# Patient Record
Sex: FEMALE | Race: WHITE | NOT HISPANIC OR LATINO | Employment: OTHER | URBAN - METROPOLITAN AREA
[De-identification: names, ages, dates, MRNs, and addresses within clinical notes are randomized per-mention and may not be internally consistent; named-entity substitution may affect disease eponyms.]

---

## 2017-01-09 DIAGNOSIS — E53.8 VITAMIN B12 DEFICIENCY (NON ANEMIC): Primary | ICD-10-CM

## 2017-01-09 NOTE — TELEPHONE ENCOUNTER
BD Luer-michael syringes     Last Written Prescription Date: 01/18/16  Last Fill Quantity: 12,  # refills: 1   Last Office Visit with [facility], [facility] or [facility] prescribing provider: 06/13/16

## 2017-01-16 DIAGNOSIS — I10 ESSENTIAL HYPERTENSION: Primary | ICD-10-CM

## 2017-01-16 NOTE — TELEPHONE ENCOUNTER
Labetalol    Last Written Prescription Date: 09/27/16  Last Fill Quantity: 180, # refills: 3    Last Office Visit with [facility], P or [facility] prescribing provider:  06/13/16   Future Office Visit:        BP Readings from Last 3 Encounters:   06/13/16 123/78   11/18/15 136/86   12/10/14 123/82

## 2017-01-17 NOTE — TELEPHONE ENCOUNTER
Pt is out of medication and wants filled today   Please advise     Lillie Kam  [facility] [place]

## 2017-01-18 RX ORDER — LABETALOL 100 MG/1
300 TABLET, FILM COATED ORAL 2 TIMES DAILY
Qty: 180 TABLET | Refills: 4 | Status: SHIPPED | OUTPATIENT
Start: 2017-01-18 | End: 2017-06-06

## 2017-01-18 NOTE — TELEPHONE ENCOUNTER
Prescription approved per [facility] Refill Protocol.    Mayra WILLIS RN

## 2017-06-06 DIAGNOSIS — I10 ESSENTIAL HYPERTENSION: ICD-10-CM

## 2017-06-06 NOTE — TELEPHONE ENCOUNTER
Labetalol      Last Written Prescription Date: 01/18/2017  Last Fill Quantity: 180, # refills: 4  Last [facility] with [facility], [facility] or [facility] prescribing provider: 06/13/2016       Potassium   Date Value Ref Range Status   11/18/2015 3.5 3.4 - 5.3 mmol/L Final     Creatinine   Date Value Ref Range Status   11/18/2015 0.82 0.52 - 1.04 mg/dL Final     BP Readings from Last 3 Encounters:   06/13/16 123/78   11/18/15 136/86   12/10/14 123/82       Garrick EDDY (R)

## 2017-06-08 RX ORDER — LABETALOL 100 MG/1
300 TABLET, FILM COATED ORAL 2 TIMES DAILY
Qty: 180 TABLET | Refills: 0 | Status: SHIPPED | OUTPATIENT
Start: 2017-06-08 | End: 2017-06-26

## 2017-06-21 ENCOUNTER — TELEPHONE (OUTPATIENT)
Dept: FAMILY MEDICINE | Facility: CLINIC | Age: 62
End: 2017-06-21

## 2017-06-21 DIAGNOSIS — I10 ESSENTIAL HYPERTENSION WITH GOAL BLOOD PRESSURE LESS THAN 140/90: ICD-10-CM

## 2017-06-21 DIAGNOSIS — E53.8 VITAMIN B12 DEFICIENCY (NON ANEMIC): ICD-10-CM

## 2017-06-21 NOTE — TELEPHONE ENCOUNTER
HCTZ     Last Written Prescription Date: 06/13/16  Last Fill Quantity: 90, # refills: 3  Last Office Visit with [facility], [facility] or  Health prescribing provider: 06/13/16  Next 5 appointments (look out 90 days)     Jun 26, 2017  5:00 PM CDT   Office Visit with YARA Allen CNP   [facility]    [address] [address]   [phone]                   Potassium   Date Value Ref Range Status   11/18/2015 3.5 3.4 - 5.3 mmol/L Final     Creatinine   Date Value Ref Range Status   11/18/2015 0.82 0.52 - 1.04 mg/dL Final     BP Readings from Last 3 Encounters:   06/13/16 123/78   11/18/15 136/86   12/10/14 123/82     Vit B12 injection      Last Written Prescription Date: 06/13/16  Last Fill Quantity: 90,  # refills: 3   Last Office Visit with [facility], [facility] or  Health prescribing provider: 06/13/16                                         Next 5 appointments (look out 90 days)     Jun 26, 2017  5:00 PM CDT   Office Visit with YARA Allen CNP   [facility]    [address] [address]   [phone]

## 2017-06-26 ENCOUNTER — OFFICE VISIT (OUTPATIENT)
Dept: FAMILY MEDICINE | Facility: CLINIC | Age: 62
End: 2017-06-26
Payer: COMMERCIAL

## 2017-06-26 VITALS
HEART RATE: 56 BPM | HEIGHT: 64 IN | SYSTOLIC BLOOD PRESSURE: 130 MMHG | TEMPERATURE: 97.9 F | DIASTOLIC BLOOD PRESSURE: 79 MMHG

## 2017-06-26 DIAGNOSIS — I10 ESSENTIAL HYPERTENSION WITH GOAL BLOOD PRESSURE LESS THAN 140/90: ICD-10-CM

## 2017-06-26 DIAGNOSIS — E53.8 VITAMIN B12 DEFICIENCY (NON ANEMIC): ICD-10-CM

## 2017-06-26 LAB
ANION GAP SERPL CALCULATED.3IONS-SCNC: 6 MMOL/L (ref 3–14)
BUN SERPL-MCNC: 10 MG/DL (ref 7–30)
CALCIUM SERPL-MCNC: 9.8 MG/DL (ref 8.5–10.1)
CHLORIDE SERPL-SCNC: 105 MMOL/L (ref 94–109)
CO2 SERPL-SCNC: 30 MMOL/L (ref 20–32)
CREAT SERPL-MCNC: 0.88 MG/DL (ref 0.52–1.04)
ERYTHROCYTE [DISTWIDTH] IN BLOOD BY AUTOMATED COUNT: 12.9 % (ref 10–15)
GFR SERPL CREATININE-BSD FRML MDRD: 66 ML/MIN/1.7M2
GLUCOSE SERPL-MCNC: 81 MG/DL (ref 70–99)
HCT VFR BLD AUTO: 43.2 % (ref 35–47)
HGB BLD-MCNC: 14.5 G/DL (ref 11.7–15.7)
MCH RBC QN AUTO: 30.5 PG (ref 26.5–33)
MCHC RBC AUTO-ENTMCNC: 33.6 G/DL (ref 31.5–36.5)
MCV RBC AUTO: 91 FL (ref 78–100)
PLATELET # BLD AUTO: 245 10E9/L (ref 150–450)
POTASSIUM SERPL-SCNC: 3.5 MMOL/L (ref 3.4–5.3)
RBC # BLD AUTO: 4.75 10E12/L (ref 3.8–5.2)
SODIUM SERPL-SCNC: 141 MMOL/L (ref 133–144)
WBC # BLD AUTO: 7.5 10E9/L (ref 4–11)

## 2017-06-26 PROCEDURE — 80048 BASIC METABOLIC PNL TOTAL CA: CPT | Performed by: NURSE PRACTITIONER

## 2017-06-26 PROCEDURE — 82607 VITAMIN B-12: CPT | Performed by: NURSE PRACTITIONER

## 2017-06-26 PROCEDURE — 36415 COLL VENOUS BLD VENIPUNCTURE: CPT | Performed by: NURSE PRACTITIONER

## 2017-06-26 PROCEDURE — 85027 COMPLETE CBC AUTOMATED: CPT | Performed by: NURSE PRACTITIONER

## 2017-06-26 PROCEDURE — 99214 OFFICE O/P EST MOD 30 MIN: CPT | Performed by: NURSE PRACTITIONER

## 2017-06-26 RX ORDER — VALSARTAN 160 MG/1
160 TABLET ORAL DAILY
Qty: 90 TABLET | Refills: 3 | Status: SHIPPED | OUTPATIENT
Start: 2017-06-26 | End: 2018-06-09

## 2017-06-26 RX ORDER — CYANOCOBALAMIN 1000 UG/ML
1 INJECTION, SOLUTION INTRAMUSCULAR; SUBCUTANEOUS
Qty: 3 ML | Refills: 9 | Status: SHIPPED | OUTPATIENT
Start: 2017-06-26 | End: 2018-07-17

## 2017-06-26 RX ORDER — HYDROCHLOROTHIAZIDE 12.5 MG/1
CAPSULE ORAL
Qty: 30 CAPSULE | Refills: 2 | OUTPATIENT
Start: 2017-06-26

## 2017-06-26 RX ORDER — LABETALOL 100 MG/1
300 TABLET, FILM COATED ORAL 2 TIMES DAILY
Qty: 540 TABLET | Refills: 3 | Status: SHIPPED | OUTPATIENT
Start: 2017-06-26 | End: 2018-07-05

## 2017-06-26 RX ORDER — HYDROCHLOROTHIAZIDE 12.5 MG/1
12.5 CAPSULE ORAL EVERY MORNING
Qty: 90 CAPSULE | Refills: 3 | Status: SHIPPED | OUTPATIENT
Start: 2017-06-26 | End: 2018-06-20

## 2017-06-26 RX ORDER — CYANOCOBALAMIN 1000 UG/ML
INJECTION, SOLUTION INTRAMUSCULAR; SUBCUTANEOUS
Qty: 1 ML | Refills: 8 | OUTPATIENT
Start: 2017-06-26

## 2017-06-26 NOTE — PROGRESS NOTES
"  SUBJECTIVE:                                                    Aretha Hooks is a 61 year old female who presents to clinic today for the following health issues:      Hypertension Follow-up      Outpatient blood pressures are being checked when she gives blood, every 6 weeks.  Results are good.    Low Salt Diet: no added salt      Amount of exercise or physical activity: little bit    Problems taking medications regularly: No    Medication side effects: none    Diet: regular (no restrictions)      Vitamin B12 deficiency:  Chronic issue.  Does injections once per month at home.      Problem list and histories reviewed & adjusted, as indicated.  Additional history: as documented      Reviewed and updated as needed this visit by clinical staff  Tobacco  Allergies  Med Hx  Surg Hx  Fam Hx  Soc Hx      Reviewed and updated as needed this visit by Provider         ROS:  Constitutional, HEENT, cardiovascular, pulmonary, gi and gu systems are negative, except as otherwise noted.    OBJECTIVE:     /79 (BP Location: Right arm)  Pulse 56  Temp 97.9  F (36.6  C) (Oral)  Ht 5' 4.25\" (1.632 m)  There is no height or weight on file to calculate BMI.  GENERAL: healthy, alert and no distress  HENT: ear canals and TM's normal, nose and mouth without ulcers or lesions  NECK: no adenopathy, no asymmetry, masses, or scars and thyroid normal to palpation  RESP: lungs clear to auscultation - no rales, rhonchi or wheezes  CV: regular rate and rhythm, normal S1 S2, no S3 or S4, no murmur, click or rub, no peripheral edema and peripheral pulses strong        ASSESSMENT/PLAN:       ICD-10-CM    1. Essential hypertension with goal blood pressure less than 140/90 I10 hydrochlorothiazide (MICROZIDE) 12.5 MG capsule     valsartan (DIOVAN) 160 MG tablet     labetalol (NORMODYNE) 100 MG tablet     Basic metabolic panel   2. Vitamin B12 deficiency (non anemic) E53.8 cyanocobalamin (VITAMIN B12) 1000 MCG/ML injection     Syringe, " Disposable, (SYRINGE/CANNULA) 3 ML MISC     CBC with platelets     Vitamin B12         The risks, benefits and treatment options of prescribed medications or other treatments have been discussed with the patient. The patient verbalized their understanding and should call or follow up if no improvement or if they develop further problems.    YARA [facility]

## 2017-06-26 NOTE — LETTER
[facility]  [address] [address]  Phone: [phone]    June 27, 2017    Aretha Jeannine Hooks  [address]  [place] [address]          Dear MsCecille Hooks,    The results of your recent lab tests were:    Blood counts are normal.   Vitamin B12 levels are normal.   Kidney function and electrolytes are normal.       Enclosed is a copy of these results.  If you have any further questions or problems, please contact our office.        Sincerely,      SELAM Rojas / KERI

## 2017-06-26 NOTE — MR AVS SNAPSHOT
After Visit Summary   6/26/2017    Aretha Hooks    MRN: [id number]           Patient Information     Date Of Birth          1955        Visit Information        Provider Department      6/26/2017 5:00 PM Autumn Cedeno APRN CNP [facility]        Today's Diagnoses     Essential hypertension with goal blood pressure less than 140/90        Vitamin B12 deficiency (non anemic)           Follow-ups after your visit        Your next 10 appointments already scheduled     Jul 11, 2017  4:15 PM CDT   MA SCREENING DIGITAL BILATERAL with [id number]   [facility] ([facility])    [address] [address]   [phone]           Do not use any powder, lotion or deodorant under your arms or on your breast. If you do, we will ask you to remove it before your exam.  Wear comfortable, two-piece clothing.  If you have any allergies, tell your care team.  Bring any previous mammograms from other facilities or have them mailed to the breast center.              Who to contact     If you have questions or need follow up information about today's clinic visit or your schedule please contact [facility] directly at [phone].  Normal or non-critical lab and imaging results will be communicated to you by MyChart, letter or phone within 4 business days after the clinic has received the results. If you do not hear from us within 7 days, please contact the clinic through MyChart or phone. If you have a critical or abnormal lab result, we will notify you by phone as soon as possible.  Submit refill requests through [employer] or call your pharmacy and they will forward the refill request to us. Please allow 3 business days for your refill to be completed.          Additional Information About Your Visit        MyChart Information     [employer] gives you secure access to your electronic health record. If you see a primary care provider,  "you can also send messages to your care team and make appointments. If you have questions, please call your primary care clinic.  If you do not have a primary care provider, please call [phone] and they will assist you.        Care EveryWhere ID     This is your Care EveryWhere ID. This could be used by other organizations to access your [place] medical records  [id number]        Your Vitals Were     Pulse Temperature Height             56 97.9  F (36.6  C) (Oral) 5' 4.25\" (1.632 m)          Blood Pressure from Last 3 Encounters:   06/26/17 130/79   06/13/16 123/78   11/18/15 136/86    Weight from Last 3 Encounters:   04/01/13 178 lb (80.7 kg)   03/27/12 178 lb (80.7 kg)   03/06/12 178 lb (80.7 kg)              We Performed the Following     Basic metabolic panel     CBC with platelets     Vitamin B12          Today's Medication Changes          These changes are accurate as of: 6/26/17  5:16 PM.  If you have any questions, ask your nurse or doctor.               These medicines have changed or have updated prescriptions.        Dose/Directions    labetalol 100 MG tablet   Commonly known as:  NORMODYNE   This may have changed:  additional instructions   Used for:  Essential hypertension with goal blood pressure less than 140/90   Changed by:  Autumn Cedeno APRN CNP        Dose:  300 mg   Take 3 tablets (300 mg) by mouth 2 times daily   Quantity:  540 tablet   Refills:  3            Where to get your medicines      These medications were sent to [id number] - [place], [place] - [address]  [address] [address]     Phone:  [phone]     cyanocobalamin 1000 MCG/ML injection    hydrochlorothiazide 12.5 MG capsule    labetalol 100 MG tablet    Syringe/Cannula 3 ML Misc    valsartan 160 MG tablet                Primary Care Provider Office Phone # Fax #    Judy Jiménez [phone]       [facility] [address] [facility] [address]        Equal Access to Services     CAIN LOPEZ : Hadii aad ku hadcoreyjose Adams, waaxda luezra, qaybta darrellmaosman leigh, froilan hung. So [facility] [phone].    ATENCIÓN: Si habla español, tiene a kate disposición servicios gratuitos de asistencia lingüística. Llame al [phone].    We comply with applicable federal civil rights laws and [place] laws. We do not discriminate on the basis of race, color, national origin, age, disability sex, sexual orientation or gender identity.            Thank you!     Thank you for choosing [facility]  for your care. Our goal is always to provide you with excellent care. Hearing back from our patients is one way we can continue to improve our services. Please take a few minutes to complete the written survey that you may receive in the mail after your visit with us. Thank you!             Your Updated Medication List - Protect others around you: Learn how to safely use, store and throw away your medicines at www.disposemymeds.org.          This list is accurate as of: 6/26/17  5:16 PM.  Always use your most recent med list.                   Brand Name Dispense Instructions for use Diagnosis    CALTRATE 600 + D 600-200 MG-IU Tabs      2 TABLET DAILY        cyanocobalamin 1000 MCG/ML injection    VITAMIN B12    3 mL    Inject 1 mL (1,000 mcg) into the muscle every 30 days    Vitamin B12 deficiency (non anemic)       Fish Oil Oil      1 capsule twice daily - unknown dose        hydrochlorothiazide 12.5 MG capsule    MICROZIDE    90 capsule    Take 1 capsule (12.5 mg) by mouth every morning    Essential hypertension with goal blood pressure less than 140/90       labetalol 100 MG tablet    NORMODYNE    540 tablet    Take 3 tablets (300 mg) by mouth 2 times daily    Essential hypertension with goal blood pressure less than 140/90       MULTIVITAMIN TABS   OR      1 TABLET DAILY        NEEDLES, ANY SIZE      1 Box    Use once a month for B12 injection    Vitamin B12 deficiency (non anemic)       Syringe/Cannula 3 ML Misc     12 each    3 ml syringe    Vitamin B12 deficiency (non anemic)       valsartan 160 MG tablet    DIOVAN    90 tablet    Take 1 tablet (160 mg) by mouth daily    Essential hypertension with goal blood pressure less than 140/90

## 2017-06-26 NOTE — TELEPHONE ENCOUNTER
Vit B12 Injection     Last Written Prescription Date: 6/13/16  Last Fill Quantity: 3    # refills: 9  Last Office Visit with [facility] or  Health prescribing provider:  6/13/16  Next 5 appointments (look out 90 days)     Jun 26, 2017  5:00 PM CDT   [facility] Visit with YARA Allen CNP   [facility]    [address] [address]   [phone]                   Lab Results   Component Value Date    WBC 7.6 03/06/2009     Lab Results   Component Value Date    RBC 5.26 03/06/2009     Lab Results   Component Value Date    HGB 13.9 08/16/2011     Lab Results   Component Value Date    HCT 46.5 03/06/2009     No components found for: MCT  Lab Results   Component Value Date    MCV 88 03/06/2009     Lab Results   Component Value Date    MCH 30.0 03/06/2009     Lab Results   Component Value Date    MCHC 34.0 03/06/2009     Lab Results   Component Value Date    RDW 12.6 03/06/2009     Lab Results   Component Value Date     03/06/2009     Lab Results   Component Value Date    AST 20 01/26/2004     Lab Results   Component Value Date    ALT 23 01/26/2004     Creatinine   Date Value Ref Range Status   11/18/2015 0.82 0.52 - 1.04 mg/dL Final       HCTZ    Last Written Prescription Date: 6/13/16  Last Fill Quantity: 90, # refills: 3  Last Office Visit with [facility], [facility] or  Health prescribing provider: 6/13/17  Next 5 appointments (look out 90 days)     Jun 26, 2017  5:00 PM CDT   Office Visit with YARA Allen CNP   [facility]    [address] [place] [address]   [phone]                   Potassium   Date Value Ref Range Status   11/18/2015 3.5 3.4 - 5.3 mmol/L Final     Creatinine   Date Value Ref Range Status   11/18/2015 0.82 0.52 - 1.04 mg/dL Final     BP Readings from Last 3 Encounters:   06/13/16 123/78   11/18/15 136/86   12/10/14 123/82       Routing refill  request to provider for review/approval because:  Labs not current:    Patient needs to be seen because it has been more than 1 year since last office visit.    Taya BLACK Rn

## 2017-06-27 LAB — VIT B12 SERPL-MCNC: 470 PG/ML (ref 193–986)

## 2017-07-08 ENCOUNTER — HEALTH MAINTENANCE LETTER (OUTPATIENT)
Age: 62
End: 2017-07-08

## 2017-07-11 ENCOUNTER — HOSPITAL ENCOUNTER (OUTPATIENT)
Dept: MAMMOGRAPHY | Facility: CLINIC | Age: 62
Discharge: HOME | End: 2017-07-11
Attending: NURSE PRACTITIONER | Admitting: NURSE PRACTITIONER
Payer: COMMERCIAL

## 2017-07-11 DIAGNOSIS — Z12.31 VISIT FOR SCREENING MAMMOGRAM: ICD-10-CM

## 2017-07-11 PROCEDURE — 77063 BREAST TOMOSYNTHESIS BI: CPT

## 2017-10-04 ENCOUNTER — TELEPHONE (OUTPATIENT)
Dept: FAMILY MEDICINE | Facility: CLINIC | Age: 62
End: 2017-10-04

## 2017-10-04 DIAGNOSIS — Z12.11 SPECIAL SCREENING FOR MALIGNANT NEOPLASMS, COLON: Primary | ICD-10-CM

## 2017-10-04 NOTE — TELEPHONE ENCOUNTER
Patient is calling stating for insurance purposes, she needs an order for a Colonoscopy. Please call when that order is in. [phone].  [facility]  [facility] [place]

## 2017-10-04 NOTE — TELEPHONE ENCOUNTER
Judy,    Patient asking for colonoscopy order.  Looks like this abstracted document states one at age 62 which will be end of yr.  FYI this is abstracted as colonoscopy but states normal flex sig.  I have pended the colonoscopy for your approval. Yumi MAURO RN      Order   COLONOSCOPY [EXAMCOL] (Order [id number])   Impression   [facility]  Date of service: 12/4/13  Indication: carcinoid tumor, by history  Comments: Normal flex sig with prominent tattoos and scar in the  mid rectum.   Plan: Cardinoid tumor of rectum, discontinue F/U except for  routine colonoscopy at age 62. Risk is so low it is probably not  worth following at this point.

## 2017-10-05 NOTE — TELEPHONE ENCOUNTER
Pt calling to see if this had been done. She would like us to contact her on her cell phone. That number is [phone].    [facility]

## 2017-10-05 NOTE — TELEPHONE ENCOUNTER
Unable to leave message as mail box is not set up yet.  I have sent her a my chart message about the order for colonoscopy being complete. Yumi MAURO RN  I have mailed prep to her. Yumi MAURO RN

## 2017-11-08 ENCOUNTER — ANESTHESIA EVENT (OUTPATIENT)
Dept: GASTROENTEROLOGY | Facility: CLINIC | Age: 62
End: 2017-11-08
Payer: COMMERCIAL

## 2017-11-08 NOTE — ANESTHESIA PREPROCEDURE EVALUATION
Anesthesia Evaluation     . Pt has had prior anesthetic.            ROS/MED HX    ENT/Pulmonary:     (+)tobacco use, , . .    Neurologic:       Cardiovascular:     (+) hypertension----. : . . . :. .       METS/Exercise Tolerance:     Hematologic:         Musculoskeletal:         GI/Hepatic:     (+) Other GI/Hepatic Carcinoid tumor of colon      Renal/Genitourinary:         Endo:         Psychiatric:         Infectious Disease:         Malignancy:         Other:                     Physical Exam  Normal systems: cardiovascular, pulmonary and dental    Airway   Mallampati: II  TM distance: >3 FB  Neck ROM: full    Dental     Cardiovascular       Pulmonary                     Anesthesia Plan      History & Physical Review  History and physical reviewed and following examination; no interval change.    ASA Status:  2 .    NPO Status:  > 6 hours    Plan for MAC Reason for MAC:  Deep or markedly invasive procedure (G8)         Postoperative Care      Consents  Anesthetic plan, risks, benefits and alternatives discussed with:  Patient..                          .

## 2017-11-10 ENCOUNTER — SURGERY (OUTPATIENT)
Age: 62
End: 2017-11-10

## 2017-11-10 ENCOUNTER — HOSPITAL ENCOUNTER (OUTPATIENT)
Facility: CLINIC | Age: 62
Discharge: HOME | End: 2017-11-10
Attending: SURGERY | Admitting: SURGERY
Payer: COMMERCIAL

## 2017-11-10 ENCOUNTER — ANESTHESIA (OUTPATIENT)
Dept: GASTROENTEROLOGY | Facility: CLINIC | Age: 62
End: 2017-11-10
Payer: COMMERCIAL

## 2017-11-10 VITALS
WEIGHT: 175 LBS | RESPIRATION RATE: 16 BRPM | HEART RATE: 71 BPM | HEIGHT: 66 IN | DIASTOLIC BLOOD PRESSURE: 67 MMHG | OXYGEN SATURATION: 96 % | SYSTOLIC BLOOD PRESSURE: 111 MMHG | BODY MASS INDEX: 28.12 KG/M2 | TEMPERATURE: 98.3 F

## 2017-11-10 LAB — COLONOSCOPY: NORMAL

## 2017-11-10 PROCEDURE — 25000125 ZZHC RX 250: Performed by: SURGERY

## 2017-11-10 PROCEDURE — 45380 COLONOSCOPY AND BIOPSY: CPT | Performed by: SURGERY

## 2017-11-10 PROCEDURE — 88305 TISSUE EXAM BY PATHOLOGIST: CPT | Performed by: SURGERY

## 2017-11-10 PROCEDURE — 88305 TISSUE EXAM BY PATHOLOGIST: CPT | Mod: 26 | Performed by: SURGERY

## 2017-11-10 PROCEDURE — 37000008 ZZH ANESTHESIA TECHNICAL FEE, 1ST 30 MIN: Performed by: SURGERY

## 2017-11-10 PROCEDURE — 25000125 ZZHC RX 250: Performed by: NURSE ANESTHETIST, CERTIFIED REGISTERED

## 2017-11-10 PROCEDURE — 45380 COLONOSCOPY AND BIOPSY: CPT | Mod: PT | Performed by: SURGERY

## 2017-11-10 PROCEDURE — 25000128 H RX IP 250 OP 636: Performed by: SURGERY

## 2017-11-10 PROCEDURE — 25000128 H RX IP 250 OP 636: Performed by: NURSE ANESTHETIST, CERTIFIED REGISTERED

## 2017-11-10 RX ORDER — GLYCOPYRROLATE 0.2 MG/ML
INJECTION, SOLUTION INTRAMUSCULAR; INTRAVENOUS PRN
Status: DISCONTINUED | OUTPATIENT
Start: 2017-11-10 | End: 2017-11-10

## 2017-11-10 RX ORDER — PROPOFOL 10 MG/ML
INJECTION, EMULSION INTRAVENOUS CONTINUOUS PRN
Status: DISCONTINUED | OUTPATIENT
Start: 2017-11-10 | End: 2017-11-10

## 2017-11-10 RX ORDER — ONDANSETRON 2 MG/ML
4 INJECTION INTRAMUSCULAR; INTRAVENOUS
Status: DISCONTINUED | OUTPATIENT
Start: 2017-11-10 | End: 2017-11-10 | Stop reason: HOSPADM

## 2017-11-10 RX ORDER — PROPOFOL 10 MG/ML
INJECTION, EMULSION INTRAVENOUS PRN
Status: DISCONTINUED | OUTPATIENT
Start: 2017-11-10 | End: 2017-11-10

## 2017-11-10 RX ORDER — LIDOCAINE HYDROCHLORIDE 10 MG/ML
INJECTION, SOLUTION INFILTRATION; PERINEURAL PRN
Status: DISCONTINUED | OUTPATIENT
Start: 2017-11-10 | End: 2017-11-10

## 2017-11-10 RX ORDER — LIDOCAINE 40 MG/G
CREAM TOPICAL
Status: DISCONTINUED | OUTPATIENT
Start: 2017-11-10 | End: 2017-11-10 | Stop reason: HOSPADM

## 2017-11-10 RX ORDER — SODIUM CHLORIDE, SODIUM LACTATE, POTASSIUM CHLORIDE, CALCIUM CHLORIDE 600; 310; 30; 20 MG/100ML; MG/100ML; MG/100ML; MG/100ML
INJECTION, SOLUTION INTRAVENOUS CONTINUOUS
Status: DISCONTINUED | OUTPATIENT
Start: 2017-11-10 | End: 2017-11-10 | Stop reason: HOSPADM

## 2017-11-10 RX ADMIN — LIDOCAINE HYDROCHLORIDE 1 ML: 10 INJECTION, SOLUTION EPIDURAL; INFILTRATION; INTRACAUDAL; PERINEURAL at 08:50

## 2017-11-10 RX ADMIN — PROPOFOL 200 MCG/KG/MIN: 10 INJECTION, EMULSION INTRAVENOUS at 09:18

## 2017-11-10 RX ADMIN — GLYCOPYRROLATE 0.2 MG: 0.2 INJECTION, SOLUTION INTRAMUSCULAR; INTRAVENOUS at 09:17

## 2017-11-10 RX ADMIN — SODIUM CHLORIDE, POTASSIUM CHLORIDE, SODIUM LACTATE AND CALCIUM CHLORIDE: 600; 310; 30; 20 INJECTION, SOLUTION INTRAVENOUS at 08:50

## 2017-11-10 RX ADMIN — PROPOFOL 100 MG: 10 INJECTION, EMULSION INTRAVENOUS at 09:18

## 2017-11-10 RX ADMIN — LIDOCAINE HYDROCHLORIDE 50 MG: 10 INJECTION, SOLUTION INFILTRATION; PERINEURAL at 09:17

## 2017-11-10 ASSESSMENT — LIFESTYLE VARIABLES: TOBACCO_USE: 1

## 2017-11-10 NOTE — H&P
[facility]  GI Pre-Procedure History & Physical      Name: Aretha Hooks MRN#: [id number]   Age: 61 year old YOB: 1955     Date of Procedure: 11/10/2017    Primary care provider: Judy Jiménez      Reason for Procedure:   Screening (V76.51), previous history of a carcinoid tumor removed from the colon    Problem List:     Patient Active Problem List   Diagnosis     Essential hypertension     Other specified disorder of rectum and anus     Tear meniscus knee     Hypertriglyceridemia     Vitamin B12 deficiency (non anemic)     CARDIOVASCULAR SCREENING; LDL GOAL LESS THAN 130       Current Outpatient Medications:      No current outpatient prescriptions on file.        Allergies:    No Known Allergies     History:   Medical:  Past Medical History:   Diagnosis Date     ASCUS of cervix with negative high risk HPV 3/2011    per [facility] repeat Pap + HPV cotesting in 3 years     Carcinoid tumor  of colon      Other vitamin B12 deficiency anemia     injection of B12 monthly     Unspecified essential hypertension      Surgical:  Past Surgical History:   Procedure Laterality Date     C APPENDECTOMY  Age 12    Appendectomy     C LIGATE FALLOPIAN TUBE  1984    Tubal Ligation     HC KNEE SCOPE,MED+LAT MENIS REPAIR      left knee     SURGICAL HISTORY OF -       Cervical dilatation with curettage and hysteroscopy with rollerball endometrial ablation     SURGICAL HISTORY OF -   2004    Laparoscopic Cholecystectomy     SURGICAL HISTORY OF -       Cryocautery to cervix for bleeding     SURGICAL HISTORY OF -       endometrial ablation     SURGICAL HISTORY OF -   2008    carcinoid tumor rectum  Ruthie Lawson     Family:  Family History   Problem Relation Age of Onset     CANCER Mother      renal,      Hypertension Father      Respiratory Father      uses cpap     Eye Disorder Father      cataracts     Hypertension Brother      Musculoskeletal Disorder Sister   "    back surg.     Genitourinary Problems Sister      CANCER Sister      skin cancer     Gynecology Sister      hysterectomy     C.A.D. Paternal Grandmother      Breast Cancer No family hx of      Cancer - colorectal No family hx of      Colon Cancer No family hx of      Social:  Social History   Substance Use Topics     Smoking status: Passive Smoke Exposure - Never Smoker     Smokeless tobacco: Never Used      Comment: exposed to 's cigarette smoke     Alcohol use Yes      Comment: occ       Physical Exam:   Vital Signs:  /78  Pulse 71  Temp 98.3  F (36.8  C) (Oral)  Resp 18  Ht 1.676 m (5' 6\")  Wt 79.4 kg (175 lb)  LMP 04/01/2008  SpO2 95%  Breastfeeding? No  BMI 28.25 kg/m2  Airway assessment:  Patient is able to open mouth wide  Patient is able to stick out tongue       Lungs:  No increased work of breathing, good air exchange, clear to auscultation bilaterally, no crackles or wheezing.   Cardiovascular:  Normal- regular rate and rhythm, normal S1 - S2, no S3 or S4, and no murmur noted.  Gastrointestinal:  Abdomen soft, non-tender.  BS normal. No masses, organomegaly.    Assessment:   Appropriately NPO.  No significant changes since H&P.    Plan:   Moderate (conscious) sedation     General and specific risks of the procedure of the procedure including pain, bleeding, and perforation were discussed. Possibility of missing lesions discussed. Prep and recovery were discussed. She asked appropriate questions and provided consent.      Patient's active problems diagnostically and therapeutically optimized for the planned procedure. Risks, benefits, alternatives to sedation and blood explained and consent obtained.  Risks, benefits, alternatives to procedure explained and consent obtained.    I have reviewed the history and physical, lab finding(s), diagnostic data, medications, and the plan for sedation.  I have determined this patient to be an appropriate candidate for the planned " sedation/procedure and have reassessed the patient immediately prior to sedation/procedure.    Emil Vaz MD

## 2017-11-10 NOTE — ANESTHESIA CARE TRANSFER NOTE
Patient: Aretha Hooks    Procedure(s):  colonoscopy - Wound Class: II-Clean Contaminated    Diagnosis: screening  Diagnosis Additional Information: No value filed.    Anesthesia Type:   MAC     Note:  Airway :Room Air  Patient transferred to:Phase II  Handoff Report: Identifed the Patient, Identified the Reponsible Provider, Reviewed the pertinent medical history, Discussed the surgical course, Reviewed Intra-OP anesthesia mangement and issues during anesthesia, Set expectations for post-procedure period and Allowed opportunity for questions and acknowledgement of understanding      Vitals: (Last set prior to Anesthesia Care Transfer)    CRNA VITALS  11/10/2017 0904 - 11/10/2017 0934      11/10/2017             Pulse: 79    SpO2: 98 %                Electronically Signed By: YARA Owen CRNA  November 10, 2017  9:34 AM

## 2017-11-10 NOTE — ANESTHESIA POSTPROCEDURE EVALUATION
Patient: Aretha Hooks    Procedure(s):  colonoscopy - Wound Class: II-Clean Contaminated    Diagnosis:screening  Diagnosis Additional Information: No value filed.    Anesthesia Type:  MAC    Note:  Anesthesia Post Evaluation    Patient location during evaluation: Bedside  Patient participation: Able to fully participate in evaluation  Level of consciousness: awake and alert  Pain management: adequate  Airway patency: patent  Cardiovascular status: acceptable  Respiratory status: acceptable  Hydration status: acceptable  PONV: none     Anesthetic complications: None          Last vitals:  Vitals:    11/10/17 0843   BP: 119/78   Pulse: 71   Resp: 18   Temp: 36.8  C (98.3  F)   SpO2: 95%         Electronically Signed By: YARA Owen CRNA  November 10, 2017  9:35 AM

## 2017-11-15 LAB — COPATH REPORT: NORMAL

## 2017-11-16 PROBLEM — D12.6 TUBULAR ADENOMA OF COLON: Status: ACTIVE | Noted: 2017-11-16

## 2018-01-26 ENCOUNTER — TELEPHONE (OUTPATIENT)
Dept: FAMILY MEDICINE | Facility: CLINIC | Age: 63
End: 2018-01-26

## 2018-01-26 NOTE — TELEPHONE ENCOUNTER
Reason for Call:  Other call back    Detailed comments: Pt started having a tickle in throat Saturday. Now she has sinus headache ( feels like a balloon), throat pain, lungs, nose running like a faucet. She takes 3 different blood pressure meds and isn't sure what she can take or what she can do to relieve symptoms. Please advise.    Phone Number Patient can be reached at: Home number on file [phone] (home)    Best Time: any      Call taken on 1/26/2018 at 10:38 AM by Courtney Weber

## 2018-01-26 NOTE — TELEPHONE ENCOUNTER
Influenza-Like Illness ([facility]ANELIS) Protocol    Aretha Hooks      Age: 62 year old     YOB: 1955    Are you currently sick or have you had close contact with someone who is currently sick?    Yes, this patient is currently sick.     Adult Clinical Evaluation    Is this patient experiencing ANY of the following?  Unconsciousness or unresponsiveness No   Difficulty breathing or swallowing No   Blue or dusky lips, skin, or nail beds No   Chest pain No   Severe confusion or delirium No   Seizure activity: ongoing or stopped No   Severe dehydration or signs of shock No   Patient sounds very sick on the phone No     Is this patient experiencing ANY of the following?  Fever > 104 or shaking chills No   Wheezing with minimal response to usual wheezing medications or new wheezing No   Repeated vomiting or diarrhea with signs of dehydration (no urination within last 12 hours) No   Flu-like symptoms that initially improved but returned with fever and a worse cough No   Stiff or painful neck No   Severe headache No     Does the patient have any of the following?  Measured fever > 100 degrees No   Chills or feels very warm to the touch No   Cough Yes   Sore throat Yes   Muscle/ body aches Yes   Headaches Yes   Fatigue (tiredness) Yes     Nursing Plan      Below are conditions which place adults at increased risk for the more severe complications of influenza.    Does this patient have ANY of the following conditions?  Chronic pulmonary disease such as asthma or COPD No   Heart disease (CHF, CAD, anticoag due to arrhythmia) No   Liver disease (hepatitis, liver failure, cirrhosis) No   Kidney disease (renal failure, insufficiency or dialysis) No   Metabolic disorder (e.g. diabetes) No   Neuromuscular disorder (RICH MENDOZA MD, myasthenia gravis) No   Compromised ability to handle respiratory secretions No   Hematologic disorder (e.g. sickle cell disease) No   HIV / AIDS No   Chemotherapy or radiation within the last 3  months No   Received an organ or a bone marrow transplant No   Taking Prednisone in excess of 20mg daily No   Is age 65 years or older No   Is pregnant, thinks she may be pregnant or is within two weeks after delivery No   Is a resident of a chronic care facility No   Is patient  or [place] native  unknown     Patient does not fall into high risk category.  Offered Home Care Education.  If no improvement in 3-5 days, patient should be seen by a provider.    If further questions/concerns or if new symptoms develop, call your PCP or [facility] as soon as possible.      Provided home care instructions    General home care instruction:      Avoid contact with people in your household who are at increased risk for more severe complications of influenza (such as pregnant women or people who have a chronic health condition, for example diabetes, heart disease, asthma, or emphysema).    Stay home from work, school, childcare or other public places until your fever (37.8 degrees Celsius [100 degrees Fahrenheit]) has been gone for at least 24 hours, except to seek medical care. (Fever should be gone without the use of fever-reducing medications.) Use a surgical mask if available, or cover your mouth and nose with a tissue if possible if you need to seek medical care. Contact your work place, school, or  as they may have longer exclusion times.    You may continue to shed virus after your fever is gone. Limit your contact with high-risk individuals for 10 days after your symptoms started and be especially careful to cover your coughs/sneezes and wash your hands.    Cover your cough and wash your hands often, and especially after coughing, sneezing, blowing your nose.    Drink plenty of fluids (such as water, broth, sports drinks, electrolyte beverages for children) to prevent dehydration.    Avoid tobacco and second hand smoke.    Get plenty of rest.    Use over-the-counter pain relievers as  needed per  instructions.    Do not give aspirin (acetylsalicylic acid) or products that contain aspirin (e.g. bismuth subsalicylate - Pepto Bismol) to children or teenagers 18 years or younger.    A small number of people with influenza do not have fever. If you have respiratory symptoms and are at increased risk for complications of influenza, contact your health care provider to discuss these symptoms.    For parents of infants:    If possible, only family members who are not sick should care for infants.    Wash your hands with soap and water, or an alcohol-based hand rub (if your hands are not visibly soiled) before caring for your infant.    Cover your mouth and nose with a tissue when coughing or sneezing, and clean your hands.    Contact a health care provider to discuss your illness within 1-2 days if you are    Pregnant    Immunocompromised    Call 911 if you experience:    Difficulty breathing or shortness of breath    Pain or pressure in the chest    Confusion or less responsive than normal    Seizure activity: ongoing or stopped    Severe dehydration or signs of shock     Blue or dusky lips, skin, or nail beds    If further questions/concerns or if new symptoms develop, call your PCP or [facility] as soon as possible.    When to seek medical attention    Contact your health care provider right away if you experience:    A painful sore throat accompanied by fever persists for more than 48 hours    Ear pain, sinus pain, persistent vomiting and/or diarrhea    Oral temperature greater than 104  Fahrenheit (40  Celsius)    Dehydration (e.g., mouth feeling dry, dizzy when sitting/standing, decreased urine output)    Severe or persistent vomiting; unable to keep fluids down    Improvement in flu-like symptoms (fever and cough or sore throat) but then return of fever and worse cough or sore throat    Not drinking enough fluid    Any other concerns not stated above      Additional  educational resources include:    http://www.[employer].com    http://www.cdc.gov/flu/  Vi Cadet

## 2018-01-31 ENCOUNTER — HOSPITAL ENCOUNTER (EMERGENCY)
Facility: CLINIC | Age: 63
Discharge: HOME | End: 2018-01-31
Attending: PHYSICIAN ASSISTANT | Admitting: PHYSICIAN ASSISTANT
Payer: COMMERCIAL

## 2018-01-31 VITALS
DIASTOLIC BLOOD PRESSURE: 65 MMHG | RESPIRATION RATE: 14 BRPM | SYSTOLIC BLOOD PRESSURE: 170 MMHG | TEMPERATURE: 97.9 F | OXYGEN SATURATION: 97 %

## 2018-01-31 DIAGNOSIS — H66.001 ACUTE SUPPURATIVE OTITIS MEDIA OF RIGHT EAR WITHOUT SPONTANEOUS RUPTURE OF TYMPANIC MEMBRANE, RECURRENCE NOT SPECIFIED: ICD-10-CM

## 2018-01-31 PROCEDURE — 99213 OFFICE O/P EST LOW 20 MIN: CPT | Performed by: PHYSICIAN ASSISTANT

## 2018-01-31 PROCEDURE — G0463 HOSPITAL OUTPT CLINIC VISIT: HCPCS

## 2018-01-31 RX ORDER — AMOXICILLIN 500 MG/1
1000 CAPSULE ORAL 2 TIMES DAILY
Qty: 40 CAPSULE | Refills: 0 | Status: SHIPPED | OUTPATIENT
Start: 2018-01-31 | End: 2018-02-10

## 2018-01-31 NOTE — ED AVS SNAPSHOT
[facility] Emergency Department    [address] [address]    Phone:  [phone]    Fax:  [phone]                                       Aretha Hooks   MRN: [id number]    Department:  [facility] Emergency Department   Date of Visit:  1/31/2018           After Visit Summary Signature Page     I have received my discharge instructions, and my questions have been answered. I have discussed any challenges I see with this plan with the nurse or doctor.    ..........................................................................................................................................  Patient/Patient Representative Signature      ..........................................................................................................................................  Patient Representative Print Name and Relationship to Patient    ..................................................               ................................................  Date                                            Time    ..........................................................................................................................................  Reviewed by Signature/Title    ...................................................              ..............................................  Date                                                            Time

## 2018-01-31 NOTE — TELEPHONE ENCOUNTER
Patient is calling us back, stating she is not any better with the suggestions of the OTC medications. Now going to her right ear. Should she be seen, or get medication?  Work number is [phone].  [facility]  [facility]

## 2018-01-31 NOTE — DISCHARGE INSTRUCTIONS
Middle Ear Infection (Adult)  You have an infection of the middle ear, the space behind the eardrum. This is also called acute otitis media (AOM). Sometimes it is caused by the common cold. This is because congestion can block the internal passage (eustachian tube) that drains fluid from the middle ear. When the middle ear fills with fluid, bacteria can grow there and cause an infection. Oral antibiotics are used to treat this illness, not ear drops. Symptoms usually start to improve within 1 to 2 days of treatment.    Home care  The following are general care guidelines:    Finish all of the antibiotic medicine given, even though you may feel better after the first few days.    You may use over-the-counter medicine, such as acetaminophen or ibuprofen, to control pain and fever, unless something else was prescribed. If you have chronic liver or kidney disease or have ever had a stomach ulcer or gastrointestinal bleeding, talk with your healthcare provider before using these medicines. Do not give aspirin to anyone under 18 years of age who has a fever. It may cause severe illness or death.  Follow-up care  Follow up with your healthcare provider, or as advised, in 2 weeks if all symptoms have not gotten better, or if hearing doesn't go back to normal within 1 month.  When to seek medical advice  Call your healthcare provider right away if any of these occur:    Ear pain gets worse or does not improve after 3 days of treatment    Unusual drowsiness or confusion    Neck pain, stiff neck, or headache    Fluid or blood draining from the ear canal    Fever of 100.4 F (38 C) or as advised     Seizure  Date Last Reviewed: 6/1/2016 2000-2017 [employer]. [address] [place] [address], [place] [address]. All rights reserved. This information is not intended as a substitute for professional medical care. Always follow your healthcare professional's instructions.

## 2018-01-31 NOTE — ED AVS SNAPSHOT
[facility] Emergency Department    [address] [address]    Phone:  [phone]    Fax:  [phone]                                       Aretha Hooks   MRN: [id number]    Department:  [facility] Emergency Department   Date of Visit:  1/31/2018           Patient Information     Date Of Birth          1955        Your diagnoses for this visit were:     Acute suppurative otitis media of right ear without spontaneous rupture of tympanic membrane, recurrence not specified        You were seen by Camila Stark PA-C.      Follow-up Information     Follow up with Judy Jiménez NP In 1 week.    Specialty:  Nurse Practitioner - Family    Why:  As needed, If symptoms worsen    Contact information:    [address] [address]  [phone]          Discharge Instructions         Middle Ear Infection (Adult)  You have an infection of the middle ear, the space behind the eardrum. This is also called acute otitis media (AOM). Sometimes it is caused by the common cold. This is because congestion can block the internal passage (eustachian tube) that drains fluid from the middle ear. When the middle ear fills with fluid, bacteria can grow there and cause an infection. Oral antibiotics are used to treat this illness, not ear drops. Symptoms usually start to improve within 1 to 2 days of treatment.    Home care  The following are general care guidelines:    Finish all of the antibiotic medicine given, even though you may feel better after the first few days.    You may use over-the-counter medicine, such as acetaminophen or ibuprofen, to control pain and fever, unless something else was prescribed. If you have chronic liver or kidney disease or have ever had a stomach ulcer or gastrointestinal bleeding, talk with your healthcare provider before using these medicines. Do not give aspirin to anyone under 18 years of age who has a fever. It may cause severe illness or  death.  Follow-up care  Follow up with your healthcare provider, or as advised, in 2 weeks if all symptoms have not gotten better, or if hearing doesn't go back to normal within 1 month.  When to seek medical advice  Call your healthcare provider right away if any of these occur:    Ear pain gets worse or does not improve after 3 days of treatment    Unusual drowsiness or confusion    Neck pain, stiff neck, or headache    Fluid or blood draining from the ear canal    Fever of 100.4 F (38 C) or as advised     Seizure  Date Last Reviewed: 6/1/2016 2000-2017 [employer]. [address] [place] [address]. All rights reserved. This information is not intended as a substitute for professional medical care. Always follow your healthcare professional's instructions.          Future Appointments        Provider Department Dept Phone Center    2/1/2018 6:40 AM Sybil Orozco MD [facility] [phone] [facility]      24 Hour Appointment Hotline       To make an appointment at any [facility], call [phone] ([phone]). If you don't have a family doctor or clinic, we will help you find one. [facility] are conveniently located to serve the needs of you and your family.             Review of your medicines      START taking        Dose / Directions Last dose taken    amoxicillin 500 MG capsule   Commonly known as:  AMOXIL   Dose:  1000 mg   Quantity:  40 capsule        Take 2 capsules (1,000 mg) by mouth 2 times daily for 10 days   Refills:  0          Our records show that you are taking the medicines listed below. If these are incorrect, please call your family doctor or clinic.        Dose / Directions Last dose taken    CALTRATE 600 + D 600-200 MG-IU Tabs        2 TABLET DAILY   Refills:  0        cyanocobalamin 1000 MCG/ML injection   Commonly known as:  VITAMIN B12   Dose:  1 mL   Quantity:  3 mL        Inject 1 mL (1,000 mcg) into the muscle every 30 days    Refills:  9        Fish Oil Oil        1 capsule twice daily - unknown dose   Refills:  0        hydrochlorothiazide 12.5 MG capsule   Commonly known as:  MICROZIDE   Dose:  12.5 mg   Quantity:  90 capsule        Take 1 capsule (12.5 mg) by mouth every morning   Refills:  3        labetalol 100 MG tablet   Commonly known as:  NORMODYNE   Dose:  300 mg   Quantity:  540 tablet        Take 3 tablets (300 mg) by mouth 2 times daily   Refills:  3        MULTIVITAMIN TABS   OR        1 TABLET DAILY   Refills:  0        NEEDLES, ANY SIZE   Quantity:  1 Box        Use once a month for B12 injection   Refills:  1        Syringe/Cannula 3 ML Misc   Quantity:  12 each        3 ml syringe   Refills:  1        valsartan 160 MG tablet   Commonly known as:  DIOVAN   Dose:  160 mg   Quantity:  90 tablet        Take 1 tablet (160 mg) by mouth daily   Refills:  3                Prescriptions were sent or printed at these locations (1 Prescription)                   [id number] - [place], [place] - [address]   [address] [place] [address]    Telephone:  [phone]   Fax:  [phone]   Hours:                  E-Prescribed (1 of 1)         amoxicillin (AMOXIL) 500 MG capsule                Orders Needing Specimen Collection     None      Pending Results     No orders found from 1/29/2018 to 2/1/2018.            Pending Culture Results     No orders found from 1/29/2018 to 2/1/2018.            Pending Results Instructions     If you had any lab results that were not finalized at the time of your Discharge, you can call the ED Lab Result RN at [phone]. You will be contacted by this team for any positive Lab results or changes in treatment. The nurses are available 7 days a week from 10A to 6:30P.  You can leave a message 24 hours per day and they will return your call.        Test Results From Your Hospital Stay               Thank you for choosing [facility]       Thank you for choosing  [facility] for your care. Our goal is always to provide you with excellent care. Hearing back from our patients is one way we can continue to improve our services. Please take a few minutes to complete the written survey that you may receive in the mail after you visit with us. Thank you!        [employer] Information     [employer] gives you secure access to your electronic health record. If you see a primary care provider, you can also send messages to your care team and make appointments. If you have questions, please call your primary care clinic.  If you do not have a primary care provider, please call [phone] and they will assist you.        Care EveryWhere ID     This is your Care EveryWhere ID. This could be used by other organizations to access your [facility] medical records  [id number]        Equal Access to Services     CAIN LOPEZ : Bekah Adams, lluvia mondragon, jacob leigh, froilan hung. So [facility] [phone].    ATENCIÓN: Si habla español, tiene a kate disposición servicios gratuitos de asistencia lingüística. Llame al [phone].    We comply with applicable federal civil rights laws and [place] laws. We do not discriminate on the basis of race, color, national origin, age, disability, sex, sexual orientation, or gender identity.            After Visit Summary       This is your record. Keep this with you and show to your community pharmacist(s) and doctor(s) at your next visit.

## 2018-01-31 NOTE — ED PROVIDER NOTES
History     Chief Complaint   Patient presents with     Sinusitis     ear discomfort     HPI  Aretha Hooks is a 62 year old female who to the urgent care with concern over possible sinus infection.  10 days prior to arrival patient developed sore throat, chills, nasal congestion, cough.  Since then she has progressively worsening sinus pain especially when leaning forward.  She also developed right ear pain today.  She has not had any objective fever, dyspnea, wheezing or abdominal complaints.  She has attempted to treat with Robitussin, Mucinex without significant improvement.  She denies any history of asthma, COPD or other breathing related disorders.  Infrequent sinus infections previously.  She is a non-smoker.    Problem List:    Patient Active Problem List    Diagnosis Date Noted     Tubular adenoma of colon 11/16/2017     Priority: Medium     Collected: 11/10/2017   Received: 11/10/2017   Reported: 11/15/2017 23:36   Ordering Phy(s): SARAH BETH RODRIGUEZ     For improved result formatting, select 'View Enhanced Report Format'   under Linked Documents section.     SPECIMEN(S):   Colon polyp, right     FINAL DIAGNOSIS:   Colon polyp, right:   - Tubular adenoma.   - Negative for high grade dysplasia or malignancy.        Vitamin B12 deficiency (non anemic) 11/18/2015     Priority: Medium     CARDIOVASCULAR SCREENING; LDL GOAL LESS THAN 130 11/18/2015     Priority: Medium     Hypertriglyceridemia 05/31/2012     Priority: Medium     Tear meniscus knee 03/02/2009     Priority: Medium     Other specified disorder of rectum and anus 12/07/2007     Priority: Medium      11/07  - A 11 mm polyp at 6 cm. Resected and retrieved.                      - The examination was otherwise normal pathology  Carcinoid tumor:  follow up with Dr White Gastrienterologist       Essential hypertension 11/03/2005     Priority: Medium     Medications:  Diovan 160 mg, labetalol    , chlorthalidone 25 mg bid  BP Readings from Last  3 Encounters:   11/18/15 136/86   12/10/14 123/82   14 122/89                 Past Medical History:    Past Medical History:   Diagnosis Date     ASCUS of cervix with negative high risk HPV 3/2011     Carcinoid tumor  of colon      Other vitamin B12 deficiency anemia      Unspecified essential hypertension        Past Surgical History:    Past Surgical History:   Procedure Laterality Date     C APPENDECTOMY  Age 12    Appendectomy     C LIGATE FALLOPIAN TUBE  1984    Tubal Ligation     COLONOSCOPY N/A 11/10/2017    Procedure: COMBINED COLONOSCOPY, SINGLE OR MULTIPLE BIOPSY/POLYPECTOMY BY BIOPSY;  colonoscopy;  Surgeon: Emil Vaz MD;  Location: [facility]      KNEE SCOPE,MED+LAT MENIS REPAIR      left knee     SURGICAL HISTORY OF -       Cervical dilatation with curettage and hysteroscopy with rollerball endometrial ablation     SURGICAL HISTORY OF -   2004    Laparoscopic Cholecystectomy     SURGICAL HISTORY OF -       Cryocautery to cervix for bleeding     SURGICAL HISTORY OF -       endometrial ablation     SURGICAL HISTORY OF -   2008    carcinoid tumor rectum  [facility] [address] Yeison Lawson       Family History:    Family History   Problem Relation Age of Onset     CANCER Mother      renal,      Hypertension Father      Respiratory Father      uses cpap     Eye Disorder Father      cataracts     Hypertension Brother      Musculoskeletal Disorder Sister      back surg.     Genitourinary Problems Sister      CANCER Sister      skin cancer     Gynecology Sister      hysterectomy     C.A.D. Paternal Grandmother      Breast Cancer No family hx of      Cancer - colorectal No family hx of      Colon Cancer No family hx of        Social History:  Marital Status:   [2]  Social History   Substance Use Topics     Smoking status: Passive Smoke Exposure - Never Smoker     Smokeless tobacco: Never Used      Comment: exposed to 's cigarette smoke     Alcohol use Yes       Comment: occ        Medications:      amoxicillin (AMOXIL) 500 MG capsule   hydrochlorothiazide (MICROZIDE) 12.5 MG capsule   valsartan (DIOVAN) 160 MG tablet   labetalol (NORMODYNE) 100 MG tablet   cyanocobalamin (VITAMIN B12) 1000 MCG/ML injection   Syringe, Disposable, (SYRINGE/CANNULA) 3 ML MISC   NEEDLES, ANY SIZE   Fish Oil OIL   MULTIVITAMIN TABS   OR   CALTRATE 600 + D 600-200 MG-IU OR TABS     Review of Systems  CONSTITUTIONAL:POSITIVE  for resolved chills, myalgias and NEGATIVE  for fever  INTEGUMENTARY/SKIN: NEGATIVE for worrisome rashes, moles or lesions  EYES: NEGATIVE for vision changes or irritation  ENT/MOUTH: POSITIVE for right ear pain, nasal congestion, sore throat,   RESP:POSITIVE for cough and NEGATIVE for SOB/dyspnea and wheezing  GI: NEGATIVE for abdominal pain, diarrhea, nausea and vomiting  Physical Exam   BP: 170/65  Heart Rate: 63  Temp: 97.9  F (36.6  C)  Resp: 14  SpO2: 97 %    Physical Exam    GENERAL APPEARANCE: healthy, alert and no distress  EYES: EOMI,  PERRL, conjunctiva clear  HENT: ear canals clear. Right TM is erythematous with diminished light reflex.  Nasal mucosa moist.  Tenderness palpation of bilateral maxillary, frontal sinus.  Posterior pharynx nonerythematous without exudate  NECK: supple, nontender, no lymphadenopathy  RESP: lungs clear to auscultation - no rales, rhonchi or wheezes  CV: regular rates and rhythm, normal S1 S2, no murmur noted  SKIN: no suspicious lesions or rashes  ED Course     ED Course     Procedures          Critical Care time:  none            Labs Ordered and Resulted from Time of ED Arrival Up to the Time of Departure from the ED - No data to display    Assessments & Plan (with Medical Decision Making)     I have reviewed the nursing notes.    I have reviewed the findings, diagnosis, plan and need for follow up with the patient.       Discharge Medication List as of 1/31/2018  4:41 PM      START taking these medications    Details   amoxicillin  (AMOXIL) 500 MG capsule Take 2 capsules (1,000 mg) by mouth 2 times daily for 10 days, Disp-40 capsule, R-0, E-Prescribe           Final diagnoses:   Acute suppurative otitis media of right ear without spontaneous rupture of tympanic membrane, recurrence not specified     Continue OTC symptomatic treatment as needed  Follow up with PCP if no improvement in 3-5 days or sooner if new or worsening symptoms    Disclaimer: This note consists of symbols derived from keyboarding, dictation, and/or voice recognition software. As a result, there may be errors in the script that have gone undetected.  Please consider this when interpreting information found in the chart.        1/31/2018   [facility] EMERGENCY DEPARTMENT     Camila Stark PA-C  02/02/18 [id number]

## 2018-03-28 ENCOUNTER — TELEPHONE (OUTPATIENT)
Dept: FAMILY MEDICINE | Facility: CLINIC | Age: 63
End: 2018-03-28

## 2018-03-28 ENCOUNTER — TRANSFERRED RECORDS (OUTPATIENT)
Dept: HEALTH INFORMATION MANAGEMENT | Facility: CLINIC | Age: 63
End: 2018-03-28
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
## 2018-04-06 ENCOUNTER — TELEPHONE (OUTPATIENT)
Dept: FAMILY MEDICINE | Facility: CLINIC | Age: 63
End: 2018-04-06

## 2018-04-06 NOTE — TELEPHONE ENCOUNTER
Reason for Call:  Other appointment    Detailed comments: Patient needs an appointment to follow up an ER visit that found a brain aneurism. She would like a Tuesday appointment but all that is available is same day.  Can I use a same day hold?    Phone Number Patient can be reached at: Work number on file:  [phone] (work)    Best Time: any    Can we leave a detailed message on this number? YES    Call taken on 4/6/2018 at 7:47 AM by Zoya Catherine

## 2018-04-06 NOTE — TELEPHONE ENCOUNTER
Judy,    Spoke to the patient.  She is at work.  She does have a appt with you for 4-10-18 @ 3 p.m. But feels this is too far out. This looks like someone took one of your same day holds.  This is the situation,  Went to [facility], [place] for right side tingling and numbness on 3/28/18.  Patient states she has all the paperwork from the visit for you to review.  The MRI the performed showed a aneurysm approx. 2.? Behind the right eye and below the brain.  On 4-4-18 the patient saw Marlon Milian Neurosurgeon.  The aneurysm is to small to remove right now.  They could do a coiling if she wanted or just monitor it.  The problem is she is still having the Rt. Side numbness and has had two episodes of syncope.  The neurosurgeon felt that the numbness, syncope was a separate problem and per patient dismissed it.  I did ask if she was following up with a neurologist and the patient tells me she was never advised to do that.   Yumi MAURO RN

## 2018-04-06 NOTE — TELEPHONE ENCOUNTER
I saw she was seen for sinusitis - not sure where the aneurysm diagnosis is from.    Can I get more details?  Is she also see neurosurgery or neurology?    SELAM Stein

## 2018-04-10 ENCOUNTER — OFFICE VISIT (OUTPATIENT)
Dept: FAMILY MEDICINE | Facility: CLINIC | Age: 63
End: 2018-04-10
Payer: COMMERCIAL

## 2018-04-10 VITALS
OXYGEN SATURATION: 94 % | HEIGHT: 66 IN | TEMPERATURE: 98.6 F | SYSTOLIC BLOOD PRESSURE: 129 MMHG | HEART RATE: 60 BPM | DIASTOLIC BLOOD PRESSURE: 85 MMHG

## 2018-04-10 DIAGNOSIS — I10 ESSENTIAL HYPERTENSION: ICD-10-CM

## 2018-04-10 DIAGNOSIS — R20.9 SENSORY DISORDER: Primary | ICD-10-CM

## 2018-04-10 DIAGNOSIS — I67.1 BRAIN ANEURYSM: ICD-10-CM

## 2018-04-10 DIAGNOSIS — H69.91 DYSFUNCTION OF RIGHT EUSTACHIAN TUBE: ICD-10-CM

## 2018-04-10 PROCEDURE — 99215 OFFICE O/P EST HI 40 MIN: CPT | Performed by: NURSE PRACTITIONER

## 2018-04-10 RX ORDER — LORATADINE 10 MG/1
10 TABLET ORAL DAILY
Qty: 30 TABLET | Refills: 1 | Status: SHIPPED | OUTPATIENT
Start: 2018-04-10 | End: 2021-04-20

## 2018-04-10 RX ORDER — PREDNISONE 20 MG/1
20 TABLET ORAL DAILY
Qty: 5 TABLET | Refills: 0 | Status: SHIPPED | OUTPATIENT
Start: 2018-04-10 | End: 2018-07-17

## 2018-04-10 NOTE — NURSING NOTE
"Initial /85  Pulse 60  Temp 98.6  F (37  C) (Tympanic)  Ht 5' 6\" (1.676 m)  LMP 04/01/2008  SpO2 94% Estimated body mass index is 28.25 kg/(m^2) as calculated from the following:    Height as of 11/10/17: 5' 6\" (1.676 m).    Weight as of 11/10/17: 175 lb (79.4 kg). .    Carlyn Benitez CMA ([facility])  "

## 2018-04-10 NOTE — PROGRESS NOTES
"  SUBJECTIVE:   Aretha Hooks is a 62 year old female who presents to clinic today for the following health issues:    ED/UC Followup:    Facility:  [facility]   Date of visit: 03/28/18  Reason for visit: She went in to check for stroke symptoms but found she had an Aneurysm   Current Status: She is still having tingling on the right side toes, right arm and right side of head and neck, this comes and goes. No medications were changed at the ER visit. An EKG and MRI were done. Patient brought her paperwork with her today.      Patient was driving down [place] 3/27 and developed numbness/tingling sensation on right upper arm and right foot, and right jaw numbness along \"like I wanted to pass out\".  Exited off [address] and waited in [employer] parking lot - then went home.  Symptoms lasted after she went home and through the weekend.  The \"passing out\" feeling resolved.    Called our clinic 3/28 and advised ED visit.  Went to [place]. Went into ED 3/28 - Still had sensory changes.    Problem list and histories reviewed & adjusted, as indicated.  Additional history: as documented    Patient Active Problem List   Diagnosis     Essential hypertension     Other specified disorder of rectum and anus     Tear meniscus knee     Hypertriglyceridemia     Vitamin B12 deficiency (non anemic)     CARDIOVASCULAR SCREENING; LDL GOAL LESS THAN 130     Tubular adenoma of colon     Past Surgical History:   Procedure Laterality Date     C APPENDECTOMY  Age 12    Appendectomy     C LIGATE FALLOPIAN TUBE  1984    Tubal Ligation     COLONOSCOPY N/A 11/10/2017    Procedure: COMBINED COLONOSCOPY, SINGLE OR MULTIPLE BIOPSY/POLYPECTOMY BY BIOPSY;  colonoscopy;  Surgeon: Emil Vaz MD;  Location: [facility]     HC KNEE SCOPE,MED+LAT MENIS REPAIR  2009    left knee     SURGICAL HISTORY OF -   2004    Cervical dilatation with curettage and hysteroscopy with rollerball endometrial ablation     SURGICAL HISTORY OF -   1/2004    Laparoscopic " Cholecystectomy     SURGICAL HISTORY OF -       Cryocautery to cervix for bleeding     SURGICAL HISTORY OF -       endometrial ablation     SURGICAL HISTORY OF -   2008    carcinoid tumor rectum  [facility]       Social History   Substance Use Topics     Smoking status: Passive Smoke Exposure - Never Smoker     Smokeless tobacco: Never Used      Comment: exposed to 's cigarette smoke     Alcohol use Yes      Comment: occ     Family History   Problem Relation Age of Onset     CANCER Mother      renal,      Hypertension Father      Respiratory Father      uses cpap     Eye Disorder Father      cataracts     Hypertension Brother      Musculoskeletal Disorder Sister      back surg.     Genitourinary Problems Sister      CANCER Sister      skin cancer     Gynecology Sister      hysterectomy     C.A.D. Paternal Grandmother      Breast Cancer No family hx of      Cancer - colorectal No family hx of      Colon Cancer No family hx of          Current Outpatient Prescriptions   Medication Sig Dispense Refill     predniSONE (DELTASONE) 20 MG tablet Take 1 tablet (20 mg) by mouth daily 5 tablet 0     loratadine (CLARITIN) 10 MG tablet Take 1 tablet (10 mg) by mouth daily 30 tablet 1     hydrochlorothiazide (MICROZIDE) 12.5 MG capsule Take 1 capsule (12.5 mg) by mouth every morning 90 capsule 3     valsartan (DIOVAN) 160 MG tablet Take 1 tablet (160 mg) by mouth daily 90 tablet 3     labetalol (NORMODYNE) 100 MG tablet Take 3 tablets (300 mg) by mouth 2 times daily 540 tablet 3     cyanocobalamin (VITAMIN B12) 1000 MCG/ML injection Inject 1 mL (1,000 mcg) into the muscle every 30 days 3 mL 9     Syringe, Disposable, (SYRINGE/CANNULA) 3 ML MISC 3 ml syringe 12 each 1     NEEDLES, ANY SIZE Use once a month for B12 injection 1 Box 1     Fish Oil OIL 1 capsule twice daily - unknown dose       MULTIVITAMIN TABS   OR 1 TABLET DAILY       CALTRATE 600 + D 600-200 MG-IU OR TABS 2 TABLET DAILY    "    No Known Allergies  Recent Labs   Lab Test  06/26/17   1711  11/18/15   1531   12/24/12   0816  09/27/12   0906   08/13/10   0824   A1C   --    --    --   5.1   --    --    --    LDL   --   81   --    --   94   --   80   HDL   --   55   --    --   59   --   52   TRIG   --    --    --    --   113   --   178*   CR  0.88  0.82   < >   --    --    < >  0.78   GFRESTIMATED  66  72   < >   --    --    < >  78   GFRESTBLACK  80  87   < >   --    --    < >  >90   POTASSIUM  3.5  3.5   < >   --    --    < >  3.9    < > = values in this interval not displayed.      BP Readings from Last 3 Encounters:   04/10/18 129/85   01/31/18 170/65   11/10/17 111/67    Wt Readings from Last 3 Encounters:   11/10/17 175 lb (79.4 kg)   04/01/13 178 lb (80.7 kg)   03/27/12 178 lb (80.7 kg)                  Labs reviewed in EPIC    Reviewed and updated as needed this visit by clinical staff  Allergies       Reviewed and updated as needed this visit by Provider         ROS:  Constitutional, HEENT, cardiovascular, pulmonary, GI, , musculoskeletal, neuro, skin, endocrine and psych systems are negative, except as otherwise noted.    OBJECTIVE:     /85  Pulse 60  Temp 98.6  F (37  C) (Tympanic)  Ht 5' 6\" (1.676 m)  LMP 04/01/2008  SpO2 94%  There is no height or weight on file to calculate BMI.  GENERAL: healthy, alert and no distress  HEENT: Right TM with clear fluid and dullness   NECK: no adenopathy, no asymmetry, masses, or scars and thyroid normal to palpation  RESP: lungs clear to auscultation - no rales, rhonchi or wheezes  CV: regular rate and rhythm, normal S1 S2, no S3 or S4, no murmur, click or rub, no peripheral edema and peripheral pulses strong  ABDOMEN: soft, nontender, no hepatosplenomegaly, no masses and bowel sounds normal  MS: no gross musculoskeletal defects noted, no edema  SKIN: no suspicious lesions or rashes  NEURO: Normal strength and tone, sensory exam grossly normal, light touch and pinprick normal, " mentation intact, speech normal, cranial nerves 2-12 intact, gait normal including heel/toe/tandem walking and Romberg normal  PSYCH: mentation appears normal, affect normal/bright    Diagnostic Test Results:  No results found for this or any previous visit (from the past 24 hour(s)).    ASSESSMENT/PLAN:     1. Dysfunction of right eustachian tube  Discussed findings on exam and reviewed MRI that showed opaque mastoid area.  Has had ongoing sinus symptoms and right ear pain/symptoms for months.  Treated with antibiotics in Jan and sinus symptoms improve but not ear symptoms.    - predniSONE (DELTASONE) 20 MG tablet; Take 1 tablet (20 mg) by mouth daily  Dispense: 5 tablet; Refill: 0  - loratadine (CLARITIN) 10 MG tablet; Take 1 tablet (10 mg) by mouth daily  Dispense: 30 tablet; Refill: 1    2. Essential hypertension  Controlled.      3. Sensory disorder  Unsure of cause of sensory changes - exam unremarkable.  No weakness or sensory deficits identified on exam.  MRI reviewed - ? T2/FLAIR hyper intensities which could represent history of migraines (although patient denies) or ? MS type findings or other.  Patient plans to get second opinion so will follow up on this with Neuro surgeon at that time.  Doubt small 2 cm aneurysm would cause symptoms as reported and that brought her into the ED.  ? TIA vs other unknown cause.    Low likelihood for cardiac cause - EKG SB - no cardiac symptoms, no risk factors, no family or personal history of CAD, MI or cardiac history.    - predniSONE (DELTASONE) 20 MG tablet; Take 1 tablet (20 mg) by mouth daily  Dispense: 5 tablet; Refill: 0    4. Brain aneurysm  Follow up with Neurosurgery as planned.    No need for intervention at this time.      Patient Instructions       What Is a Brain Aneurysm?  Arteries are the blood vessels that lead from the heart to organs such as the brain. A brain aneurysm is a balloon-like bulge in the wall of a brain artery. If this bulge tears and  bleeds, nearby cells may be damaged. A brain aneurysm can occur in an artery wall that is weak or has a defect. Aneurysm is often associated with hardening of the arteries. High blood pressure, heredity, smoking, alcohol abuse, cocaine abuse, and a head injury are also risk factors.    Symptoms  In most cases, a brain aneurysm has no symptoms until it bleeds or tears. Symptoms of bleeding or tearing include a combination of:    Severe headache, nausea, and vomiting    Neck stiffness    Brief blackout    Confusion or sluggishness    Vision or speech problems    Paralysis or weakness on one side of the body    Clumsiness    Jerking movements, such as seizures or convulsions    Coma  Prompt treatment can save a life  A brain aneurysm needs to be evaluated immediately and treated if possible. Doing so may save a person's life. Some aneurysmal bleeding can only be treated with supportive medical care. If the aneurysm has bled, treatment may not reverse the resulting brain damage. But in many cases, surgery may help prevent more bleeding, remove trapped blood in and around the brain, or relieve excessive brain pressure. Other forms of therapy, such as envascular coiling or microvascular clipping, to prevent additional bleeding may be considered.  Note to family and friends  Your loved one s healthcare team will answer any questions you have. After special tests are done and the cause is known, specialists are called. Treatment will begin right away if the aneurysm has already bled. The person may be too ill to know what s going on. You may need to decide on the extent of his or her treatment. Choose a few family members to talk to the healthcare team. These family members can share what they learn with others. Doing this will make it simpler to keep everyone informed. Sometimes, the aneurysm leads to devastating brain injury that results in such severe injury that life support is required. Sometimes, even the most  intensive treatment is not effective in saving someone's life.   Date Last Reviewed: 9/14/2015 2000-2017 The "[employer]. [address] [facility], [place] [address]. All rights reserved. This information is not intended as a substitute for professional medical care. Always follow your healthcare professional's instructions.            Judy Jiménez NP  [facility]    Total times spent with patient 40 minutes of which > 50% of the time was spent counseling and coordination of care discussion of sensory symptoms, reviewed of ED visit, MRI, results, recommendations and follow up ,

## 2018-04-10 NOTE — PATIENT INSTRUCTIONS
What Is a Brain Aneurysm?  Arteries are the blood vessels that lead from the heart to organs such as the brain. A brain aneurysm is a balloon-like bulge in the wall of a brain artery. If this bulge tears and bleeds, nearby cells may be damaged. A brain aneurysm can occur in an artery wall that is weak or has a defect. Aneurysm is often associated with hardening of the arteries. High blood pressure, heredity, smoking, alcohol abuse, cocaine abuse, and a head injury are also risk factors.    Symptoms  In most cases, a brain aneurysm has no symptoms until it bleeds or tears. Symptoms of bleeding or tearing include a combination of:    Severe headache, nausea, and vomiting    Neck stiffness    Brief blackout    Confusion or sluggishness    Vision or speech problems    Paralysis or weakness on one side of the body    Clumsiness    Jerking movements, such as seizures or convulsions    Coma  Prompt treatment can save a life  A brain aneurysm needs to be evaluated immediately and treated if possible. Doing so may save a person's life. Some aneurysmal bleeding can only be treated with supportive medical care. If the aneurysm has bled, treatment may not reverse the resulting brain damage. But in many cases, surgery may help prevent more bleeding, remove trapped blood in and around the brain, or relieve excessive brain pressure. Other forms of therapy, such as envascular coiling or microvascular clipping, to prevent additional bleeding may be considered.  Note to family and friends  Your loved one s healthcare team will answer any questions you have. After special tests are done and the cause is known, specialists are called. Treatment will begin right away if the aneurysm has already bled. The person may be too ill to know what s going on. You may need to decide on the extent of his or her treatment. Choose a few family members to talk to the healthcare team. These family members can share what they learn with others. Doing  this will make it simpler to keep everyone informed. Sometimes, the aneurysm leads to devastating brain injury that results in such severe injury that life support is required. Sometimes, even the most intensive treatment is not effective in saving someone's life.   Date Last Reviewed: 9/14/2015 2000-2017 [employer]. [address] [place] [address], [place] [address]. All rights reserved. This information is not intended as a substitute for professional medical care. Always follow your healthcare professional's instructions.

## 2018-04-10 NOTE — TELEPHONE ENCOUNTER
"Reached her, confirmed the appt today and she has the paperwork to bring with her.   Advised if another episode of syncope to be seen in nearest ED immed, \"ok, thanks for the call. \"    Glenny Onofre RNC    "

## 2018-04-10 NOTE — MR AVS SNAPSHOT
After Visit Summary   4/10/2018    Aretha Hooks    MRN: [id number]           Patient Information     Date Of Birth          1955        Visit Information        Provider Department      4/10/2018 3:00 PM Judy Jiménez NP [facility]        Today's Diagnoses     Sensory disorder    -  1    Dysfunction of right eustachian tube        Essential hypertension        Brain aneurysm          Care Instructions      What Is a Brain Aneurysm?  Arteries are the blood vessels that lead from the heart to organs such as the brain. A brain aneurysm is a balloon-like bulge in the wall of a brain artery. If this bulge tears and bleeds, nearby cells may be damaged. A brain aneurysm can occur in an artery wall that is weak or has a defect. Aneurysm is often associated with hardening of the arteries. High blood pressure, heredity, smoking, alcohol abuse, cocaine abuse, and a head injury are also risk factors.    Symptoms  In most cases, a brain aneurysm has no symptoms until it bleeds or tears. Symptoms of bleeding or tearing include a combination of:    Severe headache, nausea, and vomiting    Neck stiffness    Brief blackout    Confusion or sluggishness    Vision or speech problems    Paralysis or weakness on one side of the body    Clumsiness    Jerking movements, such as seizures or convulsions    Coma  Prompt treatment can save a life  A brain aneurysm needs to be evaluated immediately and treated if possible. Doing so may save a person's life. Some aneurysmal bleeding can only be treated with supportive medical care. If the aneurysm has bled, treatment may not reverse the resulting brain damage. But in many cases, surgery may help prevent more bleeding, remove trapped blood in and around the brain, or relieve excessive brain pressure. Other forms of therapy, such as envascular coiling or microvascular clipping, to prevent additional bleeding may be considered.  Note to family and  friends  Your loved one s healthcare team will answer any questions you have. After special tests are done and the cause is known, specialists are called. Treatment will begin right away if the aneurysm has already bled. The person may be too ill to know what s going on. You may need to decide on the extent of his or her treatment. Choose a few family members to talk to the healthcare team. These family members can share what they learn with others. Doing this will make it simpler to keep everyone informed. Sometimes, the aneurysm leads to devastating brain injury that results in such severe injury that life support is required. Sometimes, even the most intensive treatment is not effective in saving someone's life.   Date Last Reviewed: 9/14/2015 2000-2017 [employer]. [address] [place] [address], [place] [address]. All rights reserved. This information is not intended as a substitute for professional medical care. Always follow your healthcare professional's instructions.                Follow-ups after your visit        Who to contact     If you have questions or need follow up information about today's clinic visit or your schedule please contact [facility] directly at [phone].  Normal or non-critical lab and imaging results will be communicated to you by MyChart, letter or phone within 4 business days after the clinic has received the results. If you do not hear from us within 7 days, please contact the clinic through [facility] or phone. If you have a critical or abnormal lab result, we will notify you by phone as soon as possible.  Submit refill requests through [employer] or call your pharmacy and they will forward the refill request to us. Please allow 3 business days for your refill to be completed.          Additional Information About Your Visit        [facility] Information     [employer] gives you secure access to your electronic health record. If you see a primary care provider, you  "can also send messages to your care team and make appointments. If you have questions, please call your primary care clinic.  If you do not have a primary care provider, please call [phone] and they will assist you.        Care EveryWhere ID     This is your Care EveryWhere ID. This could be used by other organizations to access your [facility] medical records  [id number]        Your Vitals Were     Pulse Temperature Height Last Period Pulse Oximetry       60 98.6  F (37  C) (Tympanic) 5' 6\" (1.676 m) 04/01/2008 94%        Blood Pressure from Last 3 Encounters:   04/10/18 129/85   01/31/18 170/65   11/10/17 111/67    Weight from Last 3 Encounters:   11/10/17 175 lb (79.4 kg)   04/01/13 178 lb (80.7 kg)   03/27/12 178 lb (80.7 kg)              Today, you had the following     No orders found for display         Today's Medication Changes          These changes are accurate as of 4/10/18  4:04 PM.  If you have any questions, ask your nurse or doctor.               Start taking these medicines.        Dose/Directions    loratadine 10 MG tablet   Commonly known as:  CLARITIN   Used for:  Dysfunction of right eustachian tube   Started by:  Judy Jiménez NP        Dose:  10 mg   Take 1 tablet (10 mg) by mouth daily   Quantity:  30 tablet   Refills:  1       predniSONE 20 MG tablet   Commonly known as:  DELTASONE   Used for:  Dysfunction of right eustachian tube, Sensory disorder   Started by:  Judy Jiménez NP        Dose:  20 mg   Take 1 tablet (20 mg) by mouth daily   Quantity:  5 tablet   Refills:  0            Where to get your medicines      These medications were sent to [id number] - [place], [place] - [address]  [address] [address]     Phone:  [phone]     loratadine 10 MG tablet    predniSONE 20 MG tablet                Primary Care Provider Office Phone # Fax #    Judy Jiménez [phone] [address]  [facility] [address]        Equal Access to Services     CAIN LOPEZ : Hadii aad ku hadcoreyjose Sohaileeali, waaxda luqadaha, qaybta kaalmafroilan valladares. So [facility] [phone].    ATENCIÓN: Si habla español, tiene a kate disposición servicios gratuitos de asistencia lingüística. Yayaame al [phone].    We comply with applicable federal civil rights laws and [place] laws. We do not discriminate on the basis of race, color, national origin, age, disability, sex, sexual orientation, or gender identity.            Thank you!     Thank you for choosing [facility]  for your care. Our goal is always to provide you with excellent care. Hearing back from our patients is one way we can continue to improve our services. Please take a few minutes to complete the written survey that you may receive in the mail after your visit with us. Thank you!             Your Updated Medication List - Protect others around you: Learn how to safely use, store and throw away your medicines at www.disposemymeds.org.          This list is accurate as of 4/10/18  4:04 PM.  Always use your most recent med list.                   Brand Name Dispense Instructions for use Diagnosis    CALTRATE 600 + D 600-200 MG-IU Tabs      2 TABLET DAILY        cyanocobalamin 1000 MCG/ML injection    VITAMIN B12    3 mL    Inject 1 mL (1,000 mcg) into the muscle every 30 days    Vitamin B12 deficiency (non anemic)       Fish Oil Oil      1 capsule twice daily - unknown dose        hydrochlorothiazide 12.5 MG capsule    MICROZIDE    90 capsule    Take 1 capsule (12.5 mg) by mouth every morning    Essential hypertension with goal blood pressure less than 140/90       labetalol 100 MG tablet    NORMODYNE    540 tablet    Take 3 tablets (300 mg) by mouth 2 times daily    Essential hypertension with goal blood pressure less than 140/90       loratadine 10 MG tablet    CLARITIN    30 tablet    Take 1 tablet (10 mg) by  mouth daily    Dysfunction of right eustachian tube       MULTIVITAMIN TABS   OR      1 TABLET DAILY        NEEDLES, ANY SIZE     1 Box    Use once a month for B12 injection    Vitamin B12 deficiency (non anemic)       predniSONE 20 MG tablet    DELTASONE    5 tablet    Take 1 tablet (20 mg) by mouth daily    Dysfunction of right eustachian tube, Sensory disorder       Syringe/Cannula 3 ML Misc     12 each    3 ml syringe    Vitamin B12 deficiency (non anemic)       valsartan 160 MG tablet    DIOVAN    90 tablet    Take 1 tablet (160 mg) by mouth daily    Essential hypertension with goal blood pressure less than 140/90

## 2018-04-10 NOTE — TELEPHONE ENCOUNTER
See me as scheduled 4/10 - plan to bring paperwork.    Unless if she has another episode of syncope then may need to be seen in ED.  SELAM Stein

## 2018-04-11 PROBLEM — R20.9 SENSORY DISORDER: Status: ACTIVE | Noted: 2018-04-11

## 2018-04-11 PROBLEM — I67.1 BRAIN ANEURYSM: Status: ACTIVE | Noted: 2018-04-11

## 2018-06-09 ENCOUNTER — TELEPHONE (OUTPATIENT)
Dept: FAMILY MEDICINE | Facility: CLINIC | Age: 63
End: 2018-06-09

## 2018-06-09 DIAGNOSIS — I10 ESSENTIAL HYPERTENSION WITH GOAL BLOOD PRESSURE LESS THAN 140/90: ICD-10-CM

## 2018-06-11 NOTE — TELEPHONE ENCOUNTER
Patient will be due for labs. Left message for patient to return call to clinic.   Arielle Hollis RN

## 2018-06-11 NOTE — TELEPHONE ENCOUNTER
"Requested Prescriptions   Pending Prescriptions Disp Refills     valsartan (DIOVAN) 160 MG tablet [Pharmacy Med Name: Valsartan Oral Tablet 160 MG]  Last Written Prescription Date:  06/26/17  Last Fill Quantity: 90,  # refills: 3   Last office visit: 4/10/2018 with prescribing provider:  04/10/18   Future Office Visit:     30 tablet 2     Sig: TAKE ONE TABLET BY MOUTH ONE TIME DAILY    Angiotensin-II Receptors Passed    6/9/2018  7:02 AM       Passed - Blood pressure under 140/90 in past 12 months    BP Readings from Last 3 Encounters:   04/10/18 129/85   01/31/18 170/65   11/10/17 111/67          Passed - Recent (12 mo) or future (30 days) visit within the authorizing provider's specialty    Patient had office visit in the last 12 months or has a visit in the next 30 days with authorizing provider or within the authorizing provider's specialty.  See \"Patient Info\" tab in inbasket, or \"Choose Columns\" in Meds & Orders section of the refill encounter.           Passed - Patient is age 18 or older       Passed - No active pregnancy on record       Passed - Normal serum creatinine on file in past 12 months    Recent Labs   Lab Test  06/26/17   1711   CR  0.88          Passed - Normal serum potassium on file in past 12 months    Recent Labs   Lab Test  06/26/17   1711   POTASSIUM  3.5          Passed - No positive pregnancy test in past 12 months          "

## 2018-06-12 RX ORDER — VALSARTAN 160 MG/1
TABLET ORAL
Qty: 30 TABLET | Refills: 0 | Status: SHIPPED | OUTPATIENT
Start: 2018-06-12 | End: 2019-05-14

## 2018-06-12 NOTE — TELEPHONE ENCOUNTER
Medication is being filled for 1 time refill only due to:  Patient needs labs potassium and creat. Future labs ordered potassium and creat.   Glenny Onofre RNC

## 2018-06-20 DIAGNOSIS — I10 ESSENTIAL HYPERTENSION WITH GOAL BLOOD PRESSURE LESS THAN 140/90: ICD-10-CM

## 2018-06-20 RX ORDER — HYDROCHLOROTHIAZIDE 12.5 MG/1
12.5 CAPSULE ORAL EVERY MORNING
Qty: 30 CAPSULE | Refills: 0 | Status: SHIPPED | OUTPATIENT
Start: 2018-06-20 | End: 2018-07-17

## 2018-06-20 NOTE — TELEPHONE ENCOUNTER
"Requested Prescriptions   Pending Prescriptions Disp Refills     hydrochlorothiazide (MICROZIDE) 12.5 MG capsule [Pharmacy Med Name: HydroCHLOROthiazide Oral Capsule 12.5 MG]  Last Written Prescription Date:  06/26/17  Last Fill Quantity: 90,  # refills: 3   Last office visit: 4/10/2018 with prescribing provider:  04/10/18   Future Office Visit:     30 capsule 2     Sig: TAKE ONE CAPSULE BY MOUTH IN THE MORNING    Diuretics (Including Combos) Protocol Passed    6/20/2018  7:01 AM       Passed - Blood pressure under 140/90 in past 12 months    BP Readings from Last 3 Encounters:   04/10/18 129/85   01/31/18 170/65   11/10/17 111/67          Passed - Recent (12 mo) or future (30 days) visit within the authorizing provider's specialty    Patient had office visit in the last 12 months or has a visit in the next 30 days with authorizing provider or within the authorizing provider's specialty.  See \"Patient Info\" tab in inbasket, or \"Choose Columns\" in Meds & Orders section of the refill encounter.           Passed - Patient is age 18 or older       Passed - No active pregancy on record       Passed - Normal serum creatinine on file in past 12 months    Recent Labs   Lab Test  06/26/17   1711   CR  0.88          Passed - Normal serum potassium on file in past 12 months    Recent Labs   Lab Test  06/26/17   1711   POTASSIUM  3.5           Passed - Normal serum sodium on file in past 12 months    Recent Labs   Lab Test  06/26/17   1711   NA  141          Passed - No positive pregnancy test in past 12 months          "

## 2018-07-05 DIAGNOSIS — I10 ESSENTIAL HYPERTENSION WITH GOAL BLOOD PRESSURE LESS THAN 140/90: ICD-10-CM

## 2018-07-05 NOTE — TELEPHONE ENCOUNTER
"Requested Prescriptions   Pending Prescriptions Disp Refills     labetalol (NORMODYNE) 100 MG tablet [Pharmacy Med Name: Labetalol HCl Oral Tablet 100 MG]  Last Written Prescription Date:  06/26/17  Last Fill Quantity: 540,  # refills: 3   Last office visit: 4/10/2018 with prescribing provider:  04/10/18   Future Office Visit:   Next 5 appointments (look out 90 days)     Jul 17, 2018  3:00 PM CDT   PHYS[facility] with Judy Jiménez NP   [facility]    [address], [place] [address]   [phone]               180 tablet 2     Sig: TAKE THREE TABLETS BY MOUTH TWO TIMES A DAY    Beta-Blockers Protocol Passed    7/5/2018  7:00 AM       Passed - Blood pressure under 140/90 in past 12 months    BP Readings from Last 3 Encounters:   04/10/18 129/85   01/31/18 170/65   11/10/17 111/67          Passed - Patient is age 6 or older       Passed - Recent (12 mo) or future (30 days) visit within the authorizing provider's specialty    Patient had office visit in the last 12 months or has a visit in the next 30 days with authorizing provider or within the authorizing provider's specialty.  See \"Patient Info\" tab in inbasket, or \"Choose Columns\" in Meds & Orders section of the refill encounter.              "

## 2018-07-06 RX ORDER — LABETALOL 100 MG/1
TABLET, FILM COATED ORAL
Qty: 180 TABLET | Refills: 8 | Status: SHIPPED | OUTPATIENT
Start: 2018-07-06 | End: 2018-07-17

## 2018-07-06 NOTE — TELEPHONE ENCOUNTER
Prescription approved per [facility] Refill Protocol.  Reviewed 4/10/18 OV notes.  Scheduled for appt with PCP 7/17/18.    BP Readings from Last 3 Encounters:   04/10/18 129/85   01/31/18 170/65   11/10/17 111/67     Rashida PRADHAN RN

## 2018-07-17 ENCOUNTER — OFFICE VISIT (OUTPATIENT)
Dept: FAMILY MEDICINE | Facility: CLINIC | Age: 63
End: 2018-07-17
Payer: COMMERCIAL

## 2018-07-17 VITALS
SYSTOLIC BLOOD PRESSURE: 108 MMHG | DIASTOLIC BLOOD PRESSURE: 82 MMHG | TEMPERATURE: 98.6 F | HEIGHT: 65 IN | HEART RATE: 72 BPM

## 2018-07-17 DIAGNOSIS — H69.91 ETD (EUSTACHIAN TUBE DYSFUNCTION), RIGHT: ICD-10-CM

## 2018-07-17 DIAGNOSIS — E53.8 VITAMIN B12 DEFICIENCY (NON ANEMIC): ICD-10-CM

## 2018-07-17 DIAGNOSIS — Z00.01 ENCOUNTER FOR ROUTINE ADULT HEALTH EXAMINATION WITH ABNORMAL FINDINGS: Primary | ICD-10-CM

## 2018-07-17 DIAGNOSIS — I10 ESSENTIAL HYPERTENSION WITH GOAL BLOOD PRESSURE LESS THAN 140/90: ICD-10-CM

## 2018-07-17 DIAGNOSIS — Z13.6 CARDIOVASCULAR SCREENING; LDL GOAL LESS THAN 130: ICD-10-CM

## 2018-07-17 PROCEDURE — 99396 PREV VISIT EST AGE 40-64: CPT | Performed by: NURSE PRACTITIONER

## 2018-07-17 RX ORDER — HYDROCHLOROTHIAZIDE 12.5 MG/1
12.5 CAPSULE ORAL EVERY MORNING
Qty: 90 CAPSULE | Refills: 3 | Status: SHIPPED | OUTPATIENT
Start: 2018-07-17 | End: 2019-05-14

## 2018-07-17 RX ORDER — LOSARTAN POTASSIUM 50 MG/1
50 TABLET ORAL DAILY
Qty: 90 TABLET | Refills: 3 | Status: SHIPPED | OUTPATIENT
Start: 2018-07-17 | End: 2019-05-14

## 2018-07-17 RX ORDER — VALSARTAN 160 MG/1
160 TABLET ORAL DAILY
Qty: 30 TABLET | Refills: 0 | Status: CANCELLED | OUTPATIENT
Start: 2018-07-17

## 2018-07-17 RX ORDER — LABETALOL 100 MG/1
TABLET, FILM COATED ORAL
Qty: 180 TABLET | Refills: 11 | Status: SHIPPED | OUTPATIENT
Start: 2018-07-17 | End: 2019-05-14

## 2018-07-17 RX ORDER — CYANOCOBALAMIN 1000 UG/ML
1 INJECTION, SOLUTION INTRAMUSCULAR; SUBCUTANEOUS
Qty: 3 ML | Refills: 9 | Status: SHIPPED | OUTPATIENT
Start: 2018-07-17 | End: 2019-07-17

## 2018-07-17 NOTE — PROGRESS NOTES
SUBJECTIVE:   CC: Aretha Hooks is an 62 year old woman who presents for preventive health visit.   Patient is eligible for use of low-dose aspirin for primary prevention of heart attack and stroke.  Provider has discussed aspirin with patient and our decision was:     Chief Complaint   Patient presents with     Physical     Physical, and Needs All Meds Renewed.       Prescribe:  Daily low-dose aspirin recommended for primary prevention, patient agrees with plan.      The 10-year ASCVD risk score (Langfordnoreen HELM Jr, et al., 2013) is: 2.5%    Values used to calculate the score:      Age: 62 years      Sex: Female      Is Non- : No      Diabetic: No      Tobacco smoker: No      Systolic Blood Pressure: 108 mmHg      Is BP treated: Yes      HDL Cholesterol: 55 mg/dL      Total Cholesterol: 146 mg/dL    Healthy Habits:    Do you get at least three servings of calcium containing foods daily (dairy, green leafy vegetables, etc.)? yes    Amount of exercise or daily activities, outside of work: walking     Problems taking medications regularly No    Medication side effects: No    Have you had an eye exam in the past two years? yes    Do you see a dentist twice per year? yes    Do you have sleep apnea, excessive snoring or daytime drowsiness?no      Hyperlipidemia Follow-Up      Rate your low fat/cholesterol diet?: good    Taking statin?  No    Other lipid medications/supplements?:  none    Hypertension Follow-up      Outpatient blood pressures are not being checked.    Low Salt Diet: no added salt      Today's PHQ-2 Score:   PHQ-2 ( 1999 [employer]) 7/17/2018 6/26/2017   Q1: Little interest or pleasure in doing things 0 0   Q2: Feeling down, depressed or hopeless 0 1   PHQ-2 Score 0 1       Abuse: Current or Past(Physical, Sexual or Emotional)- No  Do you feel safe in your environment - Yes    Social History   Substance Use Topics     Smoking status: Passive Smoke Exposure - Never Smoker     Smokeless  tobacco: Never Used      Comment: exposed to 's cigarette smoke     Alcohol use Yes      Comment: occ     If you drink alcohol do you typically have >3 drinks per day or >7 drinks per week? No                     Reviewed orders with patient.  Reviewed health maintenance and updated orders accordingly - Yes  Labs reviewed in EPIC  BP Readings from Last 3 Encounters:   07/17/18 108/82   04/10/18 129/85   01/31/18 170/65    Wt Readings from Last 3 Encounters:   11/10/17 175 lb (79.4 kg)   04/01/13 178 lb (80.7 kg)   03/27/12 178 lb (80.7 kg)                  Patient Active Problem List   Diagnosis     Essential hypertension     Other specified disorder of rectum and anus     Tear meniscus knee     Hypertriglyceridemia     Vitamin B12 deficiency (non anemic)     CARDIOVASCULAR SCREENING; LDL GOAL LESS THAN 130     Tubular adenoma of colon     Brain aneurysm     Sensory disorder     Past Surgical History:   Procedure Laterality Date     C APPENDECTOMY  Age 12    Appendectomy     C LIGATE FALLOPIAN TUBE  1984    Tubal Ligation     COLONOSCOPY N/A 11/10/2017    Procedure: COMBINED COLONOSCOPY, SINGLE OR MULTIPLE BIOPSY/POLYPECTOMY BY BIOPSY;  colonoscopy;  Surgeon: Emil Vaz MD;  Location: [facility]      KNEE SCOPE,MED+LAT MENIS REPAIR  2009    left knee     SURGICAL HISTORY OF -   2004    Cervical dilatation with curettage and hysteroscopy with rollerball endometrial ablation     SURGICAL HISTORY OF -   1/2004    Laparoscopic Cholecystectomy     SURGICAL HISTORY OF -   1988    Cryocautery to cervix for bleeding     SURGICAL HISTORY OF -   2004    endometrial ablation     SURGICAL HISTORY OF -   1/2008    carcinoid tumor rectum  [facility]       Social History   Substance Use Topics     Smoking status: Passive Smoke Exposure - Never Smoker     Smokeless tobacco: Never Used      Comment: exposed to 's cigarette smoke     Alcohol use Yes      Comment: occ     Family History    Problem Relation Age of Onset     Cancer Mother      Renal     Hypertension Father      Respiratory Father      uses cpap     Eye Disorder Father      cataracts     Hypertension Brother      Musculoskeletal Disorder Sister      back surg.     Genitourinary Problems Sister      Cancer Sister      skin cancer     Gynecology Sister      hysterectomy     C.A.D. Paternal Grandmother      Breast Cancer No family hx of      Cancer - colorectal No family hx of      Colon Cancer No family hx of          Current Outpatient Prescriptions   Medication Sig Dispense Refill     CALTRATE 600 + D 600-200 MG-IU OR TABS 2 TABLET DAILY       cyanocobalamin (VITAMIN B12) 1000 MCG/ML injection Inject 1 mL (1,000 mcg) into the muscle every 30 days 3 mL 9     Fish Oil OIL 1 capsule twice daily - unknown dose       hydrochlorothiazide (MICROZIDE) 12.5 MG capsule Take 1 capsule (12.5 mg) by mouth every morning (Needs lab work) 30 capsule 0     labetalol (NORMODYNE) 100 MG tablet TAKE THREE TABLETS BY MOUTH TWO TIMES A DAY  180 tablet 8     MULTIVITAMIN TABS   OR 1 TABLET DAILY       NEEDLES, ANY SIZE Use once a month for B12 injection 1 Box 1     Syringe, Disposable, (SYRINGE/CANNULA) 3 ML MISC 3 ml syringe 12 each 1     valsartan (DIOVAN) 160 MG tablet TAKE ONE TABLET BY MOUTH ONE TIME DAILY  30 tablet 0     loratadine (CLARITIN) 10 MG tablet Take 1 tablet (10 mg) by mouth daily (Patient not taking: Reported on 7/17/2018) 30 tablet 1     No Known Allergies  Recent Labs   Lab Test  06/26/17   1711  11/18/15   1531   12/24/12   0816  09/27/12   0906   08/13/10   0824   A1C   --    --    --   5.1   --    --    --    LDL   --   81   --    --   94   --   80   HDL   --   55   --    --   59   --   52   TRIG   --    --    --    --   113   --   178*   CR  0.88  0.82   < >   --    --    < >  0.78   GFRESTIMATED  66  72   < >   --    --    < >  78   GFRESTBLACK  80  87   < >   --    --    < >  >90   POTASSIUM  3.5  3.5   < >   --    --    < >  3.9     < > = values in this interval not displayed.        Patient over age 50, mutual decision to screen reflected in health maintenance.    Pertinent mammograms are reviewed under the imaging tab.  History of abnormal Pap smear:   NO - age 30- 65 PAP every 3 years recommended  Last 3 Pap Results:   PAP (no units)   Date Value   2016 NIL   2013 NIL   2012 NIL     PAP / HPV Latest Ref Rng & Units 2016 2013 3/27/2012   PAP - NIL NIL NIL   HPV 16 DNA NEG Negative - -   HPV 18 DNA NEG Negative - -   OTHER HR HPV NEG Negative - -     Reviewed and updated as needed this visit by clinical staff  Tobacco  Allergies  Meds  Med Hx  Surg Hx  Fam Hx  Soc Hx        Reviewed and updated as needed this visit by Provider          Past Medical History:   Diagnosis Date     ASCUS of cervix with negative high risk HPV 3/2011    per [facility] repeat Pap + HPV cotesting in 3 years     Carcinoid tumor  of colon      Other vitamin B12 deficiency anemia     injection of B12 monthly     Unspecified essential hypertension       Past Surgical History:   Procedure Laterality Date     C APPENDECTOMY  Age 12    Appendectomy     C LIGATE FALLOPIAN TUBE  1984    Tubal Ligation     COLONOSCOPY N/A 11/10/2017    Procedure: COMBINED COLONOSCOPY, SINGLE OR MULTIPLE BIOPSY/POLYPECTOMY BY BIOPSY;  colonoscopy;  Surgeon: Emil Vaz MD;  Location: [facility]     HC KNEE SCOPE,MED+LAT MENIS REPAIR  2009    left knee     SURGICAL HISTORY OF -       Cervical dilatation with curettage and hysteroscopy with rollerball endometrial ablation     SURGICAL HISTORY OF -   2004    Laparoscopic Cholecystectomy     SURGICAL HISTORY OF -       Cryocautery to cervix for bleeding     SURGICAL HISTORY OF -       endometrial ablation     SURGICAL HISTORY OF -   2008    carcinoid tumor rectum  [facility]     Obstetric History       T0      L4     SAB0   TAB0   Ectopic0   Multiple0   Live  "Births0       # Outcome Date GA Lbr Tyron/2nd Weight Sex Delivery Anes PTL Lv   4 Para            3 Para            2 Para            1 Para                   ROS:  CONSTITUTIONAL: NEGATIVE for fever, chills, change in weight  INTEGUMENTARY/SKIN: NEGATIVE for worrisome rashes, moles or lesions  EYES: NEGATIVE for vision changes or irritation  ENT: NEGATIVE for ear, mouth and throat problems  RESP: NEGATIVE for significant cough or SOB  BREAST: NEGATIVE for masses, tenderness or discharge  CV: NEGATIVE for chest pain, palpitations or peripheral edema  GI: NEGATIVE for nausea, abdominal pain, heartburn, or change in bowel habits  : NEGATIVE for unusual urinary or vaginal symptoms. No vaginal bleeding.  MUSCULOSKELETAL: NEGATIVE for significant arthralgias or myalgia  NEURO: NEGATIVE for weakness, dizziness or paresthesias  ENDOCRINE: NEGATIVE for temperature intolerance, skin/hair changes  PSYCHIATRIC: NEGATIVE for changes in mood or affect     OBJECTIVE:   /82 (BP Location: Right arm, Patient Position: Chair, Cuff Size: Adult Large)  Pulse 72  Temp 98.6  F (37  C) (Tympanic)  Ht 5' 4.5\" (1.638 m)  LMP 2008  Breastfeeding? No  EXAM:  GENERAL: healthy, alert and no distress  EYES: Eyes grossly normal to inspection, PERRL and conjunctivae and sclerae normal  HENT: ear canals and TM's normal left, right with effusion - yellow, nose and mouth without ulcers or lesions  NECK: no adenopathy, no asymmetry, masses, or scars and thyroid normal to palpation  RESP: lungs clear to auscultation - no rales, rhonchi or wheezes  BREAST: normal without masses, tenderness or nipple discharge and no palpable axillary masses or adenopathy  CV: regular rate and rhythm, normal S1 S2, no S3 or S4, no murmur, click or rub, no peripheral edema and peripheral pulses strong  ABDOMEN: soft, nontender, no hepatosplenomegaly, no masses and bowel sounds normal  MS: no gross musculoskeletal defects noted, no " "edema  SKIN: no suspicious lesions or rashes  NEURO: Normal strength and tone, mentation intact and speech normal  PSYCH: mentation appears normal, affect normal/bright  LYMPH: no cervical, supraclavicular, axillary, or inguinal adenopathy    Diagnostic Test Results:  No results found for this or any previous visit (from the past 24 hour(s)).    ASSESSMENT/PLAN:   1. Encounter for routine adult health examination with abnormal findings       2. ETD (Eustachian tube dysfunction), right  Advised Flonase for the next month and follow up with ENT if not improving.    - OTOLARYNGOLOGY REFERRAL    3. Vitamin B12 deficiency (non anemic)     - cyanocobalamin (VITAMIN B12) 1000 MCG/ML injection; Inject 1 mL (1,000 mcg) into the muscle every 30 days  Dispense: 3 mL; Refill: 9  - NEEDLES, ANY SIZE; Use once a month for B12 injection  Dispense: 1 Box; Refill: 1  - Syringe, Disposable, (SYRINGE/CANNULA) 3 ML MISC; 3 ml syringe  Dispense: 12 each; Refill: 1    4. Essential hypertension with goal blood pressure less than 140/90     - hydrochlorothiazide (MICROZIDE) 12.5 MG capsule; Take 1 capsule (12.5 mg) by mouth every morning  Dispense: 90 capsule; Refill: 3  - losartan (COZAAR) 50 MG tablet; Take 1 tablet (50 mg) by mouth daily  Dispense: 90 tablet; Refill: 3  - labetalol (NORMODYNE) 100 MG tablet; TAKE THREE TABLETS BY MOUTH TWO TIMES A DAY  Dispense: 180 tablet; Refill: 11    5. CARDIOVASCULAR SCREENING; LDL GOAL LESS THAN 130         COUNSELING:   Reviewed preventive health counseling, as reflected in patient instructions       Regular exercise       Healthy diet/nutrition       Vision screening       Aspirin Prophylaxsis       Osteoporosis Prevention/Bone Health       Colon cancer screening    BP Readings from Last 1 Encounters:   07/17/18 108/82     Estimated body mass index is 28.25 kg/(m^2) as calculated from the following:    Height as of 11/10/17: 5' 6\" (1.676 m).    Weight as of 11/10/17: 175 lb (79.4 " kg).      Weight management plan: Discussed healthy diet and exercise guidelines and patient will follow up in 12 months in clinic to re-evaluate.     reports that she is a non-smoker but has been exposed to tobacco smoke. She has never used smokeless tobacco.      Counseling Resources:  ATP IV Guidelines  Pooled Cohorts Equation Calculator  Breast Cancer Risk Calculator  FRAX Risk Assessment  ICSI Preventive Guidelines  Dietary Guidelines for Americans, 2010  USDA's MyPlate  ASA Prophylaxis  Lung CA Screening    Judy Jiménez NP  [facility]

## 2018-07-17 NOTE — PATIENT INSTRUCTIONS
Preventive Health Recommendations  Losartan 50 mg once daily - stop Valsartan 160 mg  Monitor blood pressure and call or Mychart me if blood pressure > 130/80.    SELAM Stein    Female Ages 50 - 64    Yearly exam: See your health care provider every year in order to  o Review health changes.   o Discuss preventive care.    o Review your medicines if your doctor has prescribed any.      Get a Pap test every three years (unless you have an abnormal result and your provider advises testing more often).    If you get Pap tests with HPV test, you only need to test every 5 years, unless you have an abnormal result.     You do not need a Pap test if your uterus was removed (hysterectomy) and you have not had cancer.    You should be tested each year for STDs (sexually transmitted diseases) if you're at risk.     Have a mammogram every 1 to 2 years.    Have a colonoscopy at age 50, or have a yearly FIT test (stool test). These exams screen for colon cancer.      Have a cholesterol test every 5 years, or more often if advised.    Have a diabetes test (fasting glucose) every three years. If you are at risk for diabetes, you should have this test more often.     If you are at risk for osteoporosis (brittle bone disease), think about having a bone density scan (DEXA).    Shots: Get a flu shot each year. Get a tetanus shot every 10 years.    Nutrition:     Eat at least 5 servings of fruits and vegetables each day.    Eat whole-grain bread, whole-wheat pasta and brown rice instead of white grains and rice.    Get adequate Calcium and Vitamin D.     Lifestyle    Exercise at least 150 minutes a week (30 minutes a day, 5 days a week). This will help you control your weight and prevent disease.    Limit alcohol to one drink per day.    No smoking.     Wear sunscreen to prevent skin cancer.     See your dentist every six months for an exam and cleaning.    See your eye doctor every 1 to 2 years.        Thank you for  [facility].  You may be receiving a survey in the mail from [facility] [employer] regarding your visit today.  Please take a few minutes to complete and return the survey to let us know how we are doing.      If you have questions or concerns, please contact us via [employer] or you can contact your care team at [phone].    Our Clinic hours are:  Monday 6:40 am  to 7:00 pm  Tuesday -Friday 6:40 am to 5:00 pm    The [place] outpatient lab hours are:  Monday - Friday 6:10 am to 4:45 pm  Saturdays 7:00 am to 11:00 am  Appointments are required, call [phone]    If you have clinical questions after hours or would like to schedule an appointment,  call the clinic at [phone].

## 2018-07-17 NOTE — MR AVS SNAPSHOT
After Visit Summary   7/17/2018    Aretha Hooks    MRN: [id number]           Patient Information     Date Of Birth          1955        Visit Information        Provider Department      7/17/2018 3:00 PM Judy Jiménez NP [facility]        Today's Diagnoses     Encounter for routine adult health examination with abnormal findings    -  1    ETD (Eustachian tube dysfunction), right        Vitamin B12 deficiency (non anemic)        Essential hypertension with goal blood pressure less than 140/90        CARDIOVASCULAR SCREENING; LDL GOAL LESS THAN 130          Care Instructions      Preventive Health Recommendations  Losartan 50 mg once daily - stop Valsartan 160 mg  Monitor blood pressure and call or Mychart me if blood pressure > 130/80.    SELAM Stein    Female Ages 50 - 64    Yearly exam: See your health care provider every year in order to  o Review health changes.   o Discuss preventive care.    o Review your medicines if your doctor has prescribed any.      Get a Pap test every three years (unless you have an abnormal result and your provider advises testing more often).    If you get Pap tests with HPV test, you only need to test every 5 years, unless you have an abnormal result.     You do not need a Pap test if your uterus was removed (hysterectomy) and you have not had cancer.    You should be tested each year for STDs (sexually transmitted diseases) if you're at risk.     Have a mammogram every 1 to 2 years.    Have a colonoscopy at age 50, or have a yearly FIT test (stool test). These exams screen for colon cancer.      Have a cholesterol test every 5 years, or more often if advised.    Have a diabetes test (fasting glucose) every three years. If you are at risk for diabetes, you should have this test more often.     If you are at risk for osteoporosis (brittle bone disease), think about having a bone density scan (DEXA).    Shots: Get a flu shot each  year. Get a tetanus shot every 10 years.    Nutrition:     Eat at least 5 servings of fruits and vegetables each day.    Eat whole-grain bread, whole-wheat pasta and brown rice instead of white grains and rice.    Get adequate Calcium and Vitamin D.     Lifestyle    Exercise at least 150 minutes a week (30 minutes a day, 5 days a week). This will help you control your weight and prevent disease.    Limit alcohol to one drink per day.    No smoking.     Wear sunscreen to prevent skin cancer.     See your dentist every six months for an exam and cleaning.    See your eye doctor every 1 to 2 years.        Thank you for choosing [facility].  You may be receiving a survey in the mail from [employer] regarding your visit today.  Please take a few minutes to complete and return the survey to let us know how we are doing.      If you have questions or concerns, please contact us via [employer] or you can contact your care team at [phone].    Our Clinic hours are:  Monday 6:40 am  to 7:00 pm  Tuesday -Friday 6:40 am to 5:00 pm    The [place] outpatient lab hours are:  Monday - Friday 6:10 am to 4:45 pm  Saturdays 7:00 am to 11:00 am  Appointments are required, call [phone]    If you have clinical questions after hours or would like to schedule an appointment,  call the clinic at [phone].            Follow-ups after your visit        Additional Services     OTOLARYNGOLOGY REFERRAL       Your provider has referred you to: [facility]: [facility] [phone]   [email or web address]    Please be aware that coverage of these services is subject to the terms and limitations of your health insurance plan.  Call member services at your health plan with any benefit or coverage questions.      Please bring the following with you to your appointment:    (1) Any X-Rays, CTs or MRIs which have been performed.  Contact the facility where they were done to arrange for  prior to  "your scheduled appointment.   (2) List of current medications  (3) This referral request   (4) Any documents/labs given to you for this referral                  Your next 10 appointments already scheduled     Jul 23, 2018  6:45 PM CDT   (Arrive by 6:30 PM)   MA SCREENING BILATERAL W/ SALOME with [facility]   [facility] ([facility])    [address]  [facility] [address]   [phone]           Three-dimensional (3D) mammograms are available at [place] locations in [facility], [facility], [place], [facility], [place], [place], and [place]. -Health locations include [place] and [facility] in [place]. Benefits of 3D mammograms include: - Improved rate of cancer detection - Decreases your chance of having to go back for more tests, which means fewer: - \"False-positive\" results (This means that there is an abnormal area but it isn't cancer.) - Invasive testing procedures, such as a biopsy or surgery - Can provide clearer images of the breast if you have dense breast tissue. 3D mammography is an optional exam that anyone can have with a 2D mammogram. It doesn't replace or take the place of a 2D mammogram. 2D mammograms remain an effective screening test for all women.  Not all insurance companies cover the cost of a 3D mammogram. Check with your insurance.              Who to contact     If you have questions or need follow up information about today's clinic visit or your schedule please contact [facility] directly at [phone].  Normal or non-critical lab and imaging results will be communicated to you by MyChart, letter or phone within 4 business days after the clinic has received the results. If you do not hear from us within 7 days, please contact the clinic through [employer] or phone. If you have a critical or abnormal lab result, we will notify you by phone as soon as possible.  Submit refill requests through [employer] or call your " "pharmacy and they will forward the refill request to us. Please allow 3 business days for your refill to be completed.          Additional Information About Your Visit        [employer] Information     [employer] gives you secure access to your electronic health record. If you see a primary care provider, you can also send messages to your care team and make appointments. If you have questions, please call your primary care clinic.  If you do not have a primary care provider, please call [phone] and they will assist you.        Care EveryWhere ID     This is your Care EveryWhere ID. This could be used by other organizations to access your [facility] medical records  [id number]        Your Vitals Were     Pulse Temperature Height Last Period Breastfeeding?       72 98.6  F (37  C) (Tympanic) 5' 4.5\" (1.638 m) 04/01/2008 No        Blood Pressure from Last 3 Encounters:   07/17/18 108/82   04/10/18 129/85   01/31/18 170/65    Weight from Last 3 Encounters:   11/10/17 175 lb (79.4 kg)   04/01/13 178 lb (80.7 kg)   03/27/12 178 lb (80.7 kg)              We Performed the Following     OTOLARYNGOLOGY REFERRAL          Today's Medication Changes          These changes are accurate as of 7/17/18  3:45 PM.  If you have any questions, ask your nurse or doctor.               Start taking these medicines.        Dose/Directions    losartan 50 MG tablet   Commonly known as:  COZAAR   Used for:  Essential hypertension with goal blood pressure less than 140/90   Started by:  Judy Jiménez NP        Dose:  50 mg   Take 1 tablet (50 mg) by mouth daily   Quantity:  90 tablet   Refills:  3         These medicines have changed or have updated prescriptions.        Dose/Directions    hydrochlorothiazide 12.5 MG capsule   Commonly known as:  MICROZIDE   This may have changed:  additional instructions   Used for:  Essential hypertension with goal blood pressure less than 140/90   Changed by:  Judy Jiménez NP        Dose:  " 12.5 mg   Take 1 capsule (12.5 mg) by mouth every morning   Quantity:  90 capsule   Refills:  3            Where to get your medicines      These medications were sent to [id number] - [place], [place] - [address]  [address] [address]     Phone:  [phone]     cyanocobalamin 1000 MCG/ML injection    hydrochlorothiazide 12.5 MG capsule    losartan 50 MG tablet    NEEDLES, ANY SIZE    Syringe/Cannula 3 ML [facility]                Primary Care Provider Office Phone # Fax #    Judy Mary Alice Jiménez [phone] [address] [address]        Equal Access to Services     CAIN LOPEZ : Hadii raquel bowen hadasho Soomaali, waaxda luqadaha, qaybta kaalmada adecarylyada, froilan hung. So [facility] [phone].    ATENCIÓN: Si habla español, tiene a kate disposición servicios gratuitos de asistencia lingüística. [facility] [phone].    We comply with applicable federal civil rights laws and [place] laws. We do not discriminate on the basis of race, color, national origin, age, disability, sex, sexual orientation, or gender identity.            Thank you!     Thank you for choosing [facility]  for your care. Our goal is always to provide you with excellent care. Hearing back from our patients is one way we can continue to improve our services. Please take a few minutes to complete the written survey that you may receive in the mail after your visit with us. Thank you!             Your Updated Medication List - Protect others around you: Learn how to safely use, store and throw away your medicines at www.disposemymeds.org.          This list is accurate as of 7/17/18  3:45 PM.  Always use your most recent med list.                   Brand Name Dispense Instructions for use Diagnosis    CALTRATE 600 + D 600-200 MG-IU Tabs      2 TABLET DAILY        cyanocobalamin 1000 MCG/ML injection    VITAMIN B12    3 mL     Inject 1 mL (1,000 mcg) into the muscle every 30 days    Vitamin B12 deficiency (non anemic)       Fish Oil Oil      1 capsule twice daily - unknown dose        hydrochlorothiazide 12.5 MG capsule    MICROZIDE    90 capsule    Take 1 capsule (12.5 mg) by mouth every morning    Essential hypertension with goal blood pressure less than 140/90       labetalol 100 MG tablet    NORMODYNE    180 tablet    TAKE THREE TABLETS BY MOUTH TWO TIMES A DAY    Essential hypertension with goal blood pressure less than 140/90       loratadine 10 MG tablet    CLARITIN    30 tablet    Take 1 tablet (10 mg) by mouth daily    Dysfunction of right eustachian tube       losartan 50 MG tablet    COZAAR    90 tablet    Take 1 tablet (50 mg) by mouth daily    Essential hypertension with goal blood pressure less than 140/90       MULTIVITAMIN TABS   OR      1 TABLET DAILY        NEEDLES, ANY SIZE     1 Box    Use once a month for B12 injection    Vitamin B12 deficiency (non anemic)       Syringe/Cannula 3 ML Misc     12 each    3 ml syringe    Vitamin B12 deficiency (non anemic)       valsartan 160 MG tablet    DIOVAN    30 tablet    TAKE ONE TABLET BY MOUTH ONE TIME DAILY    Essential hypertension with goal blood pressure less than 140/90

## 2018-07-23 ENCOUNTER — HOSPITAL ENCOUNTER (OUTPATIENT)
Dept: MAMMOGRAPHY | Facility: CLINIC | Age: 63
Discharge: HOME | End: 2018-07-23
Attending: NURSE PRACTITIONER | Admitting: NURSE PRACTITIONER
Payer: COMMERCIAL

## 2018-07-23 DIAGNOSIS — Z12.31 VISIT FOR SCREENING MAMMOGRAM: ICD-10-CM

## 2018-07-23 PROCEDURE — 77063 BREAST TOMOSYNTHESIS BI: CPT

## 2018-09-04 ENCOUNTER — TELEPHONE (OUTPATIENT)
Dept: NURSING | Facility: CLINIC | Age: 63
End: 2018-09-04

## 2018-09-04 NOTE — TELEPHONE ENCOUNTER
Aretha on hydrochlorothiazide (Take 1 capsule (12.5 mg) by mouth every morning).  Got letter from [facility] about the recall.  Aretha wondering what she should now start taking ?  Advised to contact pharmacy as per [facility] recommendations related to this, but Aretha states pharmacist cannot change her treatment.  Can you please f/u with Aretha on what she should do?  Okay to leave detailed msg on home phone per her request.      Thanks  Fanta Laboy RN  FNA

## 2018-09-05 NOTE — TELEPHONE ENCOUNTER
Rn spoke with pharmacy.  The HCTZ brand she was dispensed was not from the affected lot (only one lot affected).    Per pharmacy her hydrochlorothiazide does not need to be switched.    Left message for patient to return call to clinic.  Gita BLACK RN

## 2018-09-06 NOTE — TELEPHONE ENCOUNTER
Patient was notified by phone. Patient verbalizes understanding and agreement.     Arielle AMADO RN

## 2019-05-13 ENCOUNTER — TELEPHONE (OUTPATIENT)
Dept: FAMILY MEDICINE | Facility: CLINIC | Age: 64
End: 2019-05-13

## 2019-05-13 NOTE — TELEPHONE ENCOUNTER
Reason for call:  Patient reporting a symptom    Symptom or request: fever, chills, headache, back ache, ache all over, cough  Duration (how long have symptoms been present): since 1800 last nathaniel 5/12/19    Have you been treated for this before? No    Additional comments: pt is asking if she has the flu what else she can do and if she should be seen    Phone Number patient can be reached at:  Home number on file [phone] (home)    Best Time:  any    Can we leave a detailed message on this number:  YES    Call taken on 5/13/2019 at 1:19 PM by Olena Valle

## 2019-05-13 NOTE — TELEPHONE ENCOUNTER
Covering for PCP:  If she would be interested in starting Tamiflu if flu testing is positive, would recommend being seen for testing (within 72 hours of onset of symptoms).  This can shorten duration of symptoms by a day or so.      Anthony Hoskins MD

## 2019-05-13 NOTE — TELEPHONE ENCOUNTER
Telephone Triage Protocols For Nurses by Olena Crump fourth addition was used. She does not have a thermometer. Advised her on ibuprofen use for headache. She is drinking fluids. We discussed reasons to be seen. Should she start a antiviral?  Pili Doyle RN

## 2019-05-14 ENCOUNTER — OFFICE VISIT (OUTPATIENT)
Dept: FAMILY MEDICINE | Facility: CLINIC | Age: 64
End: 2019-05-14
Payer: COMMERCIAL

## 2019-05-14 VITALS
SYSTOLIC BLOOD PRESSURE: 102 MMHG | RESPIRATION RATE: 16 BRPM | TEMPERATURE: 98.3 F | DIASTOLIC BLOOD PRESSURE: 63 MMHG | OXYGEN SATURATION: 95 % | HEART RATE: 64 BPM

## 2019-05-14 DIAGNOSIS — Z13.6 CARDIOVASCULAR SCREENING; LDL GOAL LESS THAN 130: ICD-10-CM

## 2019-05-14 DIAGNOSIS — R68.89 INFLUENZA-LIKE SYMPTOMS: Primary | ICD-10-CM

## 2019-05-14 DIAGNOSIS — J10.1 INFLUENZA DUE TO INFLUENZA VIRUS, TYPE A, HUMAN: ICD-10-CM

## 2019-05-14 DIAGNOSIS — I10 ESSENTIAL HYPERTENSION WITH GOAL BLOOD PRESSURE LESS THAN 140/90: ICD-10-CM

## 2019-05-14 LAB
ANION GAP SERPL CALCULATED.3IONS-SCNC: 4 MMOL/L (ref 3–14)
BUN SERPL-MCNC: 13 MG/DL (ref 7–30)
CALCIUM SERPL-MCNC: 9 MG/DL (ref 8.5–10.1)
CHLORIDE SERPL-SCNC: 107 MMOL/L (ref 94–109)
CHOLEST SERPL-MCNC: 102 MG/DL
CO2 SERPL-SCNC: 28 MMOL/L (ref 20–32)
CREAT SERPL-MCNC: 0.98 MG/DL (ref 0.52–1.04)
FLUAV+FLUBV AG SPEC QL: NEGATIVE
FLUAV+FLUBV AG SPEC QL: POSITIVE
GFR SERPL CREATININE-BSD FRML MDRD: 62 ML/MIN/{1.73_M2}
GLUCOSE SERPL-MCNC: 99 MG/DL (ref 70–99)
HDLC SERPL-MCNC: 52 MG/DL
LDLC SERPL CALC-MCNC: 24 MG/DL
NONHDLC SERPL-MCNC: 50 MG/DL
POTASSIUM SERPL-SCNC: 3.2 MMOL/L (ref 3.4–5.3)
SODIUM SERPL-SCNC: 139 MMOL/L (ref 133–144)
SPECIMEN SOURCE: ABNORMAL
TRIGL SERPL-MCNC: 129 MG/DL

## 2019-05-14 PROCEDURE — 80061 LIPID PANEL: CPT | Performed by: NURSE PRACTITIONER

## 2019-05-14 PROCEDURE — 80048 BASIC METABOLIC PNL TOTAL CA: CPT | Performed by: NURSE PRACTITIONER

## 2019-05-14 PROCEDURE — 99213 OFFICE O/P EST LOW 20 MIN: CPT | Performed by: NURSE PRACTITIONER

## 2019-05-14 PROCEDURE — 36415 COLL VENOUS BLD VENIPUNCTURE: CPT | Performed by: NURSE PRACTITIONER

## 2019-05-14 PROCEDURE — 87804 INFLUENZA ASSAY W/OPTIC: CPT | Performed by: NURSE PRACTITIONER

## 2019-05-14 RX ORDER — LABETALOL 100 MG/1
TABLET, FILM COATED ORAL
Qty: 180 TABLET | Refills: 11 | Status: SHIPPED | OUTPATIENT
Start: 2019-05-14 | End: 2020-05-12

## 2019-05-14 RX ORDER — LOSARTAN POTASSIUM 50 MG/1
50 TABLET ORAL DAILY
Qty: 90 TABLET | Refills: 3 | Status: SHIPPED | OUTPATIENT
Start: 2019-05-14 | End: 2020-05-12

## 2019-05-14 RX ORDER — OSELTAMIVIR PHOSPHATE 75 MG/1
75 CAPSULE ORAL 2 TIMES DAILY
Qty: 10 CAPSULE | Refills: 0 | Status: SHIPPED | OUTPATIENT
Start: 2019-05-14 | End: 2019-05-19

## 2019-05-14 RX ORDER — HYDROCHLOROTHIAZIDE 12.5 MG/1
12.5 CAPSULE ORAL EVERY MORNING
Qty: 90 CAPSULE | Refills: 3 | Status: SHIPPED | OUTPATIENT
Start: 2019-05-14 | End: 2020-05-12

## 2019-05-14 ASSESSMENT — PAIN SCALES - GENERAL: PAINLEVEL: MODERATE PAIN (4)

## 2019-05-14 NOTE — NURSING NOTE
"Chief Complaint   Patient presents with     Flu     flu like symptoms started sunday 5/12 -productive cough, headaches, body aches, fatigue, low grade fevers, nasal congestion/drainage and bout of diarrhea this AM. She is treating with tylenol. No known exposures.        Initial /63   Pulse 64   Temp 98.3  F (36.8  C) (Tympanic)   Resp 16   LMP 04/01/2008   SpO2 95%  Estimated body mass index is 29.57 kg/m  as calculated from the following:    Height as of 7/17/18: 1.638 m (5' 4.5\").    Weight as of 11/10/17: 79.4 kg (175 lb).    Medication Reconciliation: complete    Lori Kline MA  "

## 2019-05-14 NOTE — PATIENT INSTRUCTIONS
Results for orders placed or performed in visit on 05/14/19   Influenza A/B antigen   Result Value Ref Range    Influenza A/B Agn Specimen Nasal     Influenza A Positive (A) NEG^Negative    Influenza B Negative NEG^Negative     Patient Education   Influenza (Adult)    Influenza is also called the flu. It is a viral illness that affects the air passages of your lungs. It is different from the common cold. The flu can easily be passed from one to person to another. It may be spread through the air by coughing and sneezing. Or it can be spread by touching the sick person and then touching your own eyes, nose, or mouth.  The flu starts 1 to 3 days after you are exposed to the flu virus. It may last for 1 to 2 weeks but many people feel tired or fatigued for many weeks afterward. You usually don t need to take antibiotics unless you have a complication. This might be an ear or sinus infection or pneumonia.  Symptoms of the flu may be mild or severe. They can include extreme tiredness (wanting to stay in bed all day), chills, fevers, muscle aches, soreness with eye movement, headache, and a dry, hacking cough.  Home care  Follow these guidelines when caring for yourself at home:    Avoid being around cigarette smoke, whether yours or other people s.    Acetaminophen or ibuprofen will help ease your fever, muscle aches, and headache. Don t give aspirin to anyone younger than 18 who has the flu. Aspirin can harm the liver.    Nausea and loss of appetite are common with the flu. Eat light meals. Drink 6 to 8 glasses of liquids every day. Good choices are water, sport drinks, soft drinks without caffeine, juices, tea, and soup. Extra fluids will also help loosen secretions in your nose and lungs.    Over-the-counter cold medicines will not make the flu go away faster. But the medicines may help with coughing, sore throat, and congestion in your nose and sinuses. Don t use a decongestant if you have high blood  pressure.    Stay home until your fever has been gone for at least 24 hours without using medicine to reduce fever.  Follow-up care  Follow up with your healthcare provider, or as advised, if you are not getting better over the next week.  If you are age 65 or older, talk with your provider about getting a pneumococcal vaccine every 5 years. You should also get this vaccine if you have chronic asthma or COPD. All adults should get a flu vaccine every fall. Ask your provider about this.  When to seek medical advice  Call your healthcare provider right away if any of these occur:    Cough with lots of colored mucus (sputum) or blood in your mucus    Chest pain, shortness of breath, wheezing, or trouble breathing    Severe headache, or face, neck, or ear pain    New rash with fever    Fever of 100.4 F (38 C) or higher, or as directed by your healthcare provider    Confusion, behavior change, or seizure    Severe weakness or dizziness    You get a new fever or cough after getting better for a few days  Date Last Reviewed: 1/1/2017 2000-2018 [employer]. [address] [place] [address], [place] [address]. All rights reserved. This information is not intended as a substitute for professional medical care. Always follow your healthcare professional's instructions.

## 2019-05-14 NOTE — PROGRESS NOTES
SUBJECTIVE:   Aretha Hooks is a 63 year old female who presents to clinic today for the following   health issues:    Chief Complaint   Patient presents with     Flu     flu like symptoms started sunday 5/12 -productive cough, headaches, body aches, fatigue, low grade fevers, nasal congestion/drainage and bout of diarrhea this AM. She is treating with tylenol. No known exposures.      RESPIRATORY SYMPTOMS      Duration: since Sunday evening    Description  nasal congestion, cough, fever, headache and fatigue/malaise    Severity: moderate    Accompanying signs and symptoms: None    History (predisposing factors):  none    Precipitating or alleviating factors: None    Therapies tried and outcome:  rest and fluids    No shortness of breath, chest pain, vomiting.  Appetite decreased but taking in fluids.    Hypertension Follow-up      Outpatient blood pressures are not being checked.    Low Salt Diet: no added salt             Additional history: as documented    Reviewed  and updated as needed this visit by clinical staff  Tobacco  Meds  Med Hx  Surg Hx  Fam Hx  Soc Hx      Reviewed and updated as needed this visit by Provider       Patient Active Problem List   Diagnosis     Essential hypertension     Other specified disorder of rectum and anus     Tear meniscus knee     Hypertriglyceridemia     Vitamin B12 deficiency (non anemic)     CARDIOVASCULAR SCREENING; LDL GOAL LESS THAN 130     Tubular adenoma of colon     Brain aneurysm     Sensory disorder     ETD (Eustachian tube dysfunction), right     Past Surgical History:   Procedure Laterality Date     C APPENDECTOMY  Age 12    Appendectomy     C LIGATE FALLOPIAN TUBE  1984    Tubal Ligation     COLONOSCOPY N/A 11/10/2017    Procedure: COMBINED COLONOSCOPY, SINGLE OR MULTIPLE BIOPSY/POLYPECTOMY BY BIOPSY;  colonoscopy;  Surgeon: Emil Vaz MD;  Location: [facility]     HC KNEE SCOPE,MED+LAT MENIS REPAIR  2009    left knee     SURGICAL HISTORY OF -    2004    Cervical dilatation with curettage and hysteroscopy with rollerball endometrial ablation     SURGICAL HISTORY OF -   1/2004    Laparoscopic Cholecystectomy     SURGICAL HISTORY OF -   1988    Cryocautery to cervix for bleeding     SURGICAL HISTORY OF -   2004    endometrial ablation     SURGICAL HISTORY OF -   1/2008    carcinoid tumor rectum  Ruthie Lawson       Social History     Tobacco Use     Smoking status: Passive Smoke Exposure - Never Smoker     Smokeless tobacco: Never Used     Tobacco comment: exposed to 's cigarette smoke   Substance Use Topics     Alcohol use: Yes     Comment: occ     Family History   Problem Relation Age of Onset     Cancer Mother         Renal     Hypertension Father      Respiratory Father         uses cpap     Eye Disorder Father         cataracts     Hypertension Brother      Musculoskeletal Disorder Sister         back surg.     Genitourinary Problems Sister      Cancer Sister         skin cancer     Gynecology Sister         hysterectomy     C.A.D. Paternal Grandmother      Breast Cancer No family hx of      Cancer - colorectal No family hx of      Colon Cancer No family hx of          Current Outpatient Medications   Medication Sig Dispense Refill     CALTRATE 600 + D 600-200 MG-IU OR TABS 2 TABLET DAILY       cyanocobalamin (VITAMIN B12) 1000 MCG/ML injection Inject 1 mL (1,000 mcg) into the muscle every 30 days 3 mL 9     Fish Oil OIL 1 capsule twice daily - unknown dose       hydrochlorothiazide (MICROZIDE) 12.5 MG capsule Take 1 capsule (12.5 mg) by mouth every morning 90 capsule 3     labetalol (NORMODYNE) 100 MG tablet TAKE THREE TABLETS BY MOUTH TWO TIMES A  tablet 11     loratadine (CLARITIN) 10 MG tablet Take 1 tablet (10 mg) by mouth daily 30 tablet 1     losartan (COZAAR) 50 MG tablet Take 1 tablet (50 mg) by mouth daily 90 tablet 3     MULTIVITAMIN TABS   OR 1 TABLET DAILY       NEEDLES, ANY SIZE Use once a month for B12 injection  1 Box 1     oseltamivir (TAMIFLU) 75 MG capsule Take 1 capsule (75 mg) by mouth 2 times daily for 5 days 10 capsule 0     Syringe, Disposable, (SYRINGE/CANNULA) 3 ML MISC 3 ml syringe 12 each 1     No Known Allergies  Recent Labs   Lab Test 06/26/17  1711 11/18/15  1531  12/24/12  0816 09/27/12  0906   A1C  --   --   --  5.1  --    LDL  --  81  --   --  94   HDL  --  55  --   --  59   TRIG  --   --   --   --  113   CR 0.88 0.82   < >  --   --    GFRESTIMATED 66 72   < >  --   --    GFRESTBLACK 80 87   < >  --   --    POTASSIUM 3.5 3.5   < >  --   --     < > = values in this interval not displayed.      BP Readings from Last 3 Encounters:   05/14/19 102/63   07/17/18 108/82   04/10/18 129/85    Wt Readings from Last 3 Encounters:   11/10/17 79.4 kg (175 lb)   04/01/13 80.7 kg (178 lb)   03/27/12 80.7 kg (178 lb)                  Labs reviewed in EPIC    ROS:  Constitutional, HEENT, cardiovascular, pulmonary, GI, , musculoskeletal, neuro, skin, endocrine and psych systems are negative, except as otherwise noted.    OBJECTIVE:     /63   Pulse 64   Temp 98.3  F (36.8  C) (Tympanic)   Resp 16   LMP 04/01/2008   SpO2 95%   There is no height or weight on file to calculate BMI.  GENERAL: alert, no distress and ill appearing but not toxic  HENT: normal cephalic/atraumatic, ear canals and TM's normal, nose and mouth without ulcers or lesions, oropharynx clear and oral mucous membranes moist  NECK: no adenopathy, no asymmetry, masses, or scars and thyroid normal to palpation  RESP: lungs clear to auscultation - no rales, rhonchi or wheezes  CV: regular rate and rhythm, normal S1 S2, no S3 or S4, no murmur, click or rub, no peripheral edema and peripheral pulses strong  ABDOMEN: soft, nontender, no hepatosplenomegaly, no masses and bowel sounds normal  MS: no gross musculoskeletal defects noted, no edema    Diagnostic Test Results:  Results for orders placed or performed in visit on 05/14/19 (from the past 24  hour(s))   Influenza A/B antigen   Result Value Ref Range    Influenza A/B Agn Specimen Nasal     Influenza A Positive (A) NEG^Negative    Influenza B Negative NEG^Negative       ASSESSMENT/PLAN:     1. Influenza-like symptoms    - Influenza A/B antigen    2. Essential hypertension with goal blood pressure less than 140/90  Controlled - refilled medications and labs ordered.    - hydrochlorothiazide (MICROZIDE) 12.5 MG capsule; Take 1 capsule (12.5 mg) by mouth every morning  Dispense: 90 capsule; Refill: 3  - labetalol (NORMODYNE) 100 MG tablet; TAKE THREE TABLETS BY MOUTH TWO TIMES A DAY  Dispense: 180 tablet; Refill: 11  - losartan (COZAAR) 50 MG tablet; Take 1 tablet (50 mg) by mouth daily  Dispense: 90 tablet; Refill: 3  - Basic metabolic panel; Future  - Basic metabolic panel    3. CARDIOVASCULAR SCREENING; LDL GOAL LESS THAN 130     - Lipid panel reflex to direct LDL Fasting; Future  - Lipid panel reflex to direct LDL Fasting    4. Influenza due to influenza virus, type A, human  The risks, benefits and treatment options of prescribed medications or other treatments have been discussed with the patient. The patient verbalized their understanding and should call or follow up if no improvement or if they develop further problems.    - oseltamivir (TAMIFLU) 75 MG capsule; Take 1 capsule (75 mg) by mouth 2 times daily for 5 days  Dispense: 10 capsule; Refill: 0    Patient Instructions           Results for orders placed or performed in visit on 05/14/19   Influenza A/B antigen   Result Value Ref Range    Influenza A/B Agn Specimen Nasal     Influenza A Positive (A) NEG^Negative    Influenza B Negative NEG^Negative     Patient Education   Influenza (Adult)    Influenza is also called the flu. It is a viral illness that affects the air passages of your lungs. It is different from the common cold. The flu can easily be passed from one to person to another. It may be spread through the air by coughing and sneezing.  Or it can be spread by touching the sick person and then touching your own eyes, nose, or mouth.  The flu starts 1 to 3 days after you are exposed to the flu virus. It may last for 1 to 2 weeks but many people feel tired or fatigued for many weeks afterward. You usually don t need to take antibiotics unless you have a complication. This might be an ear or sinus infection or pneumonia.  Symptoms of the flu may be mild or severe. They can include extreme tiredness (wanting to stay in bed all day), chills, fevers, muscle aches, soreness with eye movement, headache, and a dry, hacking cough.  Home care  Follow these guidelines when caring for yourself at home:    Avoid being around cigarette smoke, whether yours or other people s.    Acetaminophen or ibuprofen will help ease your fever, muscle aches, and headache. Don t give aspirin to anyone younger than 18 who has the flu. Aspirin can harm the liver.    Nausea and loss of appetite are common with the flu. Eat light meals. Drink 6 to 8 glasses of liquids every day. Good choices are water, sport drinks, soft drinks without caffeine, juices, tea, and soup. Extra fluids will also help loosen secretions in your nose and lungs.    Over-the-counter cold medicines will not make the flu go away faster. But the medicines may help with coughing, sore throat, and congestion in your nose and sinuses. Don t use a decongestant if you have high blood pressure.    Stay home until your fever has been gone for at least 24 hours without using medicine to reduce fever.  Follow-up care  Follow up with your healthcare provider, or as advised, if you are not getting better over the next week.  If you are age 65 or older, talk with your provider about getting a pneumococcal vaccine every 5 years. You should also get this vaccine if you have chronic asthma or COPD. All adults should get a flu vaccine every fall. Ask your provider about this.  When to seek medical advice  Call your healthcare  provider right away if any of these occur:    Cough with lots of colored mucus (sputum) or blood in your mucus    Chest pain, shortness of breath, wheezing, or trouble breathing    Severe headache, or face, neck, or ear pain    New rash with fever    Fever of 100.4 F (38 C) or higher, or as directed by your healthcare provider    Confusion, behavior change, or seizure    Severe weakness or dizziness    You get a new fever or cough after getting better for a few days  Date Last Reviewed: 1/1/2017 2000-2018 [employer]. [address] [place], [place] [address]. All rights reserved. This information is not intended as a substitute for professional medical care. Always follow your healthcare professional's instructions.               Judy Jiménez NP  [facility]

## 2019-05-15 DIAGNOSIS — E87.6 HYPOKALEMIA: Primary | ICD-10-CM

## 2019-06-08 DIAGNOSIS — I10 ESSENTIAL HYPERTENSION WITH GOAL BLOOD PRESSURE LESS THAN 140/90: ICD-10-CM

## 2019-06-08 LAB
CREAT SERPL-MCNC: 0.73 MG/DL (ref 0.52–1.04)
GFR SERPL CREATININE-BSD FRML MDRD: 88 ML/MIN/{1.73_M2}
POTASSIUM SERPL-SCNC: 3.6 MMOL/L (ref 3.4–5.3)

## 2019-06-08 PROCEDURE — 36415 COLL VENOUS BLD VENIPUNCTURE: CPT | Performed by: NURSE PRACTITIONER

## 2019-06-08 PROCEDURE — 84132 ASSAY OF SERUM POTASSIUM: CPT | Performed by: NURSE PRACTITIONER

## 2019-06-08 PROCEDURE — 82565 ASSAY OF CREATININE: CPT | Performed by: NURSE PRACTITIONER

## 2019-07-17 DIAGNOSIS — E53.8 VITAMIN B12 DEFICIENCY (NON ANEMIC): ICD-10-CM

## 2019-07-17 NOTE — TELEPHONE ENCOUNTER
"Requested Prescriptions   Pending Prescriptions Disp Refills     cyanocobalamin (CYANOCOBALAMIN) 1000 MCG/ML injection [Pharmacy Med Name: Cyanocobalamin Injection Solution 1000 MCG/ML] 1 mL 8     Sig: Inject 1 mL (1,000 mcg) into the muscle every 30 days       Vitamin Supplements (Adult) Protocol Passed - 7/17/2019  7:01 AM        Passed - High dose Vitamin D not ordered        Passed - Recent (12 mo) or future (30 days) visit within the authorizing provider's specialty     Patient had office visit in the last 12 months or has a visit in the next 30 days with authorizing provider or within the authorizing provider's specialty.  See \"Patient Info\" tab in inbasket, or \"Choose Columns\" in Meds & Orders section of the refill encounter.              Passed - Medication is active on med list        Last Written Prescription Date:  7/17/18  Last Fill Quantity: 3,  # refills: 9   Last office visit: 5/14/2019 with prescribing provider:  Tino   Future Office Visit:   Next 5 appointments (look out 90 days)    Aug 27, 2019  3:00 PM CDT  PHYSICAL with Judy Jiménez NP  [facility] ([facility]) [address] [address] [phone]           "

## 2019-07-18 RX ORDER — CYANOCOBALAMIN 1000 UG/ML
1 INJECTION, SOLUTION INTRAMUSCULAR; SUBCUTANEOUS
Qty: 1 ML | Refills: 8 | Status: SHIPPED | OUTPATIENT
Start: 2019-07-18 | End: 2020-04-17

## 2019-07-18 NOTE — TELEPHONE ENCOUNTER
Routing refill request to provider for review/approval because:  Drug not on the [facility] refill protocol - for injection    Gita BLACK RN

## 2019-07-26 ENCOUNTER — OFFICE VISIT (OUTPATIENT)
Dept: FAMILY MEDICINE | Facility: CLINIC | Age: 64
End: 2019-07-26
Payer: COMMERCIAL

## 2019-07-26 VITALS
SYSTOLIC BLOOD PRESSURE: 118 MMHG | OXYGEN SATURATION: 98 % | WEIGHT: 175 LBS | DIASTOLIC BLOOD PRESSURE: 72 MMHG | HEART RATE: 66 BPM | TEMPERATURE: 98.5 F | RESPIRATION RATE: 18 BRPM | HEIGHT: 66 IN | BODY MASS INDEX: 28.12 KG/M2
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
## 2019-07-26 PROCEDURE — 36415 COLL VENOUS BLD VENIPUNCTURE: CPT | Performed by: NURSE PRACTITIONER

## 2019-07-26 PROCEDURE — 99213 OFFICE O/P EST LOW 20 MIN: CPT | Performed by: NURSE PRACTITIONER

## 2019-07-26 PROCEDURE — 86618 LYME DISEASE ANTIBODY: CPT | Performed by: NURSE PRACTITIONER

## 2019-07-26 RX ORDER — DOXYCYCLINE 100 MG/1
200 CAPSULE ORAL ONCE
Qty: 2 CAPSULE | Refills: 0 | Status: SHIPPED | OUTPATIENT
Start: 2019-07-26 | End: 2019-07-26

## 2019-07-26 ASSESSMENT — PAIN SCALES - GENERAL: PAINLEVEL: NO PAIN (0)

## 2019-07-26 ASSESSMENT — MIFFLIN-ST. JEOR: SCORE: 1367.6

## 2019-07-26 NOTE — PATIENT INSTRUCTIONS
Patient Education     Preventing Lyme Disease  Ticks are small spider-like arachnids that feed on the blood of animals and humans. They can carry the bacteria that cause Lyme disease. The bacteria can pass to a person from a tick bite. (It is not passed from person to person.) Lyme disease can lead to serious health problems if it is not treated with antibiotics. The best way to prevent Lyme disease is to avoid being bitten by a tick.     The tick that carries Lyme disease is very small--about the size of a poppy seed.   Preventing tick bites  The disease is carried by the blacklegged tick, also called a  deer tick.  Young ticks called nymphs are the most likely to carry the bacteria. They are very small--about the size of a poppy seed. They can be found on deer, rodents, and birds. Often the animal brushes the tick onto leaves or other plants as it runs through the woods and then the tick lives in bushes, grasses, and dead leaves. The most active time of year for infected ticks varies by region of the country. In the East and [place], they are more active from April to September. In the West, they may be more active from December to February. But you can still be bitten at other times of the year. Below are tips for protecting yourself from tick bites.  Protect your body    Wear clothes that protect you. Clothing can help protect you from tick bites. Wear long pants and long sleeves in outdoor areas where ticks may live. Tuck your shirt into your pants. Tuck pant legs into your socks. And wear light colors so you can more easily see ticks on your clothes.    Use insect repellent. Spray insect repellent containing at least 20 percent DEET on your exposed skin. You can also use it on clothing, shoes, and camping gear. Avoid getting DEET on children s hands, mouth, or eyes. You can use products with permethrin on clothing, shoes, and camping gear. But do not spray permethrin on skin. It will cause a rash. Follow the  directions on the package of the spray you use. For more information on bug sprays, visit the National Pesticide Information Center at http://npic.[facility].edu.    Avoid tick-infested areas. Avoid brushing against grasses, bushes, and other plants. Avoid walking through dead leaves and other ground vegetation. Do not sit on fallen logs. And avoid areas with large numbers of deer and rodents.    Check yourself for ticks. After being outdoors, check your clothes and skin for ticks. Keep in mind that the ticks are about the size of a poppy seed. Use both a hand-held and a full-length mirror to view all of your skin. Pay special attention to areas with hair. Make sure to thoroughly check these areas:  ? Scalp  ? Behind the ears  ? Armpits  ? Belly button  ? Waist  ? Groin  ? Backs of the knees    Use the clothes dryer. Putting clothing or bedding into a clothes dryer for 1 hour at high heat has been shown to kill ticks.  Control ticks around your home    Create tick-free safety zones. Ticks that transmit Lyme disease live in ground vegetation. Ticks crawl onto people from shrubs and grasses in and around wooded areas. Cut bushes and other plants away from the deck or patio and any child play areas. Keep all grasses cut short. Remove dead leaves and other dead vegetation. Do not put children s play equipment near wooded areas. Put wood chips or gravel on the ground between lawns and wooded areas.    Use pesticides. You can apply them yourself or hire a pest control expert. States have different regulations about pesticides. Ask your local health department for information.    Keep deer away. Deer often carry the ticks that can infect you with Lyme disease. Do not attempt to pet or feed deer. Ask your local garden center about deer-resistant plants. Ask your local health department about deer control in your area.    Prevent ticks on pets. Pets can bring ticks into your home. Use tick control medicine as advised by your  . Check your pet for ticks after it comes indoors. Pay special attention to the ears.    Ask about local tick-control methods. Some states have tick-control programs. Local health departments may be using methods that can help you control ticks at home.  If you have a tick  If you find a tick on your skin, do not panic. Most ticks don't carry Lyme disease. And the tick must be attached for 36 to 48 hours before it might infect you. If you find a tick on you, here s what to do:    If the tick is not yet attached to your skin, remove the tick with tweezers or a tissue. Flush it down the toilet. If you see a tick on your clothes, use a piece of tape to lift it off. Do not touch it with your bare hands.    If the tick is attached to your skin:  ? Carefully remove the tick with tweezers. If you don t have tweezers, use your fingers protected by a paper towel or a thin cloth. Grasp the tick as close to your skin as possible. Pull slowly and gently to remove the tick. The tick may not let go right away. Do not pull harder. Be patient and keep trying gently. Do not twist the head or body as you pull. Do not crush or squeeze the body. This can release the bacteria into your body. Never use a hot match, petroleum jelly, or other products to remove a tick. (If you can t remove the tick or if part of the tick remains in the skin, call your healthcare provider right away.)  ? Wash your skin with soap and water after you remove the tick. This will help ensure you remove any bacteria.  ? If you can, save the tick in a tightly sealed glass or plastic container. Take it to your healthcare provider. He or she may be able to have someone identify if it is the type of tick that transmits Lyme.  ? Call your healthcare provider and describe the bite and the tick. You may be asked to come in for an exam. You may be tested for Lyme. You may also be prescribed antibiotics to help prevent infection.  ? Over the next month, watch  "for the symptoms below, especially a rash at the site of the bite.  Symptoms of Lyme disease  Call your healthcare provider if you develop any symptoms of Lyme disease, even if you don t remember being bitten. These include:    A round, red rash (called a bull s-eye rash)    Fever and chills    Tiredness or body aches    Headache or a stiff neck    Joint pain and swelling (arthritis), especially in large joints such as the knees   To learn more    Centers for Disease Control and Prevention: www.cdc.gov/lyme    American Lyme Disease Foundation: www.aldf.com  Date Last Reviewed: 11/1/2016 2000-2018 [employer]. [address] [place] [address]. All rights reserved. This information is not intended as a substitute for professional medical care. Always follow your healthcare professional's instructions.           Patient Education     Tick Bites  Ticks are small arachnids that feed on the blood of rodents, rabbits, birds, deer, dogs, and people. A tick bite may cause a reaction like a spider bite. You may have redness, itching, and slight swelling at the site. Sometimes you may have no reaction where the tick bit you.  Ticks may gorge themselves for days before you find and remove them. The bites themselves aren't cause for concern. But ticks can carry and pass on illnesses such as Lyme disease and [place] spotted fever. Both diseases begin with a rash and symptoms similar to the flu. In advanced stages, these diseases can be quite serious.     A \"bull's eye\" rash is a common symptom of Lyme disease.   When to go to the emergency room (ER)  Not all ticks carry disease. And a tick must stay attached for at least 24 hours to infect you. If you find a tick, don't panic. Try to carefully remove it with tweezers. Grasp the tick near its head and pull without twisting. If you can't easily dislodge the tick or if you leave the head in your skin, get medical care right away.  What to expect in " the ER    The tick or any parts of the tick will be removed and the bite will be cleaned.    To prevent disease, you may be given antibiotics. Both Lyme disease and [place] spotted fever respond quickly to these medicines.    You may be asked to see your healthcare provider for a blood test to check for Lyme disease.  Follow-up care  Some states and [facility] have services that test ticks for Lyme disease and other diseases. Check with your local officials to see if this service is available in your area.  If you remove a tick yourself, watch for signs of a tick-borne illness. Symptoms may show up within a few days or weeks after a bite. Call your healthcare provider if you notice any of the following:    Rash. The rash may spread outward in a ring from a hard white lump. Or it may move up your arms and legs to your chest.    Chills and fever    Body aches and joint pain    Severe headache  Date Last Reviewed: 12/1/2016 2000-2018 [employer]. [address] [place] [address]. All rights reserved. This information is not intended as a substitute for professional medical care. Always follow your healthcare professional's instructions.         Doxycycline 2 capsule as single dose for prophylactic treatment if tick get attached for over 24 hrs, take it within 72 hrs after tick bite. Use sun screen after take Doxycycline, or avoid sun exposure since it can cause sun reaction sensitivity

## 2019-07-26 NOTE — PROGRESS NOTES
"Subjective     Aretha Hoosk is a 63 year old female who presents to clinic today for the following health issues: had tick bite and states it was attached for few days, this was in May, complains of increased fatigue and pain in her bilateral knees, denies joint swelling, fevers, chills, headaches, dizziness, blurry vision. Denies EM, states she had red cercle where tick was attached, but the area of erythema was not larger than half an inch. [place], she has concerns about possible future tick bites and wondering what she should do.     Chief Complaint   Patient presents with     Insect Bites     She pulled two ticks off herself the first week in June. One tick attached right lower leg and on right hip. Since then she has noticed worsening fatigue and hot flashes. No visual changes now but she did notice a bullseye rash on her lower leg looked infected (per patient)      Reviewed and updated as needed this visit by Provider  Allergies         Review of Systems   ROS COMP: Constitutional, HEENT, cardiovascular, pulmonary, gi and gu systems are negative, except as otherwise noted.      Objective    /72   Pulse 66   Temp 98.5  F (36.9  C) (Tympanic)   Resp 18   Ht 1.664 m (5' 5.5\")   Wt 79.4 kg (175 lb)   LMP 04/01/2008   SpO2 98%   BMI 28.68 kg/m    Body mass index is 28.68 kg/m .  Physical Exam   GENERAL: healthy, alert and no distress  MS: no gross musculoskeletal defects noted, no edema  SKIN: no suspicious lesions or rashes  NEURO: Normal strength and tone, mentation intact and speech normal  PSYCH: mentation appears normal, affect normal/bright    Diagnostic Test Results:  Labs reviewed in Epic        Assessment & Plan     1. Tick bite, initial encounter    - Lyme Disease Hui with reflex to WB Serum  - doxycycline hyclate (VIBRAMYCIN) 100 MG capsule; Take 2 capsules (200 mg) by mouth once for 1 dose  Dispense: 2 capsule; Refill: 0   -provided with once single dose " for Doxycycline for as needed use, had long conversation about how to prevent Lyme disease, discussed symptoms of Lyme disease.    See Patient Instructions    Return in about 2 weeks (around 8/9/2019), or if symptoms worsen or fail to improve.    YARA Castillo [facility]

## 2019-07-26 NOTE — NURSING NOTE
"Chief Complaint   Patient presents with     Insect Bites     She pulled two ticks off herself the first week in June. One tick attached right lower leg and on right hip. Since then she has noticed worsening fatigue and hot flashes. No visual changes now but she did notice a bullseye rash on her lower leg looked infected (per patient)      Initial /72   Pulse 66   Temp 98.5  F (36.9  C) (Tympanic)   Resp 18   Ht 1.664 m (5' 5.5\")   Wt 79.4 kg (175 lb)   LMP 04/01/2008   SpO2 98%   BMI 28.68 kg/m   Estimated body mass index is 28.68 kg/m  as calculated from the following:    Height as of this encounter: 1.664 m (5' 5.5\").    Weight as of this encounter: 79.4 kg (175 lb).    Medication Reconciliation: complete    Lori Kline MA  "

## 2019-07-28 LAB — B BURGDOR IGG+IGM SER QL: 0.09 (ref 0–0.89)

## 2019-08-27 ENCOUNTER — OFFICE VISIT (OUTPATIENT)
Dept: FAMILY MEDICINE | Facility: CLINIC | Age: 64
End: 2019-08-27
Payer: COMMERCIAL

## 2019-08-27 ENCOUNTER — HOSPITAL ENCOUNTER (OUTPATIENT)
Dept: MAMMOGRAPHY | Facility: CLINIC | Age: 64
Discharge: HOME | End: 2019-08-27
Attending: NURSE PRACTITIONER | Admitting: NURSE PRACTITIONER
Payer: COMMERCIAL

## 2019-08-27 VITALS — HEART RATE: 62 BPM | SYSTOLIC BLOOD PRESSURE: 110 MMHG | DIASTOLIC BLOOD PRESSURE: 62 MMHG | TEMPERATURE: 98.6 F

## 2019-08-27 DIAGNOSIS — Z00.00 ROUTINE GENERAL MEDICAL EXAMINATION AT A HEALTH CARE FACILITY: Primary | ICD-10-CM

## 2019-08-27 DIAGNOSIS — M54.41 CHRONIC MIDLINE LOW BACK PAIN WITH RIGHT-SIDED SCIATICA: ICD-10-CM

## 2019-08-27 DIAGNOSIS — Z12.4 SCREENING FOR CERVICAL CANCER: ICD-10-CM

## 2019-08-27 DIAGNOSIS — Z12.31 VISIT FOR SCREENING MAMMOGRAM: ICD-10-CM

## 2019-08-27 DIAGNOSIS — G89.29 CHRONIC MIDLINE LOW BACK PAIN WITH RIGHT-SIDED SCIATICA: ICD-10-CM

## 2019-08-27 PROCEDURE — G0145 SCR C/V CYTO,THINLAYER,RESCR: HCPCS | Performed by: NURSE PRACTITIONER

## 2019-08-27 PROCEDURE — 77067 SCR MAMMO BI INCL CAD: CPT

## 2019-08-27 PROCEDURE — 87624 HPV HI-RISK TYP POOLED RSLT: CPT | Performed by: NURSE PRACTITIONER

## 2019-08-27 PROCEDURE — 99396 PREV VISIT EST AGE 40-64: CPT | Performed by: NURSE PRACTITIONER

## 2019-08-27 PROCEDURE — 77063 BREAST TOMOSYNTHESIS BI: CPT

## 2019-08-27 PROCEDURE — 99213 OFFICE O/P EST LOW 20 MIN: CPT | Mod: 25 | Performed by: NURSE PRACTITIONER

## 2019-08-27 NOTE — PROGRESS NOTES
SUBJECTIVE:   CC: Aretha Hooks is an 63 year old woman who presents for preventive health visit.     Healthy Habits:    Do you get at least three servings of calcium containing foods daily (dairy, green leafy vegetables, etc.)? no, taking calcium and/or vitamin D supplement: yes - Caltrate    Amount of exercise or daily activities, outside of work: 3 day(s) per week    Problems taking medications regularly No    Medication side effects: No    Have you had an eye exam in the past two years? yes    Do you see a dentist twice per year? yes    Do you have sleep apnea, excessive snoring or daytime drowsiness?no      -------------------------------------    Right thigh numbness- started several years ago now getting worse- feels like it is a larger area on her thigh/numbess and pins and needle feeling also having lower mid back pain with standing for long periods of time. Patient feels like she cant walk as fas as she would like due to the back pain and numbness on thigh. She also feels like she is unable to garden due to her symptoms.     MRI LUMBAR SPINE in 2005:  IMPRESSION:     1.  Multi-level degenerative disk disease from L2 to L5 greatest at   L4-5.       2.  Small central disk protrusion at L4-5 with no central stenosis.    However, lateral bulging of the L4-5 disk annulus and endplate   spurring results in mild to moderate bilateral L4 foraminal stenosis.            3.  Small central disk protrusion at L2-3 with no stenosis or neural   impingement.       4.  Probable transitional segment at the lumbosacral junction.  The   segment labeled L5 for the purposes of this report is likely   partially sacralized    Today's PHQ-2 Score:   PHQ-2 ( 1999 [employer]) 8/27/2019 7/17/2018   Q1: Little interest or pleasure in doing things 0 0   Q2: Feeling down, depressed or hopeless 0 0   PHQ-2 Score 0 0       Abuse: Current or Past(Physical, Sexual or Emotional)- No  Do you feel safe in your environment? Yes    Social  History     Tobacco Use     Smoking status: Passive Smoke Exposure - Never Smoker     Smokeless tobacco: Never Used     Tobacco comment: exposed to 's cigarette smoke   Substance Use Topics     Alcohol use: Yes     Comment: occ     If you drink alcohol do you typically have >3 drinks per day or >7 drinks per week? No                     Reviewed orders with patient.  Reviewed health maintenance and updated orders accordingly - Yes  BP Readings from Last 3 Encounters:   08/27/19 110/62   07/26/19 118/72   05/14/19 102/63    Wt Readings from Last 3 Encounters:   07/26/19 79.4 kg (175 lb)   11/10/17 79.4 kg (175 lb)   04/01/13 80.7 kg (178 lb)                    Mammogram Screening: Patient over age 50, mutual decision to screen reflected in health maintenance.    Pertinent mammograms are reviewed under the imaging tab.  History of abnormal Pap smear:   Last 3 Pap and HPV Results:   PAP / HPV Latest Ref Rng & Units 6/13/2016 4/1/2013 3/27/2012   PAP - NIL NIL NIL   HPV 16 DNA NEG Negative - -   HPV 18 DNA NEG Negative - -   OTHER HR HPV NEG Negative - -     PAP / HPV Latest Ref Rng & Units 6/13/2016 4/1/2013 3/27/2012   PAP - NIL NIL NIL   HPV 16 DNA NEG Negative - -   HPV 18 DNA NEG Negative - -   OTHER HR HPV NEG Negative - -     Reviewed and updated as needed this visit by clinical staff  Meds         Reviewed and updated as needed this visit by Provider            ROS:  CONSTITUTIONAL: NEGATIVE for fever, chills, change in weight  INTEGUMENTARY/SKIN: NEGATIVE for worrisome rashes, moles or lesions  EYES: NEGATIVE for vision changes or irritation  ENT: NEGATIVE for ear, mouth and throat problems  RESP: NEGATIVE for significant cough or SOB  BREAST: NEGATIVE for masses, tenderness or discharge  CV: NEGATIVE for chest pain, palpitations or peripheral edema  GI: NEGATIVE for nausea, abdominal pain, heartburn, or change in bowel habits  : NEGATIVE for unusual urinary or vaginal symptoms. No vaginal  bleeding.  MUSCULOSKELETAL:POSITIVE  for back pain  and radicular pain right thigh  NEURO: NEGATIVE for weakness, dizziness or paresthesias  PSYCHIATRIC: NEGATIVE for changes in mood or affect     OBJECTIVE:   LMP 04/01/2008   EXAM:  GENERAL: healthy, alert and no distress  EYES: Eyes grossly normal to inspection, PERRL and conjunctivae and sclerae normal  HENT: ear canals and TM's normal, nose and mouth without ulcers or lesions  NECK: no adenopathy, no asymmetry, masses, or scars and thyroid normal to palpation  RESP: lungs clear to auscultation - no rales, rhonchi or wheezes  BREAST: normal without masses, tenderness or nipple discharge and no palpable axillary masses or adenopathy  CV: regular rate and rhythm, normal S1 S2, no S3 or S4, no murmur, click or rub, no peripheral edema and peripheral pulses strong  ABDOMEN: soft, nontender, no hepatosplenomegaly, no masses and bowel sounds normal   (female): normal female external genitalia, normal urethral meatus, vaginal mucosa pink, moist, well rugated, and normal cervix/adnexa/uterus without masses or discharge  MS: no gross musculoskeletal defects noted, no edema  SKIN: no suspicious lesions or rashes  NEURO: Normal strength and tone, mentation intact and speech normal  PSYCH: mentation appears normal, affect normal/bright    Diagnostic Test Results:  Labs reviewed in Epic    ASSESSMENT/PLAN:   1. Routine general medical examination at a health care facility      2. Screening for cervical cancer    - Pap imaged thin layer screen with HPV - recommended age 30 - 65 years (select HPV order below)  - HPV High Risk Types DNA Cervical    3. Chronic midline low back pain with right-sided sciatica  Chronic for 14 yrs- previously had MRI lumbar spine- now with worsening symptoms and radiculopathy.   - SPORTS MEDICINE REFERRAL    COUNSELING:   Reviewed preventive health counseling, as reflected in patient instructions       Regular exercise       Healthy  "diet/nutrition    Estimated body mass index is 28.68 kg/m  as calculated from the following:    Height as of 7/26/19: 1.664 m (5' 5.5\").    Weight as of 7/26/19: 79.4 kg (175 lb).    Weight management plan: Discussed healthy diet and exercise guidelines     reports that she is a non-smoker but has been exposed to tobacco smoke. She has never used smokeless tobacco.      Counseling Resources:  ATP IV Guidelines  Pooled Cohorts Equation Calculator  Breast Cancer Risk Calculator  FRAX Risk Assessment  ICSI Preventive Guidelines  Dietary Guidelines for Americans, 2010  USDA's MyPlate  ASA Prophylaxis  Lung CA Screening    YARA Morales CNP  [facility]  "

## 2019-08-27 NOTE — LETTER
September 4, 2019    Aretha Hooks  [address]  [place] [address]    Dear ,  This letter is regarding your recent Pap smear (cervical cancer screening) and Human Papillomavirus (HPV) test.  We are happy to inform you that your Pap smear result is normal. Cervical cancer is closely linked with certain types of HPV. Your results showed no evidence of high-risk HPV.  We recommend you have your next PAP smear and HPV test in 5 years.  You will still need to return to the clinic every year for an annual exam and other preventive tests.  If you have additional questions regarding this result, please call our registered nurse, Keiko at [phone].  Sincerely,    Peg Walker, YARA CNP/rlm

## 2019-08-30 LAB
FINAL DIAGNOSIS: NORMAL
HPV HR 12 DNA CVX QL NAA+PROBE: NEGATIVE
HPV16 DNA SPEC QL NAA+PROBE: NEGATIVE
HPV18 DNA SPEC QL NAA+PROBE: NEGATIVE
SPECIMEN DESCRIPTION: NORMAL
SPECIMEN SOURCE CVX/VAG CYTO: NORMAL

## 2019-09-03 LAB
COPATH REPORT: NORMAL
PAP: NORMAL

## 2019-09-28 ENCOUNTER — HOSPITAL ENCOUNTER (EMERGENCY)
Facility: CLINIC | Age: 64
Discharge: HOME | End: 2019-09-28
Attending: FAMILY MEDICINE | Admitting: FAMILY MEDICINE
Payer: COMMERCIAL

## 2019-09-28 ENCOUNTER — APPOINTMENT (OUTPATIENT)
Dept: GENERAL RADIOLOGY | Facility: CLINIC | Age: 64
End: 2019-09-28
Attending: FAMILY MEDICINE
Payer: COMMERCIAL

## 2019-09-28 VITALS
WEIGHT: 180 LBS | SYSTOLIC BLOOD PRESSURE: 161 MMHG | RESPIRATION RATE: 16 BRPM | DIASTOLIC BLOOD PRESSURE: 99 MMHG | OXYGEN SATURATION: 100 % | HEIGHT: 65 IN | BODY MASS INDEX: 29.99 KG/M2 | TEMPERATURE: 97.4 F

## 2019-09-28 DIAGNOSIS — M54.50 ACUTE BILATERAL LOW BACK PAIN WITHOUT SCIATICA: Primary | ICD-10-CM

## 2019-09-28 DIAGNOSIS — I10 ESSENTIAL HYPERTENSION: ICD-10-CM

## 2019-09-28 PROCEDURE — G0463 HOSPITAL OUTPT CLINIC VISIT: HCPCS | Performed by: FAMILY MEDICINE

## 2019-09-28 PROCEDURE — 72100 X-RAY EXAM L-S SPINE 2/3 VWS: CPT

## 2019-09-28 PROCEDURE — 99214 OFFICE O/P EST MOD 30 MIN: CPT | Mod: Z6 | Performed by: FAMILY MEDICINE

## 2019-09-28 RX ORDER — MELOXICAM 15 MG/1
15 TABLET ORAL DAILY
Qty: 15 TABLET | Refills: 0 | Status: SHIPPED | OUTPATIENT
Start: 2019-09-28 | End: 2020-05-19

## 2019-09-28 RX ORDER — CYCLOBENZAPRINE HCL 10 MG
10 TABLET ORAL 3 TIMES DAILY PRN
Qty: 20 TABLET | Refills: 0 | Status: SHIPPED | OUTPATIENT
Start: 2019-09-28 | End: 2019-10-04

## 2019-09-28 ASSESSMENT — MIFFLIN-ST. JEOR: SCORE: 1382.35

## 2019-09-28 NOTE — ED AVS SNAPSHOT
[facility] Emergency Department  [address] [facility] [address]  Phone:  [phone]  Fax:  [phone]                                    Aretha Hooks   MRN: [id number]    Department:  [facility] Emergency Department   Date of Visit:  9/28/2019           After Visit Summary Signature Page    I have received my discharge instructions, and my questions have been answered. I have discussed any challenges I see with this plan with the nurse or doctor.    ..........................................................................................................................................  Patient/Patient Representative Signature      ..........................................................................................................................................  Patient Representative Print Name and Relationship to Patient    ..................................................               ................................................  Date                                   Time    ..........................................................................................................................................  Reviewed by Signature/Title    ...................................................              ..............................................  Date                                               Time          22EPIC Rev 08/18

## 2019-09-28 NOTE — ED PROVIDER NOTES
History     Chief Complaint   Patient presents with     Back Pain     has had on and off for years, is seeing [facility] and has MRI this week, however she bent over to pick something up and her back pain started getting worse, took Ibuprofen and heat and pain is the same,      HPI  Aretha Hooks is a 63 year old female who has a history of chronic low back pain. She presents to the urgent care for acute worsening of her pain which started around noon today while she was bending over trying to screw in a screen into her wall when her low back midline region started to hurt. Currently at 5/10, was up to 10/10 right after it started. No numbness and tingling in her lower extremities, no saddle paresthesia, no troubles with incontinence. Sitting and being make it better, standing has been hard for her.  She tried 800mg ibuprofen but this did not help any. She has not been able to walk as bearing weight makes the pain significantly worse. Prior level of functioning- able to walk by herself.  She was seen by [facility] yesterday and plan was to have an MRI completed.  No prior back surgeries.        Allergies:  No Known Allergies    Problem List:    Patient Active Problem List    Diagnosis Date Noted     ETD (Eustachian tube dysfunction), right 07/17/2018     Priority: Medium     Brain aneurysm 04/11/2018     Priority: Medium     Sensory disorder 04/11/2018     Priority: Medium     Tubular adenoma of colon 11/16/2017     Priority: Medium     Collected: 11/10/2017   Received: 11/10/2017   Reported: 11/15/2017 23:36   Ordering Phy(s): SARAH BETH RODRIGUEZ     For improved result formatting, select 'View Enhanced Report Format'   under Linked Documents section.     SPECIMEN(S):   Colon polyp, right     FINAL DIAGNOSIS:   Colon polyp, right:   - Tubular adenoma.   - Negative for high grade dysplasia or malignancy.        Vitamin B12 deficiency (non anemic) 11/18/2015     Priority: Medium     CARDIOVASCULAR SCREENING; LDL GOAL LESS  THAN 130 11/18/2015     Priority: Medium     Hypertriglyceridemia 05/31/2012     Priority: Medium     Tear meniscus knee 03/02/2009     Priority: Medium     Other specified disorder of rectum and anus 12/07/2007     Priority: Medium      11/07  - A 11 mm polyp at 6 cm. Resected and retrieved.                      - The examination was otherwise normal pathology  Carcinoid tumor:  follow up with Dr White Gastrienterologist       Essential hypertension 11/03/2005     Priority: Medium     Medications:  Diovan 160 mg, labetalol    , chlorthalidone 25 mg bid  BP Readings from Last 3 Encounters:   11/18/15 136/86   12/10/14 123/82   04/07/14 122/89                 Past Medical History:    Past Medical History:   Diagnosis Date     ASCUS of cervix with negative high risk HPV 3/2011     Carcinoid tumor  of colon      Other vitamin B12 deficiency anemia      Unspecified essential hypertension 1990       Past Surgical History:    Past Surgical History:   Procedure Laterality Date     C APPENDECTOMY  Age 12    Appendectomy     C LIGATE FALLOPIAN TUBE  1984    Tubal Ligation     COLONOSCOPY N/A 11/10/2017    Procedure: COMBINED COLONOSCOPY, SINGLE OR MULTIPLE BIOPSY/POLYPECTOMY BY BIOPSY;  colonoscopy;  Surgeon: Emil Vaz MD;  Location: [facility] KNEE SCOPE,MED+LAT MENIS REPAIR  2009    left knee     SURGICAL HISTORY OF -   2004    Cervical dilatation with curettage and hysteroscopy with rollerball endometrial ablation     SURGICAL HISTORY OF -   1/2004    Laparoscopic Cholecystectomy     SURGICAL HISTORY OF -   1988    Cryocautery to cervix for bleeding     SURGICAL HISTORY OF -   2004    endometrial ablation     SURGICAL HISTORY OF -   1/2008    carcinoid tumor rectum  [facility] Yeison Lawson       Family History:    Family History   Problem Relation Age of Onset     Cancer Mother         Renal     Hypertension Father      Respiratory Father         uses cpap     Eye Disorder Father         cataracts      "Hypertension Brother      Musculoskeletal Disorder Sister         back surg.     Genitourinary Problems Sister      Cancer Sister         skin cancer     Gynecology Sister         hysterectomy     C.A.D. Paternal Grandmother      Breast Cancer No family hx of      Cancer - colorectal No family hx of      Colon Cancer No family hx of        Social History:  Marital Status:   [2]  Social History     Tobacco Use     Smoking status: Passive Smoke Exposure - Never Smoker     Smokeless tobacco: Never Used     Tobacco comment: exposed to 's cigarette smoke   Substance Use Topics     Alcohol use: Yes     Comment: occ     Drug use: No        Medications:    cyclobenzaprine (FLEXERIL) 10 MG tablet  meloxicam (MOBIC) 15 MG tablet  CALTRATE 600 + D 600-200 MG-IU OR TABS  cyanocobalamin (CYANOCOBALAMIN) 1000 MCG/ML injection  Fish Oil OIL  hydrochlorothiazide (MICROZIDE) 12.5 MG capsule  labetalol (NORMODYNE) 100 MG tablet  loratadine (CLARITIN) 10 MG tablet  losartan (COZAAR) 50 MG tablet  MULTIVITAMIN TABS   OR  NEEDLES, ANY SIZE  Syringe, Disposable, (SYRINGE/CANNULA) 3 ML MISC          Review of Systems   ROS Negative except as in the history.      Physical Exam   BP: (!) 161/99  Heart Rate: 53  Temp: 97.4  F (36.3  C)  Resp: 16  Height: 165.1 cm (5' 5\")  Weight: 81.6 kg (180 lb)  SpO2: 100 %      Physical Exam   General - Awake and alert, in obvious painful distress when she tried to stand. Afebrile, not pale well hydrated.  Head - Normocephalic, atraumatic.  Musculoskeletal: Not wanting to stand as she reports that this greatly increased her pain (attempted it but sat back down in the wheelchair).   Back: No obvious deformity but exam not comprehensive as she is sitting in a wheelchair, point tenderness over the lumar region, midline.  Neurologic - Cranial nerves 2-12 intact, Pateller reflexes intact. Muscle tone, bulk and strength within normal limits throughout.  Psych - Judgment and mental status are " clear, patient has reasonable insight. Mood is stable.            ED Course        Procedures                   Results for orders placed or performed during the hospital encounter of 09/28/19 (from the past 24 hour(s))   Lumbar spine XR, 2-3 views    Narrative    LUMBAR SPINE TWO TO THREE VIEWS 9/28/2019 6:33 PM     COMPARISON: None.    HISTORY: Acute severe midline low back pain making it hard for her to  stand, no known preceding trauma.      Impression    IMPRESSION: There is mild right convex curvature of the lumbar spine  centered at the L2-L3 level. Alignment of the lumbar vertebrae is  otherwise normal. Vertebral body heights of the lumbar spine are  normal. There is degenerative endplate spurring at L4-L5 and L5-S1  levels. There is facet arthropathy at the L3-L4, L4-L5 and L5-S1  levels. There is no evidence for fracture of the lumbar spine.        Result(s) reviewed with patient.      Medications - No data to display    Assessments & Plan (with Medical Decision Making)     I have reviewed the nursing notes.      New Prescriptions    CYCLOBENZAPRINE (FLEXERIL) 10 MG TABLET    Take 1 tablet (10 mg) by mouth 3 times daily as needed for muscle spasms    MELOXICAM (MOBIC) 15 MG TABLET    Take 1 tablet (15 mg) by mouth daily for 14 days       Final diagnoses:   Essential hypertension   Acute bilateral low back pain without sciatica - Acute on chronic     I have reviewed the findings, diagnosis, plan and need for follow up with the patient. No fracture, multilevel osteoarthritis. Acuity decreasing the likelihood of an infection. Suspect ligamental strain from when she was bending.  To follow up with PCP with regards to high blood pressure.    See below for summary of discussion(s) with patient    No acute injury noted on xray.  Continue to work with the spine specialist.    To help with acute pain,  the Meloxicam and take daily with meals, while on this medication, avoid other nsaids (no aspirin,  Naproxen, Advil, Alleve). The only over the counter medication you can take with this Meloxicam is Tylenol 1000mg three times a day for now, ok to back down and use as needed once you feel better.    9/28/2019   [facility] EMERGENCY DEPARTMENT     Cecy Bernard MD  09/28/19 [id number]

## 2019-09-28 NOTE — DISCHARGE INSTRUCTIONS
Osteoarthritis in the low back  No acute injury noted on xray.  Continue to work with the spine specialist.    To help with acute pain,  the Meloxicam and take daily with meals, while on this medication, avoid other nsaids (no aspirin, Naproxen, Advil, Alleve). The only over the counter medication you can take with this Meloxicam is Tylenol 1000mg three times a day for now, ok to back down and use as needed once you feel better.

## 2019-10-07 ENCOUNTER — HOSPITAL ENCOUNTER (OUTPATIENT)
Dept: MRI IMAGING | Facility: CLINIC | Age: 64
Discharge: HOME | End: 2019-10-07
Attending: NURSE PRACTITIONER | Admitting: NURSE PRACTITIONER
Payer: COMMERCIAL

## 2019-10-07 DIAGNOSIS — M54.10 RADICULOPATHY: ICD-10-CM

## 2019-10-07 PROCEDURE — 72148 MRI LUMBAR SPINE W/O DYE: CPT

## 2019-10-10 ENCOUNTER — TRANSFERRED RECORDS (OUTPATIENT)
Dept: HEALTH INFORMATION MANAGEMENT | Facility: CLINIC | Age: 64
End: 2019-10-10

## 2019-10-23 ENCOUNTER — RADIOLOGY INJECTION OFFICE VISIT (OUTPATIENT)
Dept: PALLIATIVE MEDICINE | Facility: CLINIC | Age: 64
End: 2019-10-23
Payer: COMMERCIAL

## 2019-10-23 ENCOUNTER — HOSPITAL ENCOUNTER (OUTPATIENT)
Dept: RADIOLOGY | Facility: CLINIC | Age: 64
Discharge: HOME | End: 2019-10-23
Attending: ANESTHESIOLOGY | Admitting: ANESTHESIOLOGY
Payer: COMMERCIAL

## 2019-10-23 VITALS — DIASTOLIC BLOOD PRESSURE: 71 MMHG | SYSTOLIC BLOOD PRESSURE: 132 MMHG | HEART RATE: 63 BPM | RESPIRATION RATE: 16 BRPM

## 2019-10-23 DIAGNOSIS — M47.816 SPONDYLOSIS OF LUMBAR REGION WITHOUT MYELOPATHY OR RADICULOPATHY: ICD-10-CM

## 2019-10-23 DIAGNOSIS — M47.816 LUMBAR FACET ARTHROPATHY: Primary | ICD-10-CM

## 2019-10-23 PROCEDURE — 25000128 H RX IP 250 OP 636: Performed by: ANESTHESIOLOGY

## 2019-10-23 PROCEDURE — 64493 INJ PARAVERT F JNT L/S 1 LEV: CPT | Mod: 50 | Performed by: ANESTHESIOLOGY

## 2019-10-23 PROCEDURE — 64493 INJ PARAVERT F JNT L/S 1 LEV: CPT | Mod: TC,50

## 2019-10-23 PROCEDURE — 64494 INJ PARAVERT F JNT L/S 2 LEV: CPT | Mod: 50 | Performed by: ANESTHESIOLOGY

## 2019-10-23 PROCEDURE — 25500064 ZZH RX 255 OP 636: Performed by: ANESTHESIOLOGY

## 2019-10-23 RX ORDER — LIDOCAINE HYDROCHLORIDE 10 MG/ML
5 INJECTION, SOLUTION EPIDURAL; INFILTRATION; INTRACAUDAL; PERINEURAL ONCE
Status: COMPLETED | OUTPATIENT
Start: 2019-10-23 | End: 2019-10-23

## 2019-10-23 RX ORDER — METHYLPREDNISOLONE ACETATE 40 MG/ML
40 INJECTION, SUSPENSION INTRA-ARTICULAR; INTRALESIONAL; INTRAMUSCULAR; SOFT TISSUE ONCE
Status: COMPLETED | OUTPATIENT
Start: 2019-10-23 | End: 2019-10-23

## 2019-10-23 RX ORDER — BUPIVACAINE HYDROCHLORIDE 5 MG/ML
10 INJECTION, SOLUTION PERINEURAL ONCE
Status: COMPLETED | OUTPATIENT
Start: 2019-10-23 | End: 2019-10-23

## 2019-10-23 RX ADMIN — IOHEXOL 1.5 ML: 300 INJECTION, SOLUTION INTRAVENOUS at 10:46

## 2019-10-23 RX ADMIN — BUPIVACAINE HYDROCHLORIDE 20 MG: 5 INJECTION, SOLUTION EPIDURAL; INTRACAUDAL at 10:46

## 2019-10-23 RX ADMIN — METHYLPREDNISOLONE ACETATE 80 MG: 40 INJECTION, SUSPENSION INTRA-ARTICULAR; INTRALESIONAL; INTRAMUSCULAR; SOFT TISSUE at 10:47

## 2019-10-23 RX ADMIN — LIDOCAINE HYDROCHLORIDE 4 ML: 10 INJECTION, SOLUTION EPIDURAL; INFILTRATION; INTRACAUDAL; PERINEURAL at 10:46

## 2019-10-23 ASSESSMENT — PAIN SCALES - GENERAL
PAINLEVEL: NO PAIN (0)
PAINLEVEL: MILD PAIN (2)

## 2019-10-23 NOTE — PROGRESS NOTES
Pre procedure Diagnosis: lumbar facet arthropathy, Lumbosacral spondylosis   Post procedure Diagnosis: Same  Procedure performed: lumbar facet joint injections at L3-4 and L4-5 bilaterally, fluoroscopically guided, contrast controlled  Indication:  Therapeutic for pain  Anesthesia: none  Complication:  none  Operators: Didier Contreras MD    Indications:   Aretha Hooks is a 63 year old female was sent by Lilliana Song NP for lumbar facet procedures.  The patient has a history of chronic lower back pain without radiation.  Exam shows lumbar tenderness and reproduction of pain with extension and lateral rotation of the lumbar spine.  They have tried conservative treatment including physical therapy, medications.    MRI OF THE LUMBAR SPINE WITHOUT CONTRAST 10/7/2019 6:38 PM   FINDINGS: There are five lumbar-type vertebrae for the purposes of  this dictation. Lumbar spine numbering is the same as on the  comparison study.     There is normal alignment of the lumbar vertebrae. Vertebral body  heights of the lumbar spine are normal. Marrow signal throughout the  lumbar vertebrae is within normal limits. There is no evidence for  fracture or pathologic bony lesion of the lumbar spine.      There is loss of disc height, disc desiccation and posterior disc  bulging at the L2-L3, L3-L4 and L4-L5 levels. The L1-L2 and L5-S1  discs remain normal in height, contour and signal intensity.     The tip of the conus medullaris is at the lower L1 level which is  within normal limits. There is no evidence for intrathecal  abnormality.      Level by level:      L1-L2: There is no spinal canal or neural foraminal stenosis at this  level. No change from the comparison study.     L2-L3: There is a circumferential disc bulge. There is no spinal canal  or neural foraminal stenosis at this level. No change from the  comparison study.     L3-L4: There is a circumferential disc bulge and moderate facet  arthropathy bilaterally.  These findings result in minimal spinal canal  narrowing but no significant foraminal narrowing on either side. Facet  arthropathy has progressed significantly since the comparison study.     L4-L5: There is a circumferential disc bulge and severe facet  arthropathy bilaterally. These findings result in mild spinal canal  narrowing and minimal bilateral neural foraminal narrowing. Facet  arthropathy has progressed significantly since the comparison study.     L5-S1: There is severe facet arthropathy bilaterally. There is no  spinal canal or neural foraminal stenosis at this level. No change  from the comparison study.                                                                    IMPRESSION: Degenerative changes of the lumbar spine as described  above.     PAULA MITCHELL MD    Options/alternatives, benefits and risks were discussed with the patient including bleeding, infection, flared pain, tissue trauma, exposure to radiation, reaction to medications including seizure, spinal cord injury, paralysis, weakness, numbness and headache.     Questions were answered to her satisfaction and she wishes to proceed. Voluntary informed consent was obtained and signed.     Vitals were reviewed: Yes  BP (!) 134/94   Pulse 70   Resp 18   LMP 04/01/2008   Allergies were reviewed:  Yes   Medications were reviewed:  Yes   Pre-procedure pain score: 0/10    Procedure:  After obtaining signed informed consent, the patient was brought into the procedure suite and was placed in a prone position on the procedure table.   A Pause for the Cause was performed.  The patient was prepped and draped in the usual sterile fashion.     Under AP fluoroscopic guidance the L3-4 and L4-5 facet joints were identified bilaterally, and the C-arm was rotated obliquely to the affected side to open the joint spaces, first to the right, then the left. A total of 4 ml of 1% lidocaine was injected at the needle entry points and needle tracts. Then, 25  gauge 5 inch spinal needles were inserted and advanced under fluoroscopic guidance targeting the superior articular process of each joint. Once the needles made contact with the SAP, they were rotated and advanced into the joints.    AP and lateral fluoroscopic views were obtained to confirm the needle placement. Then,  Omnipaque 300 contrast dye was injected after negative aspiration for heme and CSF in each joint, confirming appropriate needle placement.  A total of 1.5 ml of Omnipaque was used.  Omnipaque wasted:  8.5 ml.    Then, each joint was injected with 1.5 ml of a combination of Depomedrol 80 mg and 0.5% bupivacaine 4 ml (total injectate volume 6 ml) and the needles were flushed with local anesthetic as they were withdrawn.       Hemostasis was achieved, the area was cleaned, and bandaids were placed when appropriate.  The patient tolerated the procedure well, and was taken to the recovery room.    Images were saved to PACS.    Post-procedure pain score: 2/10  Follow-up includes:   -f/u phone call in one week  -f/u with referring provider    Didier Contreras MD  [facility] [place]

## 2019-10-23 NOTE — DISCHARGE INSTRUCTIONS
[facility]   Procedure Discharge Instructions    Today you saw:    Dr. Didier Contreras      You had an:  Lumbar facet joint steroid injection        Medications used:  Lidocaine   Bupivacaine  Depo-medrol  Omnipaque              Be cautious when walking. Numbness and/or weakness in the lower extremities may occur for up to 6-8 hours after the procedure due to effect of the local anesthetic    Do not drive for 6 hours. The effect of the local anesthetic could slow your reflexes.     You may resume your regular activities after 24 hours    Avoid strenuous activity for the first 24 hours    You may shower, however avoid swimming, tub baths or hot tubs for 24 hours following your procedure    You may have a mild to moderate increase in pain for several days following the injection.    It may take up to 14 days for the steroid medication to start working although you may feel the effect as early as a few days after the procedure.       You may use ice packs for 10-15 minutes, 3 to 4 times a day at the injection site for comfort    Do not use heat to painful areas for 6 to 8 hours. This will give the local anesthetic time to wear off and prevent you from accidentally burning your skin.     Unless you have been directed to avoid the use of anti-inflammatory medications (NSAIDS), you may use medications such as ibuprofen, Aleve or Tylenol for pain control if needed.     Possible side effects of steroids that you may experience include flushing, elevated blood pressure, increased appetite, mild headaches and restlessness.  All of these symptoms will get better with time.    If you experience any of the following, call the Pain Clinic during work hours (Mon-Friday 8-4:30 pm) at [phone] or the Provider Line after hours at [phone]:  -Fever over 100 degree F  -Swelling, bleeding, redness, drainage, warmth at the injection site  -Progressive weakness or numbness in your legs   -Loss of  bowel or bladder function  -Unusual new onset of pain that is not improving

## 2019-10-23 NOTE — IP AVS SNAPSHOT
MRN:[id number]                      After Visit Summary   10/23/2019    Aretha Hooks    MRN: [id number]           Visit Information        Provider Department      10/23/2019 10:15 AM [facility]           Review of your medicines      UNREVIEWED medicines. Ask your doctor about these medicines       Dose / Directions   CALTRATE 600 + D 600-200 MG-IU Tabs      2 TABLET DAILY  Refills:  0     cyanocobalamin 1000 MCG/ML injection  Commonly known as:  CYANOCOBALAMIN  Used for:  Vitamin B12 deficiency (non anemic)      Dose:  1 mL  Inject 1 mL (1,000 mcg) into the muscle every 30 days  Quantity:  1 mL  Refills:  8     cyclobenzaprine 10 MG tablet  Commonly known as:  FLEXERIL  Ask about: Should I take this medication?      Dose:  10 mg  Take 1 tablet (10 mg) by mouth 3 times daily as needed for muscle spasms  Quantity:  20 tablet  Refills:  0     doxycycline hyclate 100 MG capsule  Commonly known as:  VIBRAMYCIN  Used for:  Tick bite, initial encounter  Ask about: Should I take this medication?      Dose:  200 mg  Take 2 capsules (200 mg) by mouth once for 1 dose  Quantity:  2 capsule  Refills:  0     Fish Oil Oil      1 capsule twice daily - unknown dose  Refills:  0     hydrochlorothiazide 12.5 MG capsule  Commonly known as:  MICROZIDE  Used for:  Essential hypertension with goal blood pressure less than 140/90      Dose:  12.5 mg  Take 1 capsule (12.5 mg) by mouth every morning  Quantity:  90 capsule  Refills:  3     labetalol 100 MG tablet  Commonly known as:  NORMODYNE  Used for:  Essential hypertension with goal blood pressure less than 140/90      TAKE THREE TABLETS BY MOUTH TWO TIMES A DAY  Quantity:  180 tablet  Refills:  11     loratadine 10 MG tablet  Commonly known as:  CLARITIN  Used for:  Dysfunction of right eustachian tube      Dose:  10 mg  Take 1 tablet (10 mg) by mouth daily  Quantity:  30 tablet  Refills:  1     losartan 50 MG tablet  Commonly known  as:  COZAAR  Used for:  Essential hypertension with goal blood pressure less than 140/90      Dose:  50 mg  Take 1 tablet (50 mg) by mouth daily  Quantity:  90 tablet  Refills:  3     meloxicam 15 MG tablet  Commonly known as:  MOBIC      Dose:  15 mg  Take 1 tablet (15 mg) by mouth daily for 14 days  Quantity:  15 tablet  Refills:  0     MULTIVITAMIN TABS   OR      1 TABLET DAILY  Refills:  0     oseltamivir 75 MG capsule  Commonly known as:  TAMIFLU  Used for:  Influenza due to influenza virus, type A, human  Ask about: Should I take this medication?      Dose:  75 mg  Take 1 capsule (75 mg) by mouth 2 times daily for 5 days  Quantity:  10 capsule  Refills:  0        CONTINUE these medicines which have NOT CHANGED       Dose / Directions   NEEDLES, ANY SIZE  Used for:  Vitamin B12 deficiency (non anemic)      Use once a month for B12 injection  Quantity:  1 Box  Refills:  1     Syringe/Cannula 3 ML Misc  Used for:  Vitamin B12 deficiency (non anemic)      3 ml syringe  Quantity:  12 each  Refills:  1              Protect others around you: Learn how to safely use, store and throw away your medicines at www.disposemymeds.org.       Follow-ups after your visit       Care Instructions       Further instructions from your care team       [facility]   Procedure Discharge Instructions    Today you saw:    Dr. Didier Contreras      You had an:  Lumbar facet joint steroid injection        Medications used:  Lidocaine   Bupivacaine  Depo-medrol  Omnipaque              Be cautious when walking. Numbness and/or weakness in the lower extremities may occur for up to 6-8 hours after the procedure due to effect of the local anesthetic    Do not drive for 6 hours. The effect of the local anesthetic could slow your reflexes.     You may resume your regular activities after 24 hours    Avoid strenuous activity for the first 24 hours    You may shower, however avoid swimming, tub baths or hot tubs for  24 hours following your procedure    You may have a mild to moderate increase in pain for several days following the injection.    It may take up to 14 days for the steroid medication to start working although you may feel the effect as early as a few days after the procedure.       You may use ice packs for 10-15 minutes, 3 to 4 times a day at the injection site for comfort    Do not use heat to painful areas for 6 to 8 hours. This will give the local anesthetic time to wear off and prevent you from accidentally burning your skin.     Unless you have been directed to avoid the use of anti-inflammatory medications (NSAIDS), you may use medications such as ibuprofen, Aleve or Tylenol for pain control if needed.     Possible side effects of steroids that you may experience include flushing, elevated blood pressure, increased appetite, mild headaches and restlessness.  All of these symptoms will get better with time.    If you experience any of the following, call the Pain Clinic during work hours (Mon-Friday 8-4:30 pm) at [phone] or the Provider Line after hours at [phone]:  -Fever over 100 degree F  -Swelling, bleeding, redness, drainage, warmth at the injection site  -Progressive weakness or numbness in your legs   -Loss of bowel or bladder function  -Unusual new onset of pain that is not improving          Additional Information About Your Visit       [facility] Information    [facility] gives you secure access to your electronic health record. If you see a primary care provider, you can also send messages to your care team and make appointments. If you have questions, please call your primary care clinic.  If you do not have a primary care provider, please call [phone] and they will assist you.       Care EveryWhere ID    This is your Care EveryWhere ID. This could be used by other organizations to access your [facility] medical records  [id number]       Your Vitals Were     Last Period   04/01/2008               Primary Care Provider Office Phone # Fax #    Judy De La Garzagaurang, MELINA [phone]      Equal Access to Services    CAIN LOPEZ : Hadsarah aad ku hadcoreyjose Adams, lluvia jersonheronha, jacob karoxana leigh, froilan vera lasherineric anabell. So [facility] [phone].    ATENCIÓN: Si habla español, tiene a kate disposición servicios gratuitos de asistencia lingüística. Yayaame al [phone].    We comply with applicable federal civil rights laws and [place] laws. We do not discriminate on the basis of race, color, national origin, age, disability, sex, sexual orientation, or gender identity.           Thank you!    Thank you for choosing [facility] for your care. Our goal is always to provide you with excellent care. Hearing back from our patients is one way we can continue to improve our services. Please take a few minutes to complete the written survey that you may receive in the mail after you visit with us. Thank you!            Medication List      Medications          Morning Afternoon Evening Bedtime As Needed    NEEDLES, ANY SIZE  INSTRUCTIONS:  Use once a month for B12 injection                     Syringe/Cannula 3 ML Misc  INSTRUCTIONS:  3 ml syringe                       ASK your doctor about these medications          Morning Afternoon Evening Bedtime As Needed    CALTRATE 600 + D 600-200 MG-IU Tabs  INSTRUCTIONS:  2 TABLET DAILY                     cyanocobalamin 1000 MCG/ML injection  Also known as:  CYANOCOBALAMIN  INSTRUCTIONS:  Inject 1 mL (1,000 mcg) into the muscle every 30 days                     cyclobenzaprine 10 MG tablet  Also known as:  FLEXERIL  INSTRUCTIONS:  Take 1 tablet (10 mg) by mouth 3 times daily as needed for muscle spasms  Ask about: Should I take this medication?                     doxycycline hyclate 100 MG capsule  Also known as:  VIBRAMYCIN  INSTRUCTIONS:  Take 2 capsules (200 mg) by mouth once for 1 dose  Ask about: Should I take this medication?                      Fish Oil Oil  INSTRUCTIONS:  1 capsule twice daily - unknown dose                     hydrochlorothiazide 12.5 MG capsule  Also known as:  MICROZIDE  INSTRUCTIONS:  Take 1 capsule (12.5 mg) by mouth every morning                     labetalol 100 MG tablet  Also known as:  NORMODYNE  INSTRUCTIONS:  TAKE THREE TABLETS BY MOUTH TWO TIMES A DAY                     loratadine 10 MG tablet  Also known as:  CLARITIN  INSTRUCTIONS:  Take 1 tablet (10 mg) by mouth daily                     losartan 50 MG tablet  Also known as:  COZAAR  INSTRUCTIONS:  Take 1 tablet (50 mg) by mouth daily                     meloxicam 15 MG tablet  Also known as:  MOBIC  INSTRUCTIONS:  Take 1 tablet (15 mg) by mouth daily for 14 days                     MULTIVITAMIN TABS   OR  INSTRUCTIONS:  1 TABLET DAILY                     oseltamivir 75 MG capsule  Also known as:  TAMIFLU  INSTRUCTIONS:  Take 1 capsule (75 mg) by mouth 2 times daily for 5 days  Ask about: Should I take this medication?

## 2019-10-23 NOTE — IP AVS SNAPSHOT
[facility]  [address]  [facility] [address]  Phone:  [phone]  Fax:  [phone]                                    After Visit Summary   10/23/2019    Aretha Hooks    MRN: [id number]           After Visit Summary Signature Page    I have received my discharge instructions, and my questions have been answered. I have discussed any challenges I see with this plan with the nurse or doctor.    ..........................................................................................................................................  Patient/Patient Representative Signature      ..........................................................................................................................................  Patient Representative Print Name and Relationship to Patient    ..................................................               ................................................  Date                                   Time    ..........................................................................................................................................  Reviewed by Signature/Title    ...................................................              ..............................................  Date                                               Time          22EPIC Rev 08/18

## 2019-11-21 ENCOUNTER — HOSPITAL ENCOUNTER (OUTPATIENT)
Dept: PHYSICAL THERAPY | Facility: CLINIC | Age: 64
Setting detail: THERAPIES SERIES
End: 2019-11-21
Attending: NURSE PRACTITIONER
Payer: COMMERCIAL

## 2019-11-21 PROCEDURE — 97161 PT EVAL LOW COMPLEX 20 MIN: CPT | Mod: GP | Performed by: PHYSICAL THERAPIST

## 2019-11-21 PROCEDURE — 97140 MANUAL THERAPY 1/> REGIONS: CPT | Mod: GP | Performed by: PHYSICAL THERAPIST

## 2019-11-21 PROCEDURE — 97110 THERAPEUTIC EXERCISES: CPT | Mod: GP | Performed by: PHYSICAL THERAPIST

## 2019-11-21 NOTE — PROGRESS NOTES
11/21/19 1400   General Information   Type of Visit Initial OP Ortho PT Evaluation   Start of Care Date 11/21/19   Referring Physician Duncan   Patient/Family Goals Statement walking 1-2 miles, get rid of pain    Orders Evaluate and Treat   Date of Order 11/11/19   Certification Required? No   Medical Diagnosis lumbosacral spondylosis with facet mediated pain syndrome   Surgical/Medical history reviewed Yes   Precautions/Limitations no known precautions/limitations   General Information Comments PMH: HTN, arthritis, gall bladder removal, appendix removed   Body Part(s)   Body Part(s) Lumbar Spine/SI   Presentation and Etiology   Pertinent history of current problem (include personal factors and/or comorbidities that impact the POC) For years she has had back issues. On the right hip/thigh she was getting some burning and tingling. She went into see [facility] and they did an MRI. She went to bend down to the right side and couldn't get back up and had pain. She had injections but the pain is still there. She still has some numbness in the thigh region. If she stands or walks to long, it increases the burning. No buckling or weakness in the legs. Sleeping goes okay at night but in the morning, she is ready to get out of bed due to pain. No pain travels past the thigh region.    Impairments A. Pain;J. Burning;K. Numbness   Functional Limitations perform activities of daily living;perform required work activities   Symptom Location right back/leg   How/Where did it occur From insidious onset   Onset date of current episode/exacerbation 11/11/19   Chronicity Chronic   Pain rating (0-10 point scale) Best (/10);Worst (/10)   Best (/10) 4   Pain quality B. Dull;C. Aching   Frequency of pain/symptoms A. Constant   Pain/symptoms are: Worse in the morning   Pain/symptoms exacerbated by A. Sitting;B. Walking;C. Lifting;D. Carrying;G. Certain positions;I. Bending   Pain/symptoms eased by E. Changing positions;I. OTC medication(s)    Progression of symptoms since onset: Improved   Current / Previous Interventions   Diagnostic Tests: MRI   MRI Results Results   MRI results see media folder for details   Prior Level of Function   Prior Level of Function-Mobility indp    Prior Level of Function-ADLs indp    Functional Level Prior Comment indp   Current Level of Function   Current Community Support Family/friend caregiver   Patient role/employment history A. 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 No   Have you fallen and had an injury in the past year? No   Is patient a fall risk? No   Abuse Screen (yes response referral indicated)   Feels Unsafe at Home or Work/School no   Feels Threatened by Someone no   Does Anyone Try to Keep You From Having Contact with Others or Doing Things Outside Your Home? no   Physical Signs of Abuse Present no   System Outcome Measures   Outcome Measures Low Back Pain (see Oswestry and Rtip)   Lumbar Spine/SI Objective Findings   Integumentary no concerns   Posture right lateral shift, decreased lumbar lordosis    Gait/Locomotion right lateral shift, hip drop on right with trendelenburg right   Flexion ROM finger tips to toes (pulling in right side)    Extension ROM 0% limited- no increase in pain    Right Side Bending ROM finger tips 2 inches from knee joint line- pulling on left sdie   Left Side Bending ROM finger tips 2 inches from knee joint line- painful    Repeated Extension-Standing ROM no change in symptoms   Repeated Flexion-Standing ROM increased pain   Pelvic Screen SI: sacral compression -, distraction -, thigh thrust + on the right, Sacral approximation +   Hip Screen left ASIS and iliac crest left higher    Hip  Flexion (L2) Strength 4+/5 B    Hip Abduction Strength 4-/5 R, 4/5 L    Hip Adduction Strength 5/5 B    Hip Extension Strength 4/5 R, 4+/5 L    Knee Flexion Strength 4/5 on right 4+/5 on L    Knee Extension (L3) Strength 5/5 B    Ankle Dorsiflexion (L4) Strength 5/5 B    Great Toe Extension (L5) Strength 4+/5  B    Ankle Plantar Flexion (S1) Strength 4+/5 B    Hamstring Flexibility WnL B    Hip Flexor Flexibility WNL B    Quadricep Flexibility WNL B    Piriformis Flexibility WNL B    SLR + on right   Christian Test - B    Ely Test - B    Crossover SLR - B    Slump Test - B    Spring Test positive pain with spring testing over L3-4-5   Segmental Mobility normal through L2, hypomobile L3-5   Sensation Testing WnL B    Palpation increased tenderness throughout right glut min and over QL    Planned Therapy Interventions   Planned Therapy Interventions balance training;gait training;joint mobilization;manual therapy;neuromuscular re-education;ROM;strengthening;stretching;transfer training   Planned Modality Interventions   Planned Modality Interventions Hot packs;Traction;Cryotherapy;Electrical stimulation   Clinical Impression   Criteria for Skilled Therapeutic Interventions Met yes, treatment indicated   PT Diagnosis chronic low back pain with acute exacerbation   Influenced by the following impairments decreased strength, joint hypomobility, poor posture, weak core, pain with spring testing    Functional limitations due to impairments standing, walking, sitting to long, lying flat on bed,    Clinical Presentation Stable/Uncomplicated   Clinical Presentation Rationale clinical decision making and chart review   Clinical Decision Making (Complexity) Low complexity   Therapy Frequency 1 time/week   Predicted Duration of Therapy Intervention (days/wks) 10 weeks   Risk & Benefits of therapy have been explained Yes   Patient, Family & other staff in agreement with plan of care Yes   Clinical Impression Comments Patient is a 63  year old female who presents with acute on chronic low back pain. Was bending down to the right about a week ago.    Education Assessment   Preferred Learning Style Listening;Demonstration;Pictures/video   Barriers to Learning No barriers   ORTHO GOALS   PT Ortho Eval Goals 1;2;3;4   Ortho Goal 1   Goal Identifier 1   Goal Description Patient will be able to sit all day at work without increased low back pain    Target Date 01/30/20   Ortho Goal 2   Goal Identifier 2   Goal Description Patient will be able to bend down and pick things up off the floor without increased low back pain.    Target Date 01/30/20   Ortho Goal 3   Goal Identifier 3   Goal Description Patient will be able to walk for 5-10 minutes before she needs ot sit due to back pain.    Target Date 01/30/20   Total Evaluation Time   PT Eval, Low Complexity Minutes (49333) 20

## 2019-11-25 ENCOUNTER — HOSPITAL ENCOUNTER (OUTPATIENT)
Dept: PHYSICAL THERAPY | Facility: CLINIC | Age: 64
Setting detail: THERAPIES SERIES
End: 2019-11-25
Attending: NURSE PRACTITIONER
Payer: COMMERCIAL

## 2019-11-25 PROCEDURE — 97110 THERAPEUTIC EXERCISES: CPT | Mod: GP | Performed by: PHYSICAL THERAPIST

## 2019-12-03 ENCOUNTER — HOSPITAL ENCOUNTER (OUTPATIENT)
Dept: PHYSICAL THERAPY | Facility: CLINIC | Age: 64
Setting detail: THERAPIES SERIES
End: 2019-12-03
Attending: NURSE PRACTITIONER
Payer: COMMERCIAL

## 2019-12-03 PROCEDURE — 97140 MANUAL THERAPY 1/> REGIONS: CPT | Mod: GP | Performed by: PHYSICAL THERAPIST

## 2019-12-03 PROCEDURE — 97110 THERAPEUTIC EXERCISES: CPT | Mod: GP | Performed by: PHYSICAL THERAPIST

## 2019-12-10 ENCOUNTER — HOSPITAL ENCOUNTER (OUTPATIENT)
Dept: PHYSICAL THERAPY | Facility: CLINIC | Age: 64
Setting detail: THERAPIES SERIES
End: 2019-12-10
Attending: NURSE PRACTITIONER
Payer: COMMERCIAL

## 2019-12-10 PROCEDURE — 97110 THERAPEUTIC EXERCISES: CPT | Mod: GP | Performed by: PHYSICAL THERAPIST

## 2020-02-12 NOTE — PROGRESS NOTES
Outpatient Physical Therapy Discharge Note     Patient: Aretha Hooks  : 1955    Beginning/End Dates of Reporting Period:  19 to 2020    Referring Provider: Song    Therapy Diagnosis: chronic low back pain with acute exacerbation     Client Self Report: no longer having any issues with pain. She went out to shovel snow this weekend and had a little soreness but it went away within a day. has been doing her exercises every day and they are gettin easier.     Objective Measurements:     Objective Measure: trunk ROM   Details: bending forward; No increase in pain, sidebending- no pain,  all ROM WNL        Goals:  Goal Identifier 1   Goal Description Patient will be able to sit all day at work without increased low back pain    Target Date 20   Date Met  12/10/19   Progress:     Goal Identifier 2   Goal Description Patient will be able to bend down and pick things up off the floor without increased low back pain.    Target Date 20   Date Met  12/10/19   Progress:     Goal Identifier 3   Goal Description Patient will be able to walk for 5-10 minutes before she needs ot sit due to back pain.    Target Date 20   Date Met  12/10/19   Progress:       Progress Toward Goals:   Progress this reporting period: At patient's last attended PT session, she was no longer having any further back pain. She had returned to her previous level of functioning. She was provided with an HEP to help decrease the risk of back pain returning. She did not need to follow back up for over 30 days so this episode of care is now being resolved.     Plan:  Discharge from therapy.    Discharge:    Reason for Discharge: Patient has met all goals.    Equipment Issued: theraband    Discharge Plan: Patient to continue home program.      Brenda Aldrich  PT, DPT       2020   [facility]  [address]   [address]  [place] [address]  [email or web address]   [email or web address]  Voicemail: [phone]

## 2020-04-12 DIAGNOSIS — E53.8 VITAMIN B12 DEFICIENCY (NON ANEMIC): ICD-10-CM

## 2020-04-13 NOTE — TELEPHONE ENCOUNTER
"Requested Prescriptions   Pending Prescriptions Disp Refills     cyanocobalamin (CYANOCOBALAMIN) 1000 MCG/ML injection [Pharmacy Med Name: Cyanocobalamin Injection Solution 1000 MCG/ML] 1 mL 7     Sig: Inject 1 mL (1,000 mcg) into the muscle every 30 days       Vitamin Supplements (Adult) Protocol Passed - 4/12/2020  7:01 AM        Passed - High dose Vitamin D not ordered        Passed - Recent (12 mo) or future (30 days) visit within the authorizing provider's specialty     Patient has had an office visit with the authorizing provider or a provider within the authorizing providers department within the previous 12 mos or has a future within next 30 days. See \"Patient Info\" tab in inbasket, or \"Choose Columns\" in Meds & Orders section of the refill encounter.              Passed - Medication is active on med list           Last Written Prescription Date:  7/18/2019  Last Fill Quantity: 1ml,  # refills: 8   Last office visit: 8/27/2019 with prescribing provider:  Aaron   Future Office Visit:      "

## 2020-04-14 NOTE — TELEPHONE ENCOUNTER
Routing refill request to provider for review/approval because:  Injection version  Last ordered by Dr. Harrison PRADHAN, RN, BSN

## 2020-04-17 RX ORDER — CYANOCOBALAMIN 1000 UG/ML
1 INJECTION, SOLUTION INTRAMUSCULAR; SUBCUTANEOUS
Qty: 1 ML | Refills: 7 | Status: SHIPPED | OUTPATIENT
Start: 2020-04-17 | End: 2020-12-24

## 2020-05-12 DIAGNOSIS — I10 ESSENTIAL HYPERTENSION WITH GOAL BLOOD PRESSURE LESS THAN 140/90: ICD-10-CM

## 2020-05-12 RX ORDER — LOSARTAN POTASSIUM 50 MG/1
50 TABLET ORAL DAILY
Qty: 30 TABLET | Refills: 0 | Status: SHIPPED | OUTPATIENT
Start: 2020-05-12 | End: 2020-05-19

## 2020-05-12 RX ORDER — LABETALOL 100 MG/1
TABLET, FILM COATED ORAL
Qty: 180 TABLET | Refills: 0 | Status: SHIPPED | OUTPATIENT
Start: 2020-05-12 | End: 2020-05-19

## 2020-05-12 RX ORDER — HYDROCHLOROTHIAZIDE 12.5 MG/1
CAPSULE ORAL
Qty: 30 CAPSULE | Refills: 0 | Status: SHIPPED | OUTPATIENT
Start: 2020-05-12 | End: 2020-05-19

## 2020-05-12 NOTE — TELEPHONE ENCOUNTER
"Requested Prescriptions   Pending Prescriptions Disp Refills     hydrochlorothiazide (MICROZIDE) 12.5 MG capsule [Pharmacy Med Name: hydroCHLOROthiazide Oral Capsule 12.5 MG] 90 capsule 0     Sig: TAKE ONE CAPSULE (12.5MG) BY MOUTH EVERY MORNING       Diuretics (Including Combos) Protocol Passed - 5/12/2020  7:01 AM        Passed - Blood pressure under 140/90 in past 12 months     BP Readings from Last 3 Encounters:   10/23/19 132/71   09/28/19 (!) 161/99   08/27/19 110/62                 Passed - Recent (12 mo) or future (30 days) visit within the authorizing provider's specialty     Patient has had an office visit with the authorizing provider or a provider within the authorizing providers department within the previous 12 mos or has a future within next 30 days. See \"Patient Info\" tab in inbasket, or \"Choose Columns\" in Meds & Orders section of the refill encounter.              Passed - Medication is active on med list        Passed - Patient is age 18 or older        Passed - No active pregancy on record        Passed - Normal serum creatinine on file in past 12 months     Recent Labs   Lab Test 06/08/19  0959   CR 0.73              Passed - Normal serum potassium on file in past 12 months     Recent Labs   Lab Test 06/08/19  0959   POTASSIUM 3.6                    Passed - Normal serum sodium on file in past 12 months     Recent Labs   Lab Test 05/14/19  1151                 Passed - No positive pregnancy test in past 12 months      Last Written Prescription Date:  5/14/19  Last Fill Quantity: 90,  # refills: 3   Last office visit: 8/27/2019 with prescribing provider:  Tino   Future Office Visit:               labetalol (NORMODYNE) 100 MG tablet [Pharmacy Med Name: Labetalol HCl Oral Tablet 100 MG] 180 tablet 0     Sig: TAKE THREE TABLETS BY MOUTH TWO TIMES A DAY       Beta-Blockers Protocol Passed - 5/12/2020  7:01 AM        Passed - Blood pressure under 140/90 in past 12 months     BP Readings from " "Last 3 Encounters:   10/23/19 132/71   09/28/19 (!) 161/99   08/27/19 110/62                 Passed - Patient is age 6 or older        Passed - Recent (12 mo) or future (30 days) visit within the authorizing provider's specialty     Patient has had an office visit with the authorizing provider or a provider within the authorizing providers department within the previous 12 mos or has a future within next 30 days. See \"Patient Info\" tab in inbasket, or \"Choose Columns\" in Meds & Orders section of the refill encounter.              Passed - Medication is active on med list      Last Written Prescription Date:  5/14/19  Last Fill Quantity: 180,  # refills: 11   Last office visit: 8/27/2019 with prescribing provider:  Tino   Future Office Visit:               losartan (COZAAR) 50 MG tablet [Pharmacy Med Name: Losartan Potassium Oral Tablet 50 MG] 90 tablet 0     Sig: Take 1 tablet (50 mg) by mouth daily       Angiotensin-II Receptors Passed - 5/12/2020  7:01 AM        Passed - Last blood pressure under 140/90 in past 12 months     BP Readings from Last 3 Encounters:   10/23/19 132/71   09/28/19 (!) 161/99   08/27/19 110/62                 Passed - Recent (12 mo) or future (30 days) visit within the authorizing provider's specialty     Patient has had an office visit with the authorizing provider or a provider within the authorizing providers department within the previous 12 mos or has a future within next 30 days. See \"Patient Info\" tab in inbasket, or \"Choose Columns\" in Meds & Orders section of the refill encounter.              Passed - Medication is active on med list        Passed - Patient is age 18 or older        Passed - No active pregnancy on record        Passed - Normal serum creatinine on file in past 12 months     Recent Labs   Lab Test 06/08/19  0959   CR 0.73       Ok to refill medication if creatinine is low          Passed - Normal serum potassium on file in past 12 months     Recent Labs   Lab " Test 06/08/19  0959   POTASSIUM 3.6                    Passed - No positive pregnancy test in past 12 months           Last Written Prescription Date:  5/14/19  Last Fill Quantity: 90,  # refills: 3   Last office visit: 8/27/2019 with prescribing provider:  Tino Villatoro Office Visit:

## 2020-05-12 NOTE — LETTER
[facility]  [address] [address]  Phone: [phone]       May 12, 2020         Aretha Hooks  [address]  [place] [address]            Dear Aretha:    We are concerned about your health care.  We recently provided you with medication refills.  Many medications require routine follow-up with your doctor.    Your prescription(s) have been refilled for 30 days so you may have time for the above noted follow-up. Please call to schedule soon so we can assure you have an appointment before your next refills are needed.    Thank you,      SELAM Stein / Rashida PRADHAN, RN, BSN

## 2020-05-19 ENCOUNTER — VIRTUAL VISIT (OUTPATIENT)
Dept: FAMILY MEDICINE | Facility: CLINIC | Age: 65
End: 2020-05-19
Payer: COMMERCIAL

## 2020-05-19 DIAGNOSIS — E53.8 VITAMIN B12 DEFICIENCY (NON ANEMIC): ICD-10-CM

## 2020-05-19 DIAGNOSIS — E78.1 HYPERTRIGLYCERIDEMIA: ICD-10-CM

## 2020-05-19 DIAGNOSIS — I10 ESSENTIAL HYPERTENSION: ICD-10-CM

## 2020-05-19 DIAGNOSIS — Z13.6 CARDIOVASCULAR SCREENING; LDL GOAL LESS THAN 130: ICD-10-CM

## 2020-05-19 DIAGNOSIS — I10 ESSENTIAL HYPERTENSION WITH GOAL BLOOD PRESSURE LESS THAN 140/90: Primary | ICD-10-CM

## 2020-05-19 PROBLEM — H69.91 ETD (EUSTACHIAN TUBE DYSFUNCTION), RIGHT: Status: RESOLVED | Noted: 2018-07-17 | Resolved: 2020-05-19

## 2020-05-19 PROCEDURE — 99214 OFFICE O/P EST MOD 30 MIN: CPT | Mod: 95 | Performed by: NURSE PRACTITIONER

## 2020-05-19 RX ORDER — LABETALOL 100 MG/1
300 TABLET, FILM COATED ORAL 2 TIMES DAILY
Qty: 540 TABLET | Refills: 1 | Status: SHIPPED | OUTPATIENT
Start: 2020-05-19 | End: 2020-11-23

## 2020-05-19 RX ORDER — LOSARTAN POTASSIUM 50 MG/1
50 TABLET ORAL DAILY
Qty: 90 TABLET | Refills: 2 | Status: SHIPPED | OUTPATIENT
Start: 2020-05-19 | End: 2021-02-23

## 2020-05-19 RX ORDER — HYDROCHLOROTHIAZIDE 12.5 MG/1
CAPSULE ORAL
Qty: 90 CAPSULE | Refills: 2 | Status: SHIPPED | OUTPATIENT
Start: 2020-05-19 | End: 2021-02-23

## 2020-05-19 NOTE — PATIENT INSTRUCTIONS
Patient Education     Prevention Guidelines, Women Ages 50 to 64  Screening tests and vaccines are an important part of managing your health. A screening test is done to find possible disorders or diseases in people who don't have any symptoms. The goal is to find a disease early so lifestyle changes can be made and you can be watched more closely to reduce the risk of disease, or to detect it early enough to treat it most effectively. Screening tests are not considered diagnostic, but are used to determine if more testing is needed. Health counseling is essential, too. Below are guidelines for these, for women ages 50 to 64. Talk with your healthcare provider to make sure you re up to date on what you need.  Screening Who needs it How often   Type 2 diabetes or prediabetes All women beginning at age 45 and women without symptoms at any age who are overweight or obese and have 1 or more additional risk factors for diabetes. At  least every 3 years   Type 2 diabetes or prediabetes All women diagnosed with gestational diabetes Lifelong testing every 3 years   Type 2 diabetes All women with prediabetes Every year   Alcohol misuse All women in this age group At routine exams   Blood pressure All women in this age group Yearly checkup if your blood pressure is normal  Normal blood pressure is less than 120/80 mm Hg  If your blood pressure reading is higher than normal, follow the advice of your healthcare provider   Breast cancer All women at average risk in this age group Yearly mammogram should be done until age 54. At age 55, you can switch to every other year or choose to continue yearly.  All women should know the possible benefits and risks of breast cancer screening with mammograms.   Cervical cancer All women in this age group, except women who have had a complete hysterectomy Pap test every 3 years or Pap test with human papillomavirus (HPV) test every 5 years   Chlamydia Women at increased risk for infection  At routine exams   Colorectal cancer All women at average risk in this age group Multiple tests are available and are used at different times. Possible tests include:    Flexible sigmoidoscopy every 5 years, or    Colonoscopy every 10 years, or    CT colonography (virtual colonoscopy) every 5 years, or    Yearly fecal occult blood test, or    Yearly fecal immunochemical test every year, or    Stool DNA test, every 3 years  If you choose a test other than a colonoscopy and have an abnormal test result, you will need to follow up with a colonoscopy. Screening advice varies among expert groups. Talk with your healthcare provider about which tests are best for you.  Some people should be screened using a different schedule because of their personal or family health history. Talk with your healthcare provider about your health history.   Depression All women in this age group At routine exams   Gonorrhea Sexually active women at increased risk for infection At routine exams   Hepatitis C Anyone at increased risk; 1 time for those born between 1945 and 1965 At routine exams   High cholesterol or triglycerides All women in this age group who are at risk for coronary artery disease At least every 5 years   HIV All women At routine exams   Lung cancer Adults age 55 to 80 who have smoked Yearly screening in smokers with 30 pack-year history of smoking or who quit within 15 years   Obesity All women in this age group At routine exams   Osteoporosis Women who are postmenopausal Ask your healthcare provider   Syphilis Women at increased risk for infection - talk with your healthcare provider At routine exams   Tuberculosis Women at increased risk for infection - talk with your healthcare provider Ask your healthcare provider   Vision All women in this age group Ask your healthcare provider   Vaccine Who needs it How often   Chickenpox (varicella) All women in this age group who have no record of this infection or vaccine 2  doses; the second dose should be given at least 4 weeks after the first dose   Hepatitis A Women at increased risk for infection - talk with your healthcare provider 2 doses given at least 6 months apart   Hepatitis B Women at increased risk for infection - talk with your healthcare provider 3 doses over 6 months; second dose should be given 1 month after the first dose; the third dose should be given at least 2 months after the second dose and at least 4 months after the first dose   Haemophilus influenzae Type B (HIB) Women at increased risk for infection - talk with your healthcare provider 1 to 3 doses   Influenza (flu) All women in this age group Once a year   Measles, mumps, rubella (MMR) Women in this age group through their late 50s who have no record of these infections or vaccines 1 dose   Meningococcal Women at increased risk for infection - talk with your healthcare provider 1 or more doses   Pneumococcal conjugate vaccine (PCV13) and pneumococcal polysaccharide vaccine (PPSV23) Women at increased risk for infection - talk with your healthcare provider PCV13: 1 dose ages 19 to 65 (protects against 13 types of pneumococcal bacteria)  PPSV23: 1 to 2 doses through age 64, or 1 dose at 65 or older (protects against 23 types of pneumococcal bacteria)   Tetanus/diphtheria/pertussis (Td/Tdap) booster All women in this age group Td every 10 years, or a 1-time dose of Tdap instead of a Td booster after age 18, then Td every 10 years   Zoster All women ages 60 and older 1 dose   Counseling Who needs it How often   BRCA gene mutation testing for breast and ovarian cancer susceptibility Women with increased risk for having gene mutation When your risk is known   Breast cancer and chemoprevention Women at high risk for breast cancer When your risk is known   Diet and exercise Women who are overweight or obese When diagnosed, and then at routine exams   Sexually transmitted infection prevention Women at increased risk  for infection - talk with your healthcare provider At routine exams   Use of daily aspirin Women ages 55 and up in this age group who are at risk for cardiovascular health problems such as stroke When your risk is known   Use of tobacco and the health effects it can cause All women in this age group Every exam   1 [employer]  Date Last Reviewed: 1/26/2016 2000-2019 [employer]. [address] [place] [address]. All rights reserved. This information is not intended as a substitute for professional medical care. Always follow your healthcare professional's instructions.

## 2020-05-19 NOTE — PROGRESS NOTES
"Aretha Hooks is a 64 year old female who is being evaluated via a billable telephone visit.      The patient has been notified of following:     \"This telephone visit will be conducted via a call between you and your physician/provider. We have found that certain health care needs can be provided without the need for a physical exam.  This service lets us provide the care you need with a short phone conversation.  If a prescription is necessary we can send it directly to your pharmacy.  If lab work is needed we can place an order for that and you can then stop by our lab to have the test done at a later time.    Telephone visits are billed at different rates depending on your insurance coverage. During this emergency period, for some insurers they may be billed the same as an in-person visit.  Please reach out to your insurance provider with any questions.    If during the course of the call the physician/provider feels a telephone visit is not appropriate, you will not be charged for this service.\"    Patient has given verbal consent for Telephone visit?  Yes    What phone number would you like to be contacted at? [phone]    How would you like to obtain your AVS? Mail a copy    Subjective     Aretha Hooks is a 64 year old female who presents via phone visit today for the following health issues:    HPI  Hypertension Follow-up      Do you check your blood pressure regularly outside of the clinic? Every six weeks she gives blood and they take her blood pressure.     Are you following a low salt diet? Yes    Are your blood pressures ever more than 140 on the top number (systolic) OR more   than 90 on the bottom number (diastolic), for example 140/90? No    BP Readings from Last 3 Encounters:   10/23/19 132/71   09/28/19 (!) 161/99   08/27/19 110/62       How many servings of fruits and vegetables do you eat daily?  2-3    On average, how many sweetened beverages do you drink each day (Examples: soda, " juice, sweet tea, etc.  Do NOT count diet or artificially sweetened beverages)?   1    How many days per week do you exercise enough to make your heart beat faster? 7    How many minutes a day do you exercise enough to make your heart beat faster? 30 - 60    How many days per week do you miss taking your medication? 0      Hyperlipidemia Follow-Up      Are you regularly taking any medication or supplement to lower your cholesterol?   No    Are you having muscle aches or other side effects that you think could be caused by your cholesterol lowering medication?  No    History of Vitamin B12 deficiency taking Vitamin B12 injections monthly.    Patient Active Problem List   Diagnosis     Essential hypertension     Other specified disorder of rectum and anus     Hypertriglyceridemia     Vitamin B12 deficiency (non anemic)     CARDIOVASCULAR SCREENING; LDL GOAL LESS THAN 130     Tubular adenoma of colon     Brain aneurysm     Sensory disorder     Past Surgical History:   Procedure Laterality Date     C APPENDECTOMY  Age 12    Appendectomy     C LIGATE FALLOPIAN TUBE  1984    Tubal Ligation     COLONOSCOPY N/A 11/10/2017    Procedure: COMBINED COLONOSCOPY, SINGLE OR MULTIPLE BIOPSY/POLYPECTOMY BY BIOPSY;  colonoscopy;  Surgeon: Emil Vaz MD;  Location: [facility]      KNEE SCOPE,MED+LAT MENIS REPAIR  2009    left knee     SURGICAL HISTORY OF -   2004    Cervical dilatation with curettage and hysteroscopy with rollerball endometrial ablation     SURGICAL HISTORY OF -   1/2004    Laparoscopic Cholecystectomy     SURGICAL HISTORY OF -   1988    Cryocautery to cervix for bleeding     SURGICAL HISTORY OF -   2004    endometrial ablation     SURGICAL HISTORY OF -   1/2008    carcinoid tumor rectum  Ruthie Lawson       Social History     Tobacco Use     Smoking status: Passive Smoke Exposure - Never Smoker     Smokeless tobacco: Never Used     Tobacco comment: exposed to 's cigarette smoke    Substance Use Topics     Alcohol use: Yes     Comment: occ     Family History   Problem Relation Age of Onset     Cancer Mother         Renal     Hypertension Father      Respiratory Father         uses cpap     Eye Disorder Father         cataracts     Hypertension Brother      Musculoskeletal Disorder Sister         back surg.     Genitourinary Problems Sister      Cancer Sister         skin cancer     Gynecology Sister         hysterectomy     C.A.D. Paternal Grandmother      Breast Cancer No family hx of      Cancer - colorectal No family hx of      Colon Cancer No family hx of          Current Outpatient Medications   Medication Sig Dispense Refill     CALTRATE 600 + D 600-200 MG-IU OR TABS 2 TABLET DAILY       cyanocobalamin (CYANOCOBALAMIN) 1000 MCG/ML injection Inject 1 mL (1,000 mcg) into the muscle every 30 days 1 mL 7     Fish Oil OIL 1 capsule twice daily - unknown dose       hydrochlorothiazide (MICROZIDE) 12.5 MG capsule TAKE ONE CAPSULE (12.5MG) BY MOUTH EVERY MORNING 90 capsule 2     labetalol (NORMODYNE) 100 MG tablet Take 3 tablets (300 mg) by mouth 2 times daily 540 tablet 1     loratadine (CLARITIN) 10 MG tablet Take 1 tablet (10 mg) by mouth daily 30 tablet 1     losartan (COZAAR) 50 MG tablet Take 1 tablet (50 mg) by mouth daily 90 tablet 2     MULTIVITAMIN TABS   OR 1 TABLET DAILY       NEEDLES, ANY SIZE Use once a month for B12 injection 1 Box 1     Syringe, Disposable, (SYRINGE/CANNULA) 3 ML MISC 3 ml syringe 12 each 1     No Known Allergies  Recent Labs   Lab Test 06/08/19  0959 05/14/19  1151  11/18/15  1531  12/24/12  0816 09/27/12  0906   A1C  --   --   --   --   --  5.1  --    LDL  --  24  --  81  --   --  94   HDL  --  52  --  55  --   --  59   TRIG  --  129  --   --   --   --  113   CR 0.73 0.98   < > 0.82   < >  --   --    GFRESTIMATED 88 62   < > 72   < >  --   --    GFRESTBLACK >90 72   < > 87   < >  --   --    POTASSIUM 3.6 3.2*   < > 3.5   < >  --   --     < > = values in this  interval not displayed.      BP Readings from Last 3 Encounters:   10/23/19 132/71   09/28/19 (!) 161/99   08/27/19 110/62    Wt Readings from Last 3 Encounters:   09/28/19 81.6 kg (180 lb)   07/26/19 79.4 kg (175 lb)   11/10/17 79.4 kg (175 lb)                    Reviewed and updated as needed this visit by Provider  Tobacco  Allergies  Meds  Problems  Med Hx  Surg Hx  Fam Hx         Review of Systems   Constitutional, HEENT, cardiovascular, pulmonary, GI, , musculoskeletal, neuro, skin, endocrine and psych systems are negative, except as otherwise noted.       Objective   Reported vitals:  LMP 04/01/2008    healthy, alert and no distress  PSYCH: Alert and oriented times 3; coherent speech, normal   rate and volume, able to articulate logical thoughts, able   to abstract reason, no tangential thoughts, no hallucinations   or delusions  Her affect is normal and pleasant  RESP: No cough, no audible wheezing, able to talk in full sentences  Remainder of exam unable to be completed due to telephone visits    Diagnostic Test Results:  Labs reviewed in Epic        Assessment/Plan:  1. Essential hypertension with goal blood pressure less than 140/90  Controlled- checking in community and reports blood pressure readings are at goal.  Refilled medication.  Due for labs     - hydrochlorothiazide (MICROZIDE) 12.5 MG capsule; TAKE ONE CAPSULE (12.5MG) BY MOUTH EVERY MORNING  Dispense: 90 capsule; Refill: 2  - labetalol (NORMODYNE) 100 MG tablet; Take 3 tablets (300 mg) by mouth 2 times daily  Dispense: 540 tablet; Refill: 1  - losartan (COZAAR) 50 MG tablet; Take 1 tablet (50 mg) by mouth daily  Dispense: 90 tablet; Refill: 2    2. Vitamin B12 deficiency (non anemic)  Continue - check B12    - Vitamin B12; Future    3. Hypertriglyceridemia     - Lipid panel reflex to direct LDL Fasting; Future    4. Essential hypertension     - Basic metabolic panel; Future    5. CARDIOVASCULAR SCREENING; LDL GOAL LESS THAN 130     -  Lipid panel reflex to direct LDL Fasting; Future    Return in about 6 months (around 11/19/2020) for Physical Exam.      Phone call duration:  13 minutes    Judy Jiménez NP

## 2020-09-29 ENCOUNTER — HOSPITAL ENCOUNTER (OUTPATIENT)
Dept: MAMMOGRAPHY | Facility: CLINIC | Age: 65
Discharge: HOME | End: 2020-09-29
Attending: NURSE PRACTITIONER | Admitting: NURSE PRACTITIONER
Payer: COMMERCIAL

## 2020-09-29 DIAGNOSIS — Z12.31 VISIT FOR SCREENING MAMMOGRAM: ICD-10-CM

## 2020-09-29 PROCEDURE — 77067 SCR MAMMO BI INCL CAD: CPT

## 2020-11-20 DIAGNOSIS — I10 ESSENTIAL HYPERTENSION WITH GOAL BLOOD PRESSURE LESS THAN 140/90: ICD-10-CM

## 2020-11-20 NOTE — TELEPHONE ENCOUNTER
"Requested Prescriptions   Pending Prescriptions Disp Refills     labetalol (NORMODYNE) 100 MG tablet [Pharmacy Med Name: Labetalol HCl Oral Tablet 100 MG] 540 tablet 0     Sig: Take 3 tablets (300 mg) by mouth 2 times daily       Beta-Blockers Protocol Failed - 11/20/2020  2:01 AM        Failed - Blood pressure under 140/90 in past 12 months     BP Readings from Last 3 Encounters:   10/23/19 132/71   09/28/19 (!) 161/99   08/27/19 110/62                 Passed - Patient is age 6 or older        Passed - Recent (12 mo) or future (30 days) visit within the authorizing provider's specialty     Patient has had an office visit with the authorizing provider or a provider within the authorizing providers department within the previous 12 mos or has a future within next 30 days. See \"Patient Info\" tab in inbasket, or \"Choose Columns\" in Meds & Orders section of the refill encounter.              Passed - Medication is active on med list             "

## 2020-11-20 NOTE — TELEPHONE ENCOUNTER
Routing refill request to provider for review/approval because:  BP above protocol limit.  Vi Cadet RN

## 2020-11-23 RX ORDER — LABETALOL 100 MG/1
300 TABLET, FILM COATED ORAL 2 TIMES DAILY
Qty: 540 TABLET | Refills: 0 | Status: SHIPPED | OUTPATIENT
Start: 2020-11-23 | End: 2021-02-23

## 2020-12-22 DIAGNOSIS — I10 ESSENTIAL HYPERTENSION: ICD-10-CM

## 2020-12-22 DIAGNOSIS — Z13.6 CARDIOVASCULAR SCREENING; LDL GOAL LESS THAN 130: ICD-10-CM

## 2020-12-22 DIAGNOSIS — E53.8 VITAMIN B12 DEFICIENCY (NON ANEMIC): ICD-10-CM

## 2020-12-22 DIAGNOSIS — E78.1 HYPERTRIGLYCERIDEMIA: ICD-10-CM

## 2020-12-22 LAB
ANION GAP SERPL CALCULATED.3IONS-SCNC: 6 MMOL/L (ref 3–14)
BUN SERPL-MCNC: 9 MG/DL (ref 7–30)
CALCIUM SERPL-MCNC: 9.5 MG/DL (ref 8.5–10.1)
CHLORIDE SERPL-SCNC: 105 MMOL/L (ref 94–109)
CHOLEST SERPL-MCNC: 159 MG/DL
CO2 SERPL-SCNC: 31 MMOL/L (ref 20–32)
CREAT SERPL-MCNC: 0.74 MG/DL (ref 0.52–1.04)
GFR SERPL CREATININE-BSD FRML MDRD: 86 ML/MIN/{1.73_M2}
GLUCOSE SERPL-MCNC: 98 MG/DL (ref 70–99)
HDLC SERPL-MCNC: 73 MG/DL
LDLC SERPL CALC-MCNC: 63 MG/DL
NONHDLC SERPL-MCNC: 86 MG/DL
POTASSIUM SERPL-SCNC: 3.5 MMOL/L (ref 3.4–5.3)
SODIUM SERPL-SCNC: 142 MMOL/L (ref 133–144)
TRIGL SERPL-MCNC: 115 MG/DL
VIT B12 SERPL-MCNC: 475 PG/ML (ref 193–986)

## 2020-12-22 PROCEDURE — 36415 COLL VENOUS BLD VENIPUNCTURE: CPT | Performed by: NURSE PRACTITIONER

## 2020-12-22 PROCEDURE — 80061 LIPID PANEL: CPT | Performed by: NURSE PRACTITIONER

## 2020-12-22 PROCEDURE — 82607 VITAMIN B-12: CPT | Performed by: NURSE PRACTITIONER

## 2020-12-22 PROCEDURE — 80048 BASIC METABOLIC PNL TOTAL CA: CPT | Performed by: NURSE PRACTITIONER

## 2020-12-22 NOTE — RESULT ENCOUNTER NOTE
All of your lab results are normal.  Awaiting Vitamin B12 testing.    Please notify patient of results.  SELAM Stein

## 2020-12-22 NOTE — LETTER
December 23, 2020      Aretha Hooks  [address]  [place] [address]        Dear MsJessee,    We are writing to inform you of your test results.    Covering for Judy:   Vitamin B12 levels are normal.   Autumn Cedeno CNP  For Judy Jiménez NP  Component      Latest Ref Rng & Units 12/22/2020   Vitamin B12      193 - 986 pg/mL 475       If you have any questions or concerns, please call the clinic at the number listed above.       Sincerely,      Judy Jiménez NP

## 2020-12-23 DIAGNOSIS — E53.8 VITAMIN B12 DEFICIENCY (NON ANEMIC): ICD-10-CM

## 2020-12-23 NOTE — TELEPHONE ENCOUNTER
"Requested Prescriptions   Pending Prescriptions Disp Refills     cyanocobalamin (CYANOCOBALAMIN) 1000 MCG/ML injection [Pharmacy Med Name: Cyanocobalamin Injection Solution 1000 MCG/ML] 1 mL 0     Sig: Inject 1 mL (1,000 mcg) into the muscle every 30 days       Vitamin Supplements (Adult) Protocol Passed - 12/23/2020  2:01 AM        Passed - High dose Vitamin D not ordered        Passed - Recent (12 mo) or future (30 days) visit within the authorizing provider's specialty     Patient has had an office visit with the authorizing provider or a provider within the authorizing providers department within the previous 12 mos or has a future within next 30 days. See \"Patient Info\" tab in inbasket, or \"Choose Columns\" in Meds & Orders section of the refill encounter.              Passed - Medication is active on med list           "

## 2020-12-24 RX ORDER — CYANOCOBALAMIN 1000 UG/ML
1 INJECTION, SOLUTION INTRAMUSCULAR; SUBCUTANEOUS
Qty: 1 ML | Refills: 1 | Status: SHIPPED | OUTPATIENT
Start: 2020-12-24 | End: 2021-02-23

## 2021-01-03 ENCOUNTER — MYC MEDICAL ADVICE (OUTPATIENT)
Dept: ADMISSION | Facility: CLINIC | Age: 66
End: 2021-01-03

## 2021-01-04 ENCOUNTER — TRANSFERRED RECORDS (OUTPATIENT)
Dept: HEALTH INFORMATION MANAGEMENT | Facility: CLINIC | Age: 66
End: 2021-01-04

## 2021-01-04 NOTE — TELEPHONE ENCOUNTER
Pt states she spoke to someone named paola who said they received the forms and now waiting for provider to complete them so she can be off January.would like to discuss

## 2021-01-05 NOTE — TELEPHONE ENCOUNTER
Saw the message these forms are not for [facility] they are for Chaney the Husbands PCP. I called Alice back and asked her the questions that where needed for the [facility] time off for husbands doctors appointments.      The date the start to new one January 1, 2021 til Thru March 31, 2021. (3 months)    This time it will be Intermittent. She is think about 2 times a week. The time off is for appointments outside of the house.     Routing this to Olena HERNANDEZ The person that does our forms.    Alejandrina Macias on 1/5/2021 at 3:57 PM

## 2021-01-06 ENCOUNTER — HOSPITAL ENCOUNTER (OUTPATIENT)
Dept: MRI IMAGING | Facility: CLINIC | Age: 66
Discharge: HOME | End: 2021-01-06
Attending: NURSE PRACTITIONER | Admitting: NURSE PRACTITIONER
Payer: COMMERCIAL

## 2021-01-06 DIAGNOSIS — M54.50 ACUTE LOW BACK PAIN: ICD-10-CM

## 2021-01-06 PROCEDURE — 72148 MRI LUMBAR SPINE W/O DYE: CPT

## 2021-01-11 ENCOUNTER — TRANSFERRED RECORDS (OUTPATIENT)
Dept: HEALTH INFORMATION MANAGEMENT | Facility: CLINIC | Age: 66
End: 2021-01-11

## 2021-02-16 DIAGNOSIS — E53.8 VITAMIN B12 DEFICIENCY (NON ANEMIC): ICD-10-CM

## 2021-02-16 DIAGNOSIS — I10 ESSENTIAL HYPERTENSION WITH GOAL BLOOD PRESSURE LESS THAN 140/90: ICD-10-CM

## 2021-02-16 NOTE — TELEPHONE ENCOUNTER
"Requested Prescriptions   Pending Prescriptions Disp Refills     losartan (COZAAR) 50 MG tablet [Pharmacy Med Name: Losartan Potassium Oral Tablet 50 MG] 90 tablet 0     Sig: Take 1 tablet (50 mg) by mouth daily       Angiotensin-II Receptors Failed - 2/16/2021  2:00 AM        Failed - Last blood pressure under 140/90 in past 12 months     BP Readings from Last 3 Encounters:   10/23/19 132/71   09/28/19 (!) 161/99 08/27/19 110/62                 Passed - Recent (12 mo) or future (30 days) visit within the authorizing provider's specialty     Patient has had an office visit with the authorizing provider or a provider within the authorizing providers department within the previous 12 mos or has a future within next 30 days. See \"Patient Info\" tab in inbasket, or \"Choose Columns\" in Meds & Orders section of the refill encounter.              Passed - Medication is active on med list        Passed - Patient is age 18 or older        Passed - No active pregnancy on record        Passed - Normal serum creatinine on file in past 12 months     Recent Labs   Lab Test 12/22/20  0713   CR 0.74       Ok to refill medication if creatinine is low          Passed - Normal serum potassium on file in past 12 months     Recent Labs   Lab Test 12/22/20  0713   POTASSIUM 3.5                    Passed - No positive pregnancy test in past 12 months           hydrochlorothiazide (MICROZIDE) 12.5 MG capsule [Pharmacy Med Name: hydroCHLOROthiazide Oral Capsule 12.5 MG] 90 capsule 0     Sig: TAKE ONE CAPSULE (12.5MG) BY MOUTH EVERY MORNING       Diuretics (Including Combos) Protocol Failed - 2/16/2021  2:00 AM        Failed - Blood pressure under 140/90 in past 12 months     BP Readings from Last 3 Encounters:   10/23/19 132/71   09/28/19 (!) 161/99 08/27/19 110/62                 Passed - Recent (12 mo) or future (30 days) visit within the authorizing provider's specialty     Patient has had an office visit with the authorizing provider " "or a provider within the authorizing providers department within the previous 12 mos or has a future within next 30 days. See \"Patient Info\" tab in inbasket, or \"Choose Columns\" in Meds & Orders section of the refill encounter.              Passed - Medication is active on med list        Passed - Patient is age 18 or older        Passed - No active pregancy on record        Passed - Normal serum creatinine on file in past 12 months     Recent Labs   Lab Test 12/22/20 0713   CR 0.74              Passed - Normal serum potassium on file in past 12 months     Recent Labs   Lab Test 12/22/20  0713   POTASSIUM 3.5                    Passed - Normal serum sodium on file in past 12 months     Recent Labs   Lab Test 12/22/20  0713                 Passed - No positive pregnancy test in past 12 months           labetalol (NORMODYNE) 100 MG tablet [Pharmacy Med Name: Labetalol HCl Oral Tablet 100 MG] 540 tablet 0     Sig: Take 3 tablets (300 mg) by mouth 2 times daily       Beta-Blockers Protocol Failed - 2/16/2021  2:00 AM        Failed - Blood pressure under 140/90 in past 12 months     BP Readings from Last 3 Encounters:   10/23/19 132/71   09/28/19 (!) 161/99   08/27/19 110/62                 Passed - Patient is age 6 or older        Passed - Recent (12 mo) or future (30 days) visit within the authorizing provider's specialty     Patient has had an office visit with the authorizing provider or a provider within the authorizing providers department within the previous 12 mos or has a future within next 30 days. See \"Patient Info\" tab in inbasket, or \"Choose Columns\" in Meds & Orders section of the refill encounter.              Passed - Medication is active on med list           cyanocobalamin (CYANOCOBALAMIN) 1000 MCG/ML injection [Pharmacy Med Name: Cyanocobalamin Injection Solution 1000 MCG/ML] 1 mL 0     Sig: Inject 1 mL (1,000 mcg) into the muscle every 30 days       Vitamin Supplements (Adult) Protocol Passed - " "2/16/2021  2:00 AM        Passed - High dose Vitamin D not ordered        Passed - Recent (12 mo) or future (30 days) visit within the authorizing provider's specialty     Patient has had an office visit with the authorizing provider or a provider within the authorizing providers department within the previous 12 mos or has a future within next 30 days. See \"Patient Info\" tab in inbasket, or \"Choose Columns\" in Meds & Orders section of the refill encounter.              Passed - Medication is active on med list             "

## 2021-02-16 NOTE — LETTER
February 26, 2021      Aretha Hooks  [id number]  [facility] [id number]        Dear Aretha,     We received a refill request for your Cyanocobalamin, Labetalol, Hydrochlorothiazide, and Losartan medications.  This medication has been refilled for 90 days as you are due for an in person office visit/ blood pressure check for further refills.  Please call [phone] to schedule an appointment.        Sincerely,        Judy Jiménez NP

## 2021-02-22 NOTE — TELEPHONE ENCOUNTER
Routing refill request to provider for review/approval because:  Elevated B/P on 9/28/19.  Advise.  KpavAmbar

## 2021-02-23 RX ORDER — LABETALOL 100 MG/1
300 TABLET, FILM COATED ORAL 2 TIMES DAILY
Qty: 540 TABLET | Refills: 0 | Status: SHIPPED | OUTPATIENT
Start: 2021-02-23 | End: 2021-04-20

## 2021-02-23 RX ORDER — CYANOCOBALAMIN 1000 UG/ML
1 INJECTION, SOLUTION INTRAMUSCULAR; SUBCUTANEOUS
Qty: 1 ML | Refills: 0 | Status: SHIPPED | OUTPATIENT
Start: 2021-02-23 | End: 2021-03-29

## 2021-02-23 RX ORDER — LOSARTAN POTASSIUM 50 MG/1
50 TABLET ORAL DAILY
Qty: 90 TABLET | Refills: 0 | Status: SHIPPED | OUTPATIENT
Start: 2021-02-23 | End: 2021-04-20

## 2021-02-23 RX ORDER — HYDROCHLOROTHIAZIDE 12.5 MG/1
CAPSULE ORAL
Qty: 90 CAPSULE | Refills: 0 | Status: SHIPPED | OUTPATIENT
Start: 2021-02-23 | End: 2021-04-20

## 2021-03-25 DIAGNOSIS — E53.8 VITAMIN B12 DEFICIENCY (NON ANEMIC): ICD-10-CM

## 2021-03-25 NOTE — TELEPHONE ENCOUNTER
"Requested Prescriptions   Pending Prescriptions Disp Refills     cyanocobalamin (CYANOCOBALAMIN) 1000 MCG/ML injection [Pharmacy Med Name: Cyanocobalamin Injection Solution 1000 MCG/ML] 1 mL 0     Sig: Inject 1 mL (1,000 mcg) into the muscle every 30 days       Vitamin Supplements (Adult) Protocol Passed - 3/25/2021  2:00 AM        Passed - High dose Vitamin D not ordered        Passed - Recent (12 mo) or future (30 days) visit within the authorizing provider's specialty     Patient has had an office visit with the authorizing provider or a provider within the authorizing providers department within the previous 12 mos or has a future within next 30 days. See \"Patient Info\" tab in inbasket, or \"Choose Columns\" in Meds & Orders section of the refill encounter.              Passed - Medication is active on med list             "

## 2021-03-29 RX ORDER — CYANOCOBALAMIN 1000 UG/ML
1 INJECTION, SOLUTION INTRAMUSCULAR; SUBCUTANEOUS
Qty: 1 ML | Refills: 0 | Status: SHIPPED | OUTPATIENT
Start: 2021-03-29 | End: 2021-04-20

## 2021-04-20 ENCOUNTER — OFFICE VISIT (OUTPATIENT)
Dept: FAMILY MEDICINE | Facility: CLINIC | Age: 66
End: 2021-04-20
Payer: COMMERCIAL

## 2021-04-20 VITALS
OXYGEN SATURATION: 99 % | TEMPERATURE: 98 F | SYSTOLIC BLOOD PRESSURE: 128 MMHG | RESPIRATION RATE: 16 BRPM | DIASTOLIC BLOOD PRESSURE: 78 MMHG | HEART RATE: 72 BPM

## 2021-04-20 DIAGNOSIS — F43.23 SITUATIONAL MIXED ANXIETY AND DEPRESSIVE DISORDER: ICD-10-CM

## 2021-04-20 DIAGNOSIS — I10 ESSENTIAL HYPERTENSION WITH GOAL BLOOD PRESSURE LESS THAN 140/90: Primary | ICD-10-CM

## 2021-04-20 DIAGNOSIS — E53.8 VITAMIN B12 DEFICIENCY (NON ANEMIC): ICD-10-CM

## 2021-04-20 PROCEDURE — 99214 OFFICE O/P EST MOD 30 MIN: CPT | Performed by: NURSE PRACTITIONER

## 2021-04-20 RX ORDER — LABETALOL 100 MG/1
300 TABLET, FILM COATED ORAL 2 TIMES DAILY
Qty: 540 TABLET | Refills: 3 | Status: SHIPPED | OUTPATIENT
Start: 2021-04-20 | End: 2022-02-01

## 2021-04-20 RX ORDER — CYANOCOBALAMIN 1000 UG/ML
1 INJECTION, SOLUTION INTRAMUSCULAR; SUBCUTANEOUS
Qty: 1 ML | Refills: 11 | Status: SHIPPED | OUTPATIENT
Start: 2021-04-20 | End: 2022-02-01

## 2021-04-20 RX ORDER — LOSARTAN POTASSIUM 50 MG/1
50 TABLET ORAL DAILY
Qty: 90 TABLET | Refills: 3 | Status: SHIPPED | OUTPATIENT
Start: 2021-04-20 | End: 2022-02-01

## 2021-04-20 RX ORDER — HYDROCHLOROTHIAZIDE 12.5 MG/1
12.5 CAPSULE ORAL EVERY MORNING
Qty: 90 CAPSULE | Refills: 3 | Status: SHIPPED | OUTPATIENT
Start: 2021-04-20 | End: 2022-02-01

## 2021-04-20 NOTE — PROGRESS NOTES
Assessment & Plan     Essential hypertension with goal blood pressure less than 140/90  Controlled.  Refilled.  Labs    - labetalol (NORMODYNE) 100 MG tablet  Dispense: 540 tablet; Refill: 3  - losartan (COZAAR) 50 MG tablet  Dispense: 90 tablet; Refill: 3  - hydrochlorothiazide (MICROZIDE) 12.5 MG capsule  Dispense: 90 capsule; Refill: 3    Vitamin B12 deficiency (non anemic)   Refilled.  Reviewed labs.    - cyanocobalamin (CYANOCOBALAMIN) 1000 MCG/ML injection  Dispense: 1 mL; Refill: 11    Situational mixed anxiety and depressive disorder  Discussed care giving, anxiety, prevention methods  Given information on [facility] [place] for mental health counseling.  Discussed sleep concerns - declined treatment at this time.  Follow up if not improving        30 minutes spent on the date of the encounter doing chart review, history and exam, documentation and further activities per the note       Patient Instructions     Patient Education     Established High Blood Pressure    High blood pressure (hypertension) is a long-term (chronic) disease. Often healthcare providers don t know what causes it. But it can be caused by certain health conditions and medicines.  If you have high blood pressure, you may not have any symptoms. If you do have symptoms, they may include:    Headache    Dizziness    Changes in your vision    Chest pain    Shortness of breath  But even without symptoms, high blood pressure that s not treated raises your risk for heart attack, heart failure, kidney disease, and stroke. High blood pressure is a serious health risk and shouldn t be ignored.  Blood pressure measurements are given as 2 numbers. Systolic blood pressure is the upper number. This is the pressure when the heart contracts. Diastolic blood pressure is the lower number. This is the pressure when the heart relaxes between beats. You will see your blood pressure readings written together. For example, a person with a systolic pressure of  118 and a diastolic pressure of 78 will have 118/78 written in the medical record.  Blood pressure is classified as normal, raised (elevated) or stage 1 or stage 2 high blood pressure:    Normal blood pressure. Systolic of less than 120 and diastolic of less than 80 (120/80).    Elevated blood pressure. Systolic of 120 to 129 and diastolic less than 80.    Stage 1 high blood pressure. Systolic is 130 to 139 or diastolic between 80 to 89.    Stage 2 high blood pressure. Systolic is 140 or higher or the diastolic is 90 or higher.  Home care  If you have high blood pressure, follow these home care guidelines to help lower your blood pressure. If you are taking medicines for high blood pressure, these methods may reduce or end your need for medicines in the future.    Start a weight-loss program if you are overweight.    Cut back on how much salt you get in your diet. Here s how to do this:  ? Don t eat foods that have a lot of salt. These include olives, pickles, smoked meats, and salted potato chips.  ? Don t add salt to your food at the table.  ? Use only small amounts of salt when cooking.    Start an exercise program. Talk with your healthcare provider about the type of exercise program that would be best for you. It doesn't have to be hard. Even brisk walking for 20 minutes 3 times a week is a good form of exercise.    Don t take medicines that stimulate the heart. This includes many over-the-counter cold and sinus decongestant pills and sprays, as well as diet pills. Check the warnings about high blood pressure on the label. Before buying any over-the-counter medicines or supplements, always ask the pharmacist about the product's possible interaction with your high blood pressure and your high blood pressure medicines.    Stimulants such as amphetamine or cocaine could be deadly for someone with high blood pressure. Never take these.    Limit how much caffeine you get in your diet. Switch to caffeine-free  products.    Stop smoking. If you are a long-time smoker, this can be hard. Talk with your healthcare provider about medicines and nicotine replacement options to help you. Also join a stop-smoking program . This makes it more likely that you will quit for good.    Learn how to handle stress. This is an important part of any program to lower blood pressure. Learn about relaxation methods such as meditation, yoga, or biofeedback.    If your provider prescribed medicines, take them exactly as directed. Missing doses may cause your blood pressure get out of control.    If you miss a dose, check with your healthcare provider or pharmacist about what to do.    Think about buying an automatic blood pressure machine to check your blood pressure at home. Ask your provider for a recommendation. You can get one of these at most pharmacies.  Using a home blood pressure monitor  The American Heart Association advises the following guidelines for home blood pressure monitoring:    Don't smoke or drink coffee for 30 minutes before taking your blood pressure.    Go to the bathroom before the test.    Relax for 5 minutes before taking the measurement.    Sit with your back supported (don't sit on a couch or soft chair). Keep your feet on the floor uncrossed. Place your arm on a solid flat surface (such as a table) with the upper part of the arm at heart level. Place the middle of the cuff directly above the bend of the elbow. Check the monitor's instruction manual for an illustration.    Take multiple readings. When you measure, take 2 to 3 readings one minute apart and record all of the results.    Take your blood pressure at the same time every day, or as your healthcare provider advises.    Record the date, time, and blood pressure reading.    Take the record with you to your next healthcare appointment. If your blood pressure monitor has a built-in memory, just take the monitor with you to your next appointment.    Call your  provider if you have several high readings. Don't be frightened by one high blood pressure reading. But if you get a few high readings, check in with your healthcare provider.  Follow-up care  You will need to see your healthcare provider regularly. This is to check your blood pressure and to make changes to your medicines. Make a follow-up appointment as directed. Bring the record of your home blood pressure readings to the appointment.  Call 911  Call 911 if you have any of these:    Blood pressure of 180/120 or higher    Chest pain or shortness of breath    Weakness of an arm or leg or one side of the face    Problems speaking or seeing     When to get medical advice  Call your healthcare provider right away if any of these occur:    Severe headache    Throbbing or rushing sound in the ears    Nosebleed    Sudden severe pain in your belly (abdomen)    Extreme drowsiness, confusion, or fainting    Dizziness or spinning feeling (vertigo)  [employer] last reviewed this educational content on 7/1/2019 2000-2021 [employer]. All rights reserved. This information is not intended as a substitute for professional medical care. Always follow your healthcare professional's instructions.               Return in about 4 weeks (around 5/18/2021) for Recheck symptoms.    Judy Jiménez NP  [facility]    Carlos Jenkins is a 65 year old who presents for the following health issues  accompanied by her self:    HPI     Hypertension Follow-up      Do you check your blood pressure regularly outside of the clinic? Yes     Are you following a low salt diet? Yes    Are your blood pressures ever more than 140 on the top number (systolic) OR more   than 90 on the bottom number (diastolic), for example 140/90? No      How many servings of fruits and vegetables do you eat daily?  2-3    On average, how many sweetened beverages do you drink each day (Examples: soda, juice, sweet tea, etc.  Do  NOT count diet or artificially sweetened beverages)?   1    How many days per week do you exercise enough to make your heart beat faster? 5    How many minutes a day do you exercise enough to make your heart beat faster? 30 - 60    How many days per week do you miss taking your medication? 0    Discussed recent care giving concerns -  was hospitalized for 6 weeks due to lung condition.  Rehabilitating at home vs TCU due to COVID concerns.  Working full time and this is causing a large amount of stress.  Reports constant fatigue. Anxiety and sleep concerns - most due to above situational concerns.  NO Si today - no history of depression/anxiety.        Review of Systems   Constitutional, HEENT, cardiovascular, pulmonary, GI, , musculoskeletal, neuro, skin, endocrine and psych systems are negative, except as otherwise noted.      Objective    /78   Pulse 72   Temp 98  F (36.7  C) (Tympanic)   Resp 16   LMP 04/01/2008   SpO2 99%   There is no height or weight on file to calculate BMI.  Physical Exam   GENERAL: healthy, alert and no distress  NECK: no adenopathy, no asymmetry, masses, or scars and thyroid normal to palpation  RESP: lungs clear to auscultation - no rales, rhonchi or wheezes  CV: regular rate and rhythm, normal S1 S2, no S3 or S4, no murmur, click or rub, no peripheral edema and peripheral pulses strong  ABDOMEN: soft, nontender, no hepatosplenomegaly, no masses and bowel sounds normal  MS: no gross musculoskeletal defects noted, no edema  PSYCH: mentation appears normal, affect normal/bright, tearful and anxious

## 2021-04-20 NOTE — PATIENT INSTRUCTIONS
Patient Education     Established High Blood Pressure    High blood pressure (hypertension) is a long-term (chronic) disease. Often healthcare providers don t know what causes it. But it can be caused by certain health conditions and medicines.  If you have high blood pressure, you may not have any symptoms. If you do have symptoms, they may include:    Headache    Dizziness    Changes in your vision    Chest pain    Shortness of breath  But even without symptoms, high blood pressure that s not treated raises your risk for heart attack, heart failure, kidney disease, and stroke. High blood pressure is a serious health risk and shouldn t be ignored.  Blood pressure measurements are given as 2 numbers. Systolic blood pressure is the upper number. This is the pressure when the heart contracts. Diastolic blood pressure is the lower number. This is the pressure when the heart relaxes between beats. You will see your blood pressure readings written together. For example, a person with a systolic pressure of 118 and a diastolic pressure of 78 will have 118/78 written in the medical record.  Blood pressure is classified as normal, raised (elevated) or stage 1 or stage 2 high blood pressure:    Normal blood pressure. Systolic of less than 120 and diastolic of less than 80 (120/80).    Elevated blood pressure. Systolic of 120 to 129 and diastolic less than 80.    Stage 1 high blood pressure. Systolic is 130 to 139 or diastolic between 80 to 89.    Stage 2 high blood pressure. Systolic is 140 or higher or the diastolic is 90 or higher.  Home care  If you have high blood pressure, follow these home care guidelines to help lower your blood pressure. If you are taking medicines for high blood pressure, these methods may reduce or end your need for medicines in the future.    Start a weight-loss program if you are overweight.    Cut back on how much salt you get in your diet. Here s how to do this:  ? Don t eat foods that have a  lot of salt. These include olives, pickles, smoked meats, and salted potato chips.  ? Don t add salt to your food at the table.  ? Use only small amounts of salt when cooking.    Start an exercise program. Talk with your healthcare provider about the type of exercise program that would be best for you. It doesn't have to be hard. Even brisk walking for 20 minutes 3 times a week is a good form of exercise.    Don t take medicines that stimulate the heart. This includes many over-the-counter cold and sinus decongestant pills and sprays, as well as diet pills. Check the warnings about high blood pressure on the label. Before buying any over-the-counter medicines or supplements, always ask the pharmacist about the product's possible interaction with your high blood pressure and your high blood pressure medicines.    Stimulants such as amphetamine or cocaine could be deadly for someone with high blood pressure. Never take these.    Limit how much caffeine you get in your diet. Switch to caffeine-free products.    Stop smoking. If you are a long-time smoker, this can be hard. Talk with your healthcare provider about medicines and nicotine replacement options to help you. Also join a stop-smoking program . This makes it more likely that you will quit for good.    Learn how to handle stress. This is an important part of any program to lower blood pressure. Learn about relaxation methods such as meditation, yoga, or biofeedback.    If your provider prescribed medicines, take them exactly as directed. Missing doses may cause your blood pressure get out of control.    If you miss a dose, check with your healthcare provider or pharmacist about what to do.    Think about buying an automatic blood pressure machine to check your blood pressure at home. Ask your provider for a recommendation. You can get one of these at most pharmacies.  Using a home blood pressure monitor  The American Heart Association advises the following  guidelines for home blood pressure monitoring:    Don't smoke or drink coffee for 30 minutes before taking your blood pressure.    Go to the bathroom before the test.    Relax for 5 minutes before taking the measurement.    Sit with your back supported (don't sit on a couch or soft chair). Keep your feet on the floor uncrossed. Place your arm on a solid flat surface (such as a table) with the upper part of the arm at heart level. Place the middle of the cuff directly above the bend of the elbow. Check the monitor's instruction manual for an illustration.    Take multiple readings. When you measure, take 2 to 3 readings one minute apart and record all of the results.    Take your blood pressure at the same time every day, or as your healthcare provider advises.    Record the date, time, and blood pressure reading.    Take the record with you to your next healthcare appointment. If your blood pressure monitor has a built-in memory, just take the monitor with you to your next appointment.    Call your provider if you have several high readings. Don't be frightened by one high blood pressure reading. But if you get a few high readings, check in with your healthcare provider.  Follow-up care  You will need to see your healthcare provider regularly. This is to check your blood pressure and to make changes to your medicines. Make a follow-up appointment as directed. Bring the record of your home blood pressure readings to the appointment.  Call 911  Call 911 if you have any of these:    Blood pressure of 180/120 or higher    Chest pain or shortness of breath    Weakness of an arm or leg or one side of the face    Problems speaking or seeing     When to get medical advice  Call your healthcare provider right away if any of these occur:    Severe headache    Throbbing or rushing sound in the ears    Nosebleed    Sudden severe pain in your belly (abdomen)    Extreme drowsiness, confusion, or fainting    Dizziness or spinning  feeling (vertigo)  [employer] last reviewed this educational content on 7/1/2019 2000-2021 [employer]. All rights reserved. This information is not intended as a substitute for professional medical care. Always follow your healthcare professional's instructions.

## 2021-04-20 NOTE — NURSING NOTE
"Initial /78   Pulse 72   Temp 98  F (36.7  C) (Tympanic)   Resp 16   LMP 04/01/2008   SpO2 99%  Estimated body mass index is 29.95 kg/m  as calculated from the following:    Height as of 9/28/19: 1.651 m (5' 5\").    Weight as of 9/28/19: 81.6 kg (180 lb). .    Katarina Nassar, AGUSTO  r  "

## 2021-05-06 ENCOUNTER — TRANSFERRED RECORDS (OUTPATIENT)
Dept: HEALTH INFORMATION MANAGEMENT | Facility: CLINIC | Age: 66
End: 2021-05-06

## 2021-10-28 ENCOUNTER — VIRTUAL VISIT (OUTPATIENT)
Dept: FAMILY MEDICINE | Facility: CLINIC | Age: 66
End: 2021-10-28
Payer: COMMERCIAL

## 2021-10-28 DIAGNOSIS — R05.9 COUGH: Primary | ICD-10-CM

## 2021-10-28 PROCEDURE — 99213 OFFICE O/P EST LOW 20 MIN: CPT | Mod: 95 | Performed by: NURSE PRACTITIONER

## 2021-10-28 NOTE — PATIENT INSTRUCTIONS
"Discharge Instructions for COVID-19 Patients  You have--or may have--COVID-19. Please follow the instructions listed below.   If you have a weakened immune system, discuss with your doctor any other actions you need to take.  How can I protect others?  If you have symptoms (fever, cough, body aches or trouble breathing):    Stay home and away from others (self-isolate) until:  ? Your other symptoms have resolved (gotten better). And   ? You've had no fever--and no medicine that reduces fever--for 1 full day (24 hours). And   ? At least 10 days have passed since your symptoms started. (You may need to wait 20 days. Follow the advice of your care team.)  If you don't show symptoms, but testing showed that you have COVID-19:    Stay home and away from others (self-isolate) until at least 10 days have passed since the date of your first positive COVID-19 test.  During this time    Stay in your own room, even for meals. Use your own bathroom if you can.    Stay away from others in your home. No hugging, kissing or shaking hands. No visitors.    Don't go to work, school or anywhere else.    Clean \"high touch\" surfaces often (doorknobs, counters, handles). Use household cleaning spray or wipes.    You'll find a full list of  on the EPA website: www.epa.gov/pesticide-registration/list-n-disinfectants-use-against-sars-cov-2.    Cover your mouth and nose with a mask or other face covering to avoid spreading germs.    Wash your hands and face often. Use soap and water.    Caregivers in these groups are at risk for severe illness due to COVID-19:  ? People 65 years and older  ? People who live in a nursing home or long-term care facility  ? People with chronic disease (lung, heart, cancer, diabetes, kidney, liver, immunologic)  ? People who have a weakened immune system, including those who:    Are in cancer treatment    Take medicine that weakens the immune system, such as corticosteroids    Had a bone marrow or organ " transplant    Have an immune deficiency    Have poorly controlled HIV or AIDS    Are obese (body mass index of 40 or higher)    Smoke regularly    Caregivers should wear gloves while washing dishes, handling laundry and cleaning bedrooms and bathrooms.    Use caution when washing and drying laundry: Don't shake dirty laundry and use the warmest water setting that you can.    For more tips on managing your health at home, go to www.cdc.gov/coronavirus/2019-ncov/downloads/10Things.pdf.  How can I take care of myself at home?  1. Get lots of rest. Drink extra fluids (unless a doctor has told you not to).  2. Take Tylenol (acetaminophen) for fever or pain. If you have liver or kidney problems, ask your family doctor if it's okay to take Tylenol.   Adults can take either:   ? 650 mg (two 325 mg pills) every 4 to 6 hours, or   ? 1,000 mg (two 500 mg pills) every 8 hours as needed.  ? Note: Don't take more than 3,000 mg in one day. Acetaminophen is found in many medicines (both prescribed and over-the-counter medicines). Read all labels to be sure you don't take too much.   For children, check the Tylenol bottle for the right dose. The dose is based on the child's age or weight.  3. If you have other health problems (like cancer, heart failure, an organ transplant or severe kidney disease): Call your specialty clinic if you don't feel better in the next 2 days.  4. Know when to call 911. Emergency warning signs include:  ? Trouble breathing or shortness of breath  ? Pain or pressure in the chest that doesn't go away  ? Feeling confused like you haven't felt before, or not being able to wake up  ? Bluish-colored lips or face  5. Your doctor may have prescribed a blood thinner medicine. Follow their instructions.  Where can I get more information?     [facility] - About COVID-19:   [email or web address]    CDC - What to Do If You're Sick:  www.cdc.gov/coronavirus/2019-ncov/about/steps-when-sick.html    CDC - Ending Home Isolation: www.cdc.gov/coronavirus/2019-ncov/hcp/disposition-in-home-patients.html    CDC - Caring for Someone: www.cdc.gov/coronavirus/2019-ncov/if-you-are-sick/care-for-someone.html    [facility] - Interim Guidance for Hospital Discharge to Home: www.health.[facility].[place]./diseases/coronavirus/hcp/hospdischarge.pdf    Below are the COVID-19 hotlines at the [place] Department of Health ([facility]). Interpreters are available.  ? For health questions: Call 387-571-3724 or 1-599.764.4410 (7 a.m. to 7 p.m.)  ? For questions about schools and childcare: Call 519-095-3299 or 1-942.205.5765 (7 a.m. to 7 p.m.)    For informational purposes only. Not to replace the advice of your health care provider. Clinically reviewed by Dr. Toni Nunez.   Copyright   2020 [facility]. All rights reserved. [employer] [id number] - REV 01/05/21.

## 2021-10-28 NOTE — PROGRESS NOTES
"Alice is a 65 year old who is being evaluated via a billable telephone visit.      What phone number would you like to be contacted at? [phone]  How would you like to obtain your AVS? MyChart    Assessment & Plan     Cough  - Symptomatic COVID-19 Virus (Coronavirus) by PCR Nose; Future             Patient Instructions   Discharge Instructions for COVID-19 Patients  You have--or may have--COVID-19. Please follow the instructions listed below.   If you have a weakened immune system, discuss with your doctor any other actions you need to take.  How can I protect others?  If you have symptoms (fever, cough, body aches or trouble breathing):    Stay home and away from others (self-isolate) until:  ? Your other symptoms have resolved (gotten better). And   ? You've had no fever--and no medicine that reduces fever--for 1 full day (24 hours). And   ? At least 10 days have passed since your symptoms started. (You may need to wait 20 days. Follow the advice of your care team.)  If you don't show symptoms, but testing showed that you have COVID-19:    Stay home and away from others (self-isolate) until at least 10 days have passed since the date of your first positive COVID-19 test.  During this time    Stay in your own room, even for meals. Use your own bathroom if you can.    Stay away from others in your home. No hugging, kissing or shaking hands. No visitors.    Don't go to work, school or anywhere else.    Clean \"high touch\" surfaces often (doorknobs, counters, handles). Use household cleaning spray or wipes.    You'll find a full list of  on the EPA website: www.epa.gov/pesticide-registration/list-n-disinfectants-use-against-sars-cov-2.    Cover your mouth and nose with a mask or other face covering to avoid spreading germs.    Wash your hands and face often. Use soap and water.    Caregivers in these groups are at risk for severe illness due to COVID-19:  ? People 65 years and older  ? People who live in a " nursing home or long-term care facility  ? People with chronic disease (lung, heart, cancer, diabetes, kidney, liver, immunologic)  ? People who have a weakened immune system, including those who:    Are in cancer treatment    Take medicine that weakens the immune system, such as corticosteroids    Had a bone marrow or organ transplant    Have an immune deficiency    Have poorly controlled HIV or AIDS    Are obese (body mass index of 40 or higher)    Smoke regularly    Caregivers should wear gloves while washing dishes, handling laundry and cleaning bedrooms and bathrooms.    Use caution when washing and drying laundry: Don't shake dirty laundry and use the warmest water setting that you can.    For more tips on managing your health at home, go to www.cdc.gov/coronavirus/2019-ncov/downloads/10Things.pdf.  How can I take care of myself at home?  1. Get lots of rest. Drink extra fluids (unless a doctor has told you not to).  2. Take Tylenol (acetaminophen) for fever or pain. If you have liver or kidney problems, ask your family doctor if it's okay to take Tylenol.   Adults can take either:   ? 650 mg (two 325 mg pills) every 4 to 6 hours, or   ? 1,000 mg (two 500 mg pills) every 8 hours as needed.  ? Note: Don't take more than 3,000 mg in one day. Acetaminophen is found in many medicines (both prescribed and over-the-counter medicines). Read all labels to be sure you don't take too much.   For children, check the Tylenol bottle for the right dose. The dose is based on the child's age or weight.  3. If you have other health problems (like cancer, heart failure, an organ transplant or severe kidney disease): Call your specialty clinic if you don't feel better in the next 2 days.  4. Know when to call 911. Emergency warning signs include:  ? Trouble breathing or shortness of breath  ? Pain or pressure in the chest that doesn't go away  ? Feeling confused like you haven't felt before, or not being able to wake  up  ? Bluish-colored lips or face  5. Your doctor may have prescribed a blood thinner medicine. Follow their instructions.  Where can I get more information?    [facility] - About COVID-19:   [email or web address]    CDC - What to Do If You're Sick: www.cdc.gov/coronavirus/2019-ncov/about/steps-when-sick.html    CDC - Ending Home Isolation: www.cdc.gov/coronavirus/2019-ncov/hcp/disposition-in-home-patients.html    CDC - Caring for Someone: www.cdc.gov/coronavirus/2019-ncov/if-you-are-sick/care-for-someone.html    [facility] - Interim Guidance for Hospital Discharge to Home: www.health.[facility].[place]./diseases/coronavirus/hcp/hospdischarge.pdf    Below are the COVID-19 hotlines at the [place] Department of Health ([facility]). Interpreters are available.  ? For health questions: Call 909-009-5817 or 1-417.231.2916 (7 a.m. to 7 p.m.)  ? For questions about schools and childcare: Call 727-204-5583 or 1-592.135.5093 (7 a.m. to 7 p.m.)    For informational purposes only. Not to replace the advice of your health care provider. Clinically reviewed by Dr. Toni Nunez.   Copyright   2020 [facility]. All rights reserved. [employer] [id number] - REV 01/05/21.        The risks, benefits and treatment options of prescribed medications or other treatments have been discussed with the patient. The patient verbalized their understanding and should call or follow up if no improvement or if they develop further problems.    YARA Rojas CNP  [facility] SUMAYA Jenkins is a 65 year old who presents for the following health issues     HPI       Concern for COVID-19  Is concerned because  is immunocompromised.  About how many days ago did these symptoms start? 3-4 days ago  Is this your first visit for this illness? Yes  In the 14 days before your symptoms started, have you had close contact with someone with COVID-19 (Coronavirus)? I do not know.  Do you have a fever  or chills? No  Are you having new or worsening difficulty breathing? No  Do you have new or worsening cough? Yes, it's a dry cough.   Have you had any new or unexplained body aches? No    Have you experienced any of the following NEW symptoms?    Headache: No    Sore throat: YES    Loss of taste or smell: No    Chest pain: No    Diarrhea: No    Rash: No     Patient has sinus congestion  What treatments have you tried? Nothing yet due to bp medication  Who do you live with?   Are you, or a household member, a healthcare worker or a ? No  Do you live in a nursing home, group home, or shelter? No  Do you have a way to get food/medications if quarantined? Yes, I have a friend or family member who can help me.      Has had 2 doses of COVID vaccine.    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                Review of Systems   Constitutional, HEENT, cardiovascular, pulmonary, gi and gu systems are negative, except as otherwise noted.      Objective           Vitals:  No vitals were obtained today due to virtual visit.    Physical Exam   PSYCH: Alert and oriented times 3; coherent speech, normal   rate and volume, able to articulate logical thoughts, able   to abstract reason, no tangential thoughts, no hallucinations   or delusions    RESP: No cough, no audible wheezing, able to talk in full sentences  Remainder of exam unable to be completed due to telephone visits                Phone call duration: 5 minutes

## 2021-10-29 ENCOUNTER — LAB (OUTPATIENT)
Dept: FAMILY MEDICINE | Facility: CLINIC | Age: 66
End: 2021-10-29
Attending: NURSE PRACTITIONER
Payer: COMMERCIAL

## 2021-10-29 DIAGNOSIS — R05.9 COUGH: ICD-10-CM

## 2021-10-29 PROCEDURE — 99207 PR NO CHARGE LOS: CPT

## 2021-10-29 PROCEDURE — U0003 INFECTIOUS AGENT DETECTION BY NUCLEIC ACID (DNA OR RNA); SEVERE ACUTE RESPIRATORY SYNDROME CORONAVIRUS 2 (SARS-COV-2) (CORONAVIRUS DISEASE [COVID-19]), AMPLIFIED PROBE TECHNIQUE, MAKING USE OF HIGH THROUGHPUT TECHNOLOGIES AS DESCRIBED BY CMS-2020-01-R: HCPCS

## 2021-10-29 PROCEDURE — U0005 INFEC AGEN DETEC AMPLI PROBE: HCPCS

## 2021-10-30 ENCOUNTER — NURSE TRIAGE (OUTPATIENT)
Dept: NURSING | Facility: CLINIC | Age: 66
End: 2021-10-30

## 2021-10-30 ENCOUNTER — TELEPHONE (OUTPATIENT)
Dept: FAMILY MEDICINE | Facility: CLINIC | Age: 66
End: 2021-10-30
Payer: COMMERCIAL

## 2021-10-30 LAB — SARS-COV-2 RNA RESP QL NAA+PROBE: POSITIVE

## 2021-10-30 NOTE — TELEPHONE ENCOUNTER
"-Coronavirus (COVID-19) Notification    Caller Name (Patient, parent, daughter/son, grandparent, etc)  Aretha Hooks    Reason for call  Notify of Positive Coronavirus (COVID-19) lab results, assess symptoms,  review  [employer] recommendations    Lab Result    Lab test:  2019-nCoV rRt-PCR or SARS-CoV-2 PCR    Oropharyngeal AND/OR nasopharyngeal swabs is POSITIVE for 2019-nCoV RNA/SARS-COV-2 PCR (COVID-19 virus)    RN Recommendations/Instructions per [facility] Coronavirus COVID-19 recommendations    Brief introduction script  Introduce self then review script:  \"I am calling on behalf of "[employer]  We were notified that your Coronavirus test (COVID-19) for was POSITIVE for the virus.  I have some information to relay to you but first I wanted to mention that the [place] Dept of Health will be contacting you shortly [it's possible MD already called Patient] to talk to you more about how you are feeling and other people you have had contact with who might now also have the virus.  Also,  [employer] is Partnering with the [facility] for Covid-19 research, you may be contacted directly by research staff.\"    Assessment (Inquire about Patient's current symptoms)   Assessment   Current Symptoms at time of phone call: (if no symptoms, document No symptoms] Cough, sore throat, mild headache, sinus congestion   Symptoms onset (if applicable) 10/25/2021     If at time of call, Patients symptoms hare worsened, the Patient should contact 911 or have someone drive them to Emergency Dept promptly:      If Patient calling 911, inform 911 personal that you have tested positive for the Coronavirus (COVID-19).  Place mask on and await 911 to arrive.    If Emergency Dept, If possible, please have another adult drive you to the Emergency Dept but you need to wear mask when in contact with other people.      Monoclonal Antibody Administration    You may be eligible to receive a new treatment with a " "monoclonal antibody for preventing hospitalization in patients at high risk for complications from COVID-19.   This medication is still experimental and available on a limited basis; it is given through an IV and must be given at an infusion center. Please note that not all people who are eligible will receive the medication since it is in limited supply.     Are you interested in being considered for this medication?  Yes.   Is the patient symptomatic?  Yes. Is the patient 18 years of age or older? Yes.  Is the patient newly (within the last week) on supplemental oxygen or requiring more oxygen than usual?  No. Patient criteria for selection: Is the patients weight equal to or greater than 40 kg (88 lbs)? Yes.  Is the patient's age 65 years or older?  No. Is the patient 55 years or older and have one or more of the following conditions: Hypertension, CHF, COPD/Chronic pulmonary disease?  Yes.  Patient qualifies, refer to [facility].  Does the patient fit the criteria: Yes: Patient referred to [facility] website, patient or family/friend will complete application.    If patient qualifies based on above criteria:  \"You will be contacted if you are selected to receive this treatment in the next 1-2 business days.   This is time sensitive and if you are not selected in the next 1-2 business days, you will not receive the medication.  If you do not receive a call to schedule, you have not been selected.\"      Review information with Patient    Your result was positive. This means you have COVID-19 (coronavirus).  We have sent you a letter that reviews the information that I'll be reviewing with you now.    How can I protect others?    If you have symptoms: stay home and away from others (self-isolate) until:    You've had no fever--and no medicine that reduces fever--for 1 full day (24 hours). And       Your other symptoms have gotten better. For example, your cough or breathing has improved. And     At least 10 days have passed " since your symptoms started. (If you've been told by a doctor that you have a weak immune system, wait 20 days.)     If you don't have symptoms: Stay home and away from others (self-isolate) until at least 10 days have passed since your first positive COVID-19 test. (Date test collected)    During this time:    Stay in your own room, including for meals. Use your own bathroom if you can.    Stay away from others in your home. No hugging, kissing or shaking hands. No visitors.     Don't go to work, school or anywhere else.     Clean  high touch  surfaces often (doorknobs, counters, handles, etc.). Use a household cleaning spray or wipes. You'll find a full list on the EPA website at www.epa.gov/pesticide-registration/list-n-disinfectants-use-against-sars-cov-2.     Cover your mouth and nose with a mask, tissue or other face covering to avoid spreading germs.    Wash your hands and face often with soap and water.    Make a list of people you have been in close contact with recently, even if either of you wore a face covering.   ; Start your list from 2 days before you became ill or had a positive test.  ; Include anyone that was within 6 feet of you for a cumulative total of 15 minutes or more in 24 hours. (Example: if you sat next to Jax for 5 minutes in the morning and 10 minutes in the afternoon, then you were in close contact for 15 minutes total that day. Jax would be added to your list.)    A public health worker will call or text you. It is important that you answer. They will ask you questions about possible exposures to COVID-19, such as people you have been in direct contact with and places you have visited.    Tell the people on your list that you have COVID-19; they should stay away from others for 14 days starting from the last time they were in contact with you (unless you are told something different from a public health worker).     Caregivers in these groups are at risk for severe illness due to  COVID-19:  o People 65 years and older  o People who live in a nursing home or long-term care facility  o People with chronic disease (lung, heart, cancer, diabetes, kidney, liver, immunologic)  o People who have a weakened immune system, including those who:  - Are in cancer treatment  - Take medicine that weakens the immune system, such as corticosteroids  - Had a bone marrow or organ transplant  - Have an immune deficiency  - Have poorly controlled HIV or AIDS  - Are obese (body mass index of 40 or higher)  - Smoke regularly    Caregivers should wear gloves while washing dishes, handling laundry and cleaning bedrooms and bathrooms.    Wash and dry laundry with special caution. Don't shake dirty laundry, and use the warmest water setting you can.    If you have a weakened immune system, ask your doctor about other actions you should take.    For more tips, go to www.cdc.gov/coronavirus/2019-ncov/downloads/10Things.pdf.    You should not go back to work until you meet the guidelines above for ending your home isolation. You don't need to be retested for COVID-19 before going back to work--studies show that you won't spread the virus if it's been at least 10 days since your symptoms started (or 20 days, if you have a weak immune system).    Employers: This document serves as formal notice of your employee's medical guidelines for going back to work. They must meet the above guidelines before going back to work in person.    How can I take care of myself?    1. Get lots of rest. Drink extra fluids (unless a doctor has told you not to).    2. Take Tylenol (acetaminophen) for fever or pain. If you have liver or kidney problems, ask your family doctor if it's okay to take Tylenol.     Take either:     650 mg (two 325 mg pills) every 4 to 6 hours, or     1,000 mg (two 500 mg pills) every 8 hours as needed.     Note: Don't take more than 3,000 mg in one day. Acetaminophen is found in many medicines (both prescribed and  over-the-counter medicines). Read all labels to be sure you don't take too much.    For children, check the Tylenol bottle for the right dose (based on their age or weight).    3. If you have other health problems (like cancer, heart failure, an organ transplant or severe kidney disease): Call your specialty clinic if you don't feel better in the next 2 days.    4. Know when to call 911: Emergency warning signs include:    Trouble breathing or shortness of breath    Pain or pressure in the chest that doesn't go away    Feeling confused like you haven't felt before, or not being able to wake up    Bluish-colored lips or face    5. Sign up for [employer]. We know it's scary to hear that you have COVID-19. We want to track your symptoms to make sure you're okay over the next 2 weeks. Please look for an email from [employer]--this is a free, online program that we'll use to keep in touch. To sign up, follow the link in the email. Learn more at [email or web address].    Where can I get more information?    [facility]: [email or web address]    Coronavirus Basics: www.health.[facility].[place].us/diseases/coronavirus/basics.html    What to Do If You're Sick: www.cdc.gov/coronavirus/2019-ncov/about/steps-when-sick.html    Ending Home Isolation: www.cdc.gov/coronavirus/2019-ncov/hcp/disposition-in-home-patients.html     Caring for Someone with COVID-19: www.cdc.gov/coronavirus/2019-ncov/if-you-are-sick/care-for-someone.html     [facility] clinical trials (COVID-19 research studies): clinicalaffairs.[facility]/n-clinical-trials     A Positive COVID-19 letter will be sent via [employer] or the mail. (Exception, no letters sent to Presurgerical/Preprocedure Patients)    Mis Castelan RN    Reason for Disposition    Caller requesting lab results    Protocols used: PCP CALL - NO TRIAGE-A-

## 2021-10-30 NOTE — TELEPHONE ENCOUNTER
Coronavirus (COVID-19) Notification    Reason for call  Notify of POSITIVE  COVID-19 lab result, assess symptoms,  review [facility] recommendations    Lab Result   Lab test for 2019-nCoV rRt-PCR or SARS-COV-2 PCR  Oropharyngeal AND/OR nasopharyngeal swabs were POSITIVE for 2019-nCoV RNA [OR] SARS-COV-2 RNA (COVID-19) RNA     We have been unable to reach Patient by phone at this time to notify of their Positive COVID-19 result.  Left voicemail message requesting a call back to [phone] [facility] for results.        POSITIVE COVID-19 Letter sent.    Kristin Gan LPN

## 2021-11-04 ENCOUNTER — HOSPITAL ENCOUNTER (OUTPATIENT)
Dept: MAMMOGRAPHY | Facility: CLINIC | Age: 66
Discharge: HOME | End: 2021-11-04
Attending: NURSE PRACTITIONER | Admitting: NURSE PRACTITIONER
Payer: COMMERCIAL

## 2021-11-04 DIAGNOSIS — Z12.31 VISIT FOR SCREENING MAMMOGRAM: ICD-10-CM

## 2021-11-04 PROCEDURE — 77067 SCR MAMMO BI INCL CAD: CPT

## 2021-11-26 ENCOUNTER — TELEPHONE (OUTPATIENT)
Dept: FAMILY MEDICINE | Facility: CLINIC | Age: 66
End: 2021-11-26

## 2021-11-26 NOTE — TELEPHONE ENCOUNTER
S-(situation): loss of taste and smell due to covid    B-(background): Dx with Covid a month ago.  Loss of taste and smell.  Wondering if a Abx will bring it back.  Has post nasal drip and feels like she still has sinus issues.    A-(assessment): post covid    R-(recommendations): can take months for loss of taste and smell to come back.   For her sinus issues try coricidin and flonase. Yumi MAURO RN

## 2022-02-01 ENCOUNTER — OFFICE VISIT (OUTPATIENT)
Dept: FAMILY MEDICINE | Facility: CLINIC | Age: 67
End: 2022-02-01
Payer: COMMERCIAL

## 2022-02-01 VITALS
HEIGHT: 66 IN | RESPIRATION RATE: 14 BRPM | OXYGEN SATURATION: 97 % | DIASTOLIC BLOOD PRESSURE: 80 MMHG | HEART RATE: 62 BPM | BODY MASS INDEX: 29.5 KG/M2 | TEMPERATURE: 98.5 F | SYSTOLIC BLOOD PRESSURE: 126 MMHG

## 2022-02-01 DIAGNOSIS — Z00.00 ROUTINE GENERAL MEDICAL EXAMINATION AT A HEALTH CARE FACILITY: Primary | ICD-10-CM

## 2022-02-01 DIAGNOSIS — Z13.6 CARDIOVASCULAR SCREENING; LDL GOAL LESS THAN 130: ICD-10-CM

## 2022-02-01 DIAGNOSIS — E78.1 HYPERTRIGLYCERIDEMIA: ICD-10-CM

## 2022-02-01 DIAGNOSIS — E53.8 VITAMIN B12 DEFICIENCY (NON ANEMIC): ICD-10-CM

## 2022-02-01 DIAGNOSIS — R20.9 SENSORY DISORDER: ICD-10-CM

## 2022-02-01 DIAGNOSIS — M51.369 DDD (DEGENERATIVE DISC DISEASE), LUMBAR: ICD-10-CM

## 2022-02-01 DIAGNOSIS — M17.0 PRIMARY OSTEOARTHRITIS OF BOTH KNEES: ICD-10-CM

## 2022-02-01 DIAGNOSIS — I10 ESSENTIAL HYPERTENSION: ICD-10-CM

## 2022-02-01 DIAGNOSIS — I10 ESSENTIAL HYPERTENSION WITH GOAL BLOOD PRESSURE LESS THAN 140/90: ICD-10-CM

## 2022-02-01 LAB
ANION GAP SERPL CALCULATED.3IONS-SCNC: 3 MMOL/L (ref 3–14)
BUN SERPL-MCNC: 13 MG/DL (ref 7–30)
CALCIUM SERPL-MCNC: 9.5 MG/DL (ref 8.5–10.1)
CHLORIDE BLD-SCNC: 106 MMOL/L (ref 94–109)
CHOLEST SERPL-MCNC: 148 MG/DL
CO2 SERPL-SCNC: 30 MMOL/L (ref 20–32)
CREAT SERPL-MCNC: 0.7 MG/DL (ref 0.52–1.04)
FASTING STATUS PATIENT QL REPORTED: NO
GFR SERPL CREATININE-BSD FRML MDRD: >90 ML/MIN/1.73M2
GLUCOSE BLD-MCNC: 96 MG/DL (ref 70–99)
HDLC SERPL-MCNC: 61 MG/DL
LDLC SERPL CALC-MCNC: 56 MG/DL
NONHDLC SERPL-MCNC: 87 MG/DL
POTASSIUM BLD-SCNC: 3.6 MMOL/L (ref 3.4–5.3)
SODIUM SERPL-SCNC: 139 MMOL/L (ref 133–144)
TRIGL SERPL-MCNC: 153 MG/DL

## 2022-02-01 PROCEDURE — 90471 IMMUNIZATION ADMIN: CPT | Performed by: NURSE PRACTITIONER

## 2022-02-01 PROCEDURE — 99213 OFFICE O/P EST LOW 20 MIN: CPT | Mod: 25 | Performed by: NURSE PRACTITIONER

## 2022-02-01 PROCEDURE — 80061 LIPID PANEL: CPT | Performed by: NURSE PRACTITIONER

## 2022-02-01 PROCEDURE — 90682 RIV4 VACC RECOMBINANT DNA IM: CPT | Performed by: NURSE PRACTITIONER

## 2022-02-01 PROCEDURE — 36415 COLL VENOUS BLD VENIPUNCTURE: CPT | Performed by: NURSE PRACTITIONER

## 2022-02-01 PROCEDURE — 99396 PREV VISIT EST AGE 40-64: CPT | Mod: 25 | Performed by: NURSE PRACTITIONER

## 2022-02-01 PROCEDURE — 80048 BASIC METABOLIC PNL TOTAL CA: CPT | Performed by: NURSE PRACTITIONER

## 2022-02-01 RX ORDER — LABETALOL 100 MG/1
300 TABLET, FILM COATED ORAL 2 TIMES DAILY
Qty: 540 TABLET | Refills: 3 | Status: SHIPPED | OUTPATIENT
Start: 2022-02-01 | End: 2023-03-28

## 2022-02-01 RX ORDER — LANOLIN ALCOHOL/MO/W.PET/CERES
1000 CREAM (GRAM) TOPICAL DAILY
Qty: 90 TABLET | Refills: 3 | Status: SHIPPED | OUTPATIENT
Start: 2022-02-01 | End: 2023-03-28

## 2022-02-01 RX ORDER — GABAPENTIN 300 MG/1
CAPSULE ORAL
Qty: 60 CAPSULE | Refills: 3 | Status: SHIPPED | OUTPATIENT
Start: 2022-02-01 | End: 2022-08-11

## 2022-02-01 RX ORDER — LOSARTAN POTASSIUM 50 MG/1
50 TABLET ORAL DAILY
Qty: 90 TABLET | Refills: 3 | Status: SHIPPED | OUTPATIENT
Start: 2022-02-01 | End: 2023-03-28

## 2022-02-01 RX ORDER — HYDROCHLOROTHIAZIDE 12.5 MG/1
12.5 CAPSULE ORAL EVERY MORNING
Qty: 90 CAPSULE | Refills: 3 | Status: SHIPPED | OUTPATIENT
Start: 2022-02-01 | End: 2023-03-28

## 2022-02-01 ASSESSMENT — ENCOUNTER SYMPTOMS
COUGH: 0
NAUSEA: 0
DIZZINESS: 0
JOINT SWELLING: 0
ARTHRALGIAS: 1
EYE PAIN: 0
PALPITATIONS: 0
HEARTBURN: 0
HEMATOCHEZIA: 0
ABDOMINAL PAIN: 0
HEADACHES: 0
CHILLS: 0
NERVOUS/ANXIOUS: 0
WEAKNESS: 0
DIARRHEA: 0
PARESTHESIAS: 0
FREQUENCY: 0
HEMATURIA: 0
SORE THROAT: 0
DYSURIA: 0
CONSTIPATION: 0
SHORTNESS OF BREATH: 0
MYALGIAS: 0
FEVER: 0
BREAST MASS: 0

## 2022-02-01 NOTE — PATIENT INSTRUCTIONS
Diclofenac or Voltaren topical available over the counter as needed for joint pain.      Preventive Health Recommendations  Female Ages 50 - 64    Yearly exam: See your health care provider every year in order to  o Review health changes.   o Discuss preventive care.    o Review your medicines if your doctor has prescribed any.      Get a Pap test every three years (unless you have an abnormal result and your provider advises testing more often).    If you get Pap tests with HPV test, you only need to test every 5 years, unless you have an abnormal result.     You do not need a Pap test if your uterus was removed (hysterectomy) and you have not had cancer.    You should be tested each year for STDs (sexually transmitted diseases) if you're at risk.     Have a mammogram every 1 to 2 years.    Have a colonoscopy at age 50, or have a yearly FIT test (stool test). These exams screen for colon cancer.      Have a cholesterol test every 5 years, or more often if advised.    Have a diabetes test (fasting glucose) every three years. If you are at risk for diabetes, you should have this test more often.     If you are at risk for osteoporosis (brittle bone disease), think about having a bone density scan (DEXA).    Shots: Get a flu shot each year. Get a tetanus shot every 10 years.    Nutrition:     Eat at least 5 servings of fruits and vegetables each day.    Eat whole-grain bread, whole-wheat pasta and brown rice instead of white grains and rice.    Get adequate Calcium and Vitamin D.     Lifestyle    Exercise at least 150 minutes a week (30 minutes a day, 5 days a week). This will help you control your weight and prevent disease.    Limit alcohol to one drink per day.    No smoking.     Wear sunscreen to prevent skin cancer.     See your dentist every six months for an exam and cleaning.    See your eye doctor every 1 to 2 years.

## 2022-02-01 NOTE — PROGRESS NOTES
SUBJECTIVE:   CC: Aretha Hooks is an 66 year old woman who presents for preventive health visit.       Patient has been advised of split billing requirements and indicates understanding: Yes  Healthy Habits:     Getting at least 3 servings of Calcium per day:  NO    Bi-annual eye exam:  Yes    Dental care twice a year:  Yes    Sleep apnea or symptoms of sleep apnea:  None    Diet:  Regular (no restrictions)    Frequency of exercise:  6-7 days/week    Duration of exercise:  30-45 minutes    Taking medications regularly:  Yes    Barriers to taking medications:  None    Medication side effects:  None    PHQ-2 Total Score: 0    Additional concerns today:  No      She has been having more lower back and left knee pain. Hx of degenerative disc disease and osteoarthritis in her left knee. She tore her meniscus 2009 s/p arthroscopy. Knee and back feel stiff in the morning. She takes 1500mg of tylenol twice daily for the pain which helps a little. She also receives injections in left knee. She would like something else to help with pain. She continues to walk on a treadmill daily.         Today's PHQ-2 Score:   PHQ-2 ( 1999 Pfizer) 2/1/2022   Q1: Little interest or pleasure in doing things 0   Q2: Feeling down, depressed or hopeless 0   PHQ-2 Score 0   PHQ-2 Total Score (12-17 Years)- Positive if 3 or more points; Administer PHQ-A if positive -   Q1: Little interest or pleasure in doing things Not at all   Q2: Feeling down, depressed or hopeless Not at all   PHQ-2 Score 0       Abuse: Current or Past (Physical, Sexual or Emotional) - No  Do you feel safe in your environment? Yes    Have you ever done Advance Care Planning? (For example, a Health Directive, POLST, or a discussion with a medical provider or your loved ones about your wishes): No, advance care planning information given to patient to review.  Patient plans to discuss their wishes with loved ones or provider.      Social History     Tobacco Use      Smoking status: Passive Smoke Exposure - Never Smoker     Smokeless tobacco: Never Used     Tobacco comment: exposed to 's cigarette smoke   Substance Use Topics     Alcohol use: Yes     Comment: occ     If you drink alcohol do you typically have >3 drinks per day or >7 drinks per week? No    Alcohol Use 2/1/2022   Prescreen: >3 drinks/day or >7 drinks/week? No   Prescreen: >3 drinks/day or >7 drinks/week? -       Reviewed orders with patient.  Reviewed health maintenance and updated orders accordingly - Yes  Labs reviewed in EPIC  BP Readings from Last 3 Encounters:   02/01/22 126/80   04/20/21 128/78   10/23/19 132/71    Wt Readings from Last 3 Encounters:   09/28/19 81.6 kg (180 lb)   07/26/19 79.4 kg (175 lb)   11/10/17 79.4 kg (175 lb)                  Patient Active Problem List   Diagnosis     Essential hypertension     Other specified disorder of rectum and anus     Hypertriglyceridemia     Vitamin B12 deficiency (non anemic)     CARDIOVASCULAR SCREENING; LDL GOAL LESS THAN 130     Tubular adenoma of colon     Brain aneurysm     Sensory disorder     DDD (degenerative disc disease), lumbar     Primary osteoarthritis of both knees     Past Surgical History:   Procedure Laterality Date     COLONOSCOPY N/A 11/10/2017    Procedure: COMBINED COLONOSCOPY, SINGLE OR MULTIPLE BIOPSY/POLYPECTOMY BY BIOPSY;  colonoscopy;  Surgeon: Emil Vaz MD;  Location: [facility]     SURGICAL HISTORY OF -   2004    Cervical dilatation with curettage and hysteroscopy with rollerball endometrial ablation     SURGICAL HISTORY OF -   1/2004    Laparoscopic Cholecystectomy     SURGICAL HISTORY OF -   1988    Cryocautery to cervix for bleeding     SURGICAL HISTORY OF -   2004    endometrial ablation     SURGICAL HISTORY OF -   1/2008    carcinoid tumor rectum  [facility] Yeison Lawson     [facility] APPENDECTOMY  Age 12    Appendectomy     Z LIGATE FALLOPIAN TUBE  1984    Tubal Ligation     [facility] KNEE SCOPE,MED+LAT MENIS  REPAIR  2009    left knee       Social History     Tobacco Use     Smoking status: Passive Smoke Exposure - Never Smoker     Smokeless tobacco: Never Used     Tobacco comment: exposed to 's cigarette smoke   Substance Use Topics     Alcohol use: Yes     Comment: occ     Family History   Problem Relation Age of Onset     Cancer Mother         Renal     Hypertension Father      Respiratory Father         uses cpap     Eye Disorder Father         cataracts     Hypertension Brother      Musculoskeletal Disorder Sister         back surg.     Skin Cancer Sister      Genitourinary Problems Sister      Cancer Sister         skin cancer     Gynecology Sister         hysterectomy     Skin Cancer Sister      C.A.D. Paternal Grandmother      Breast Cancer No family hx of      Cancer - colorectal No family hx of      Colon Cancer No family hx of          Current Outpatient Medications   Medication Sig Dispense Refill     CALTRATE 600 + D 600-200 MG-IU OR TABS 2 TABLET DAILY       cyanocobalamin (VITAMIN B-12) 1000 MCG tablet Take 1 tablet (1,000 mcg) by mouth daily 90 tablet 3     gabapentin (NEURONTIN) 300 MG capsule Take 1 capsule (300 mg) by mouth At Bedtime for 14 days, THEN 1 capsule (300 mg) 2 times daily for 14 days. 60 capsule 3     hydrochlorothiazide (MICROZIDE) 12.5 MG capsule Take 1 capsule (12.5 mg) by mouth every morning 90 capsule 3     labetalol (NORMODYNE) 100 MG tablet Take 3 tablets (300 mg) by mouth 2 times daily 540 tablet 3     losartan (COZAAR) 50 MG tablet Take 1 tablet (50 mg) by mouth daily 90 tablet 3     MULTIVITAMIN TABS   OR 1 TABLET DAILY       NEEDLES, ANY SIZE Use once a month for B12 injection 1 Box 1     Syringe, Disposable, (SYRINGE/CANNULA) 3 ML MISC 3 ml syringe 12 each 1     Fish Oil OIL 1 capsule twice daily - unknown dose       No Known Allergies    Breast Cancer Screening:    Breast CA Risk Assessment (FHS-7) 2/1/2022   Do you have a family history of breast, colon, or ovarian  "cancer? No / Unknown         Mammogram Screening: Recommended mammography every 1-2 years with patient discussion and risk factor consideration  Pertinent mammograms are reviewed under the imaging tab.    History of abnormal Pap smear: NO - age 30-65 PAP every 5 years with negative HPV co-testing recommended  PAP / HPV Latest Ref Rng & Units 8/27/2019 6/13/2016 4/1/2013   PAP (Historical) - NIL NIL NIL   HPV16 NEG:Negative Negative Negative -   HPV18 NEG:Negative Negative Negative -   HRHPV NEG:Negative Negative Negative -     Reviewed and updated as needed this visit by clinical staff  Tobacco  Allergies  Meds  Problems  Med Hx  Surg Hx  Fam Hx  Soc Hx         Reviewed and updated as needed this visit by Provider    Meds  Problems             Review of Systems   Constitutional: Negative for chills and fever.   HENT: Negative for congestion, ear pain, hearing loss and sore throat.    Eyes: Negative for pain and visual disturbance.   Respiratory: Negative for cough and shortness of breath.    Cardiovascular: Negative for chest pain, palpitations and peripheral edema.   Gastrointestinal: Negative for abdominal pain, constipation, diarrhea, heartburn, hematochezia and nausea.   Breasts:  Negative for tenderness, breast mass and discharge.   Genitourinary: Negative for dysuria, frequency, genital sores, hematuria, pelvic pain, urgency, vaginal bleeding and vaginal discharge.   Musculoskeletal: Positive for arthralgias. Negative for joint swelling and myalgias.   Skin: Negative for rash.   Neurological: Negative for dizziness, weakness, headaches and paresthesias.   Psychiatric/Behavioral: Negative for mood changes. The patient is not nervous/anxious.           OBJECTIVE:   /80 (BP Location: Right arm, Patient Position: Sitting, Cuff Size: Adult Large)   Pulse 62   Temp 98.5  F (36.9  C) (Tympanic)   Resp 14   Ht 1.664 m (5' 5.5\")   LMP 04/01/2008   SpO2 97%   Breastfeeding No   BMI 29.50 kg/m   "     Physical Exam  GENERAL APPEARANCE: healthy, alert and no distress  EYES: Eyes grossly normal to inspection, PERRL and conjunctivae and sclerae normal  HENT: ear canals and TM's normal, nose and mouth without ulcers or lesions, oropharynx clear and oral mucous membranes moist  NECK: no adenopathy, no asymmetry, masses, or scars and thyroid normal to palpation  RESP: lungs clear to auscultation - no rales, rhonchi or wheezes  BREAST: normal without masses, tenderness or nipple discharge and no palpable axillary masses or adenopathy  CV: regular rate and rhythm, normal S1 S2, no S3 or S4, no murmur, click or rub, no peripheral edema and peripheral pulses strong  ABDOMEN: soft, nontender, no hepatosplenomegaly, no masses and bowel sounds normal  MS: gait is age appropriate without ataxia, range of motion normal in all extremities, mild swelling left knee, no joint deformity  SKIN: no suspicious lesions or rashes  NEURO: Normal strength and tone, sensory exam grossly normal, mentation intact and speech normal  PSYCH: mentation appears normal and affect normal/bright    Diagnostic Test Results:  Labs reviewed in Epic  Results for orders placed or performed in visit on 02/01/22 (from the past 24 hour(s))   Basic metabolic panel  (Ca, Cl, CO2, Creat, Gluc, K, Na, BUN)   Result Value Ref Range    Sodium 139 133 - 144 mmol/L    Potassium 3.6 3.4 - 5.3 mmol/L    Chloride 106 94 - 109 mmol/L    Carbon Dioxide (CO2) 30 20 - 32 mmol/L    Anion Gap 3 3 - 14 mmol/L    Urea Nitrogen 13 7 - 30 mg/dL    Creatinine 0.70 0.52 - 1.04 mg/dL    Calcium 9.5 8.5 - 10.1 mg/dL    Glucose 96 70 - 99 mg/dL    GFR Estimate >90 >60 mL/min/1.73m2   Lipid panel reflex to direct LDL Fasting   Result Value Ref Range    Cholesterol 148 <200 mg/dL    Triglycerides 153 (H) <150 mg/dL    Direct Measure HDL 61 >=50 mg/dL    LDL Cholesterol Calculated 56 <=100 mg/dL    Non HDL Cholesterol 87 <130 mg/dL    Patient Fasting > 8hrs? No     Narrative     Cholesterol  Desirable:  <200 mg/dL    Triglycerides  Normal:  Less than 150 mg/dL  Borderline High:  150-199 mg/dL  High:  200-499 mg/dL  Very High:  Greater than or equal to 500 mg/dL    Direct Measure HDL  Female:  Greater than or equal to 50 mg/dL   Male:  Greater than or equal to 40 mg/dL    LDL Cholesterol  Desirable:  <100mg/dL  Above Desirable:  100-129 mg/dL   Borderline High:  130-159 mg/dL   High:  160-189 mg/dL   Very High:  >= 190 mg/dL    Non HDL Cholesterol  Desirable:  130 mg/dL  Above Desirable:  130-159 mg/dL  Borderline High:  160-189 mg/dL  High:  190-219 mg/dL  Very High:  Greater than or equal to 220 mg/dL       ASSESSMENT/PLAN:   (Z00.00) Routine general medical examination at a health care facility  (primary encounter diagnosis)  Comment: Annual female exam completed today. Discussed getting regular cardiovascular exercise and eating a healthy diet. Pap history reviewed. Due for colonoscopy last completed 2017. Flu shot today.     (E78.1) Hypertriglyceridemia  Plan: Lipid panel reflex to direct LDL Fasting    (M17.0) Primary osteoarthritis of both knees  Comment: Left knee stiffness/pain. Currently taking tylenol 3000 mg daily. Unable to take NSAIDS with her hypertension. Discussed oral and topical options. Will try gabapentin and follow up in 3-4 weeks.   Plan: gabapentin (NEURONTIN) 300 MG capsule    (M51.36) DDD (degenerative disc disease), lumbar  Comment: Chronic lower back pain. Discussed options. Will try gabapentin and follow up in 3-4 weeks.  Plan: gabapentin (NEURONTIN) 300 MG capsule    (I10) Essential hypertension with goal blood pressure less than 140/90  Comment: Stable, refilled current medications.   Plan: hydrochlorothiazide (MICROZIDE) 12.5 MG         capsule, labetalol (NORMODYNE) 100 MG tablet,         losartan (COZAAR) 50 MG tablet          (E53.8) Vitamin B12 deficiency (non anemic)  Comment: History of deficiency. Currently takes monthly injections. Change to PO and  "check lab two months after initiation.   Plan: cyanocobalamin (VITAMIN B-12) 1000 MCG tablet,         Vitamin B12           (Z13.6) CARDIOVASCULAR SCREENING; LDL GOAL LESS THAN 130  Plan: Lipid panel reflex to direct LDL Fasting              Patient has been advised of split billing requirements and indicates understanding: Yes    COUNSELING:  Reviewed preventive health counseling, as reflected in patient instructions       Regular exercise       Immunizations    Vaccinated for: Influenza             Osteoporosis prevention/bone health       Colorectal Cancer Screening    Estimated body mass index is 29.5 kg/m  as calculated from the following:    Height as of this encounter: 1.664 m (5' 5.5\").    Weight as of 9/28/19: 81.6 kg (180 lb).    Weight management plan: Discussed healthy diet and exercise guidelines    She reports that she is a non-smoker but has been exposed to tobacco smoke. She has never used smokeless tobacco.    Patient Instructions   Diclofenac or Voltaren topical available over the counter as needed for joint pain.      Preventive Health Recommendations  Female Ages 50 - 64    Yearly exam: See your health care provider every year in order to  o Review health changes.   o Discuss preventive care.    o Review your medicines if your doctor has prescribed any.      Get a Pap test every three years (unless you have an abnormal result and your provider advises testing more often).    If you get Pap tests with HPV test, you only need to test every 5 years, unless you have an abnormal result.     You do not need a Pap test if your uterus was removed (hysterectomy) and you have not had cancer.    You should be tested each year for STDs (sexually transmitted diseases) if you're at risk.     Have a mammogram every 1 to 2 years.    Have a colonoscopy at age 50, or have a yearly FIT test (stool test). These exams screen for colon cancer.      Have a cholesterol test every 5 years, or more often if " advised.    Have a diabetes test (fasting glucose) every three years. If you are at risk for diabetes, you should have this test more often.     If you are at risk for osteoporosis (brittle bone disease), think about having a bone density scan (DEXA).    Shots: Get a flu shot each year. Get a tetanus shot every 10 years.    Nutrition:     Eat at least 5 servings of fruits and vegetables each day.    Eat whole-grain bread, whole-wheat pasta and brown rice instead of white grains and rice.    Get adequate Calcium and Vitamin D.     Lifestyle    Exercise at least 150 minutes a week (30 minutes a day, 5 days a week). This will help you control your weight and prevent disease.    Limit alcohol to one drink per day.    No smoking.     Wear sunscreen to prevent skin cancer.     See your dentist every six months for an exam and cleaning.    See your eye doctor every 1 to 2 years.        Counseling Resources:  ATP IV Guidelines  Pooled Cohorts Equation Calculator  Breast Cancer Risk Calculator  BRCA-Related Cancer Risk Assessment: FHS-7 Tool  FRAX Risk Assessment  ICSI Preventive Guidelines  Dietary Guidelines for Americans, 2010  USDA's MyPlate  ASA Prophylaxis  Lung CA Screening    Judy Jiménez NP  [facility]

## 2022-03-01 ENCOUNTER — TRANSFERRED RECORDS (OUTPATIENT)
Dept: HEALTH INFORMATION MANAGEMENT | Facility: CLINIC | Age: 67
End: 2022-03-01
Payer: COMMERCIAL

## 2022-03-28 RX ORDER — CYANOCOBALAMIN 1000 UG/ML
INJECTION, SOLUTION INTRAMUSCULAR; SUBCUTANEOUS
Refills: 0 | OUTPATIENT
Start: 2022-03-28

## 2022-05-09 ENCOUNTER — MYC MEDICAL ADVICE (OUTPATIENT)
Dept: FAMILY MEDICINE | Facility: CLINIC | Age: 67
End: 2022-05-09
Payer: COMMERCIAL

## 2022-05-10 ENCOUNTER — TELEPHONE (OUTPATIENT)
Dept: FAMILY MEDICINE | Facility: CLINIC | Age: 67
End: 2022-05-10
Payer: COMMERCIAL

## 2022-05-10 ENCOUNTER — VIRTUAL VISIT (OUTPATIENT)
Dept: FAMILY MEDICINE | Facility: CLINIC | Age: 67
End: 2022-05-10
Payer: COMMERCIAL

## 2022-05-10 DIAGNOSIS — R06.2 WHEEZE: ICD-10-CM

## 2022-05-10 DIAGNOSIS — J01.90 ACUTE SINUSITIS WITH SYMPTOMS > 10 DAYS: Primary | ICD-10-CM

## 2022-05-10 DIAGNOSIS — J20.9 ACUTE BRONCHITIS WITH SYMPTOMS > 10 DAYS: ICD-10-CM

## 2022-05-10 PROCEDURE — 99214 OFFICE O/P EST MOD 30 MIN: CPT | Mod: 95 | Performed by: FAMILY MEDICINE

## 2022-05-10 RX ORDER — ALBUTEROL SULFATE 90 UG/1
2 AEROSOL, METERED RESPIRATORY (INHALATION) EVERY 6 HOURS PRN
Qty: 18 G | Refills: 0 | Status: SHIPPED | OUTPATIENT
Start: 2022-05-10 | End: 2023-03-31

## 2022-05-10 NOTE — TELEPHONE ENCOUNTER
I will see virtually and see where this appt goes.  Thanks for checking.  Sincerely,  Kian Chaney MD

## 2022-05-10 NOTE — PATIENT INSTRUCTIONS
Please drink fluids and get your rest.    I am putting you on Augmentin 875 mg taking one tablet twice a day for 10 days, please take with a meal.    Use the albuterol inhaler 2 puffs every 6 hours as needed for shortness of breath and wheeze.          Thank you for choosing [facility].  You may be receiving an email and/or telephone survey request from [facility] Customer Experience regarding your visit today.  Please take a few minutes to respond to the survey to let us know how we are doing.      If you have questions or concerns, please contact us via [employer] or you can contact your care team at [phone] option 2.    Our Clinic hours are:  Monday - Thursday 7am-6pm  Friday 7am-5pm    The [place] outpatient lab hours are:  Monday - Friday 7am-4:30pm    Appointments are required, call [phone]    If you have clinical questions after hours or would like to schedule an appointment,  call the clinic at [phone].

## 2022-05-10 NOTE — PROGRESS NOTES
Alice is a 66 year old who is being evaluated via a billable video visit.      How would you like to obtain your AVS? MyChart  If the video visit is dropped, the invitation should be resent by: Text to cell phone: . [phone]  Will anyone else be joining your video visit? No    Video Start Time: failed video visit and went to telephone visit.  Could not connect.    Assessment & Plan     Acute sinusitis with symptoms > 10 days  Patient has sinus and lower respiratory symptoms ongoing for over 10 days.  Will cover with abx for both areas and add albuterol inhaler.  Instructions were given.  - amoxicillin-clavulanate (AUGMENTIN) 875-125 MG tablet; Take 1 tablet by mouth 2 times daily for 10 days    Acute bronchitis with symptoms > 10 days    - amoxicillin-clavulanate (AUGMENTIN) 875-125 MG tablet; Take 1 tablet by mouth 2 times daily for 10 days    Wheeze    - albuterol (PROAIR HFA/PROVENTIL HFA/VENTOLIN HFA) 108 (90 Base) MCG/ACT inhaler; Inhale 2 puffs into the lungs every 6 hours as needed for shortness of breath / dyspnea or wheezing                 Return in about 1 week (around 5/17/2022) for If not better.    Kian Chaney MD  [facility]    Subjective   Alice is a 66 year old who presents for the following health issues     HPI     Acute Illness  Acute illness concerns: cough  Onset/Duration:  2 weeks  Symptoms:  Fever: no  Chills/Sweats: no  Headache (location?): YES sinus and top of head  Sinus Pressure: YES  Conjunctivitis:  no  Ear Pain: YES: right  Rhinorrhea: YES  Congestion: YES  Sore Throat: no  Cough: YES  Wheeze: YES  Decreased Appetite: no  Nausea: no  Vomiting: no  Diarrhea: YES  Dysuria/Freq.: no  Dysuria or Hematuria: no  Fatigue/Achiness: YES  Sick/Strep Exposure: YES- spouse in the hospital with community pneumonia-(negative COVID)    Having both sinus and chest symptoms for two weeks.  Cough up some mucus and having pnd with sinus pain and pressure, not getting getting  better.  At night when lying down she has slight wheeze.  Has never had to use an inhaler before.  No fever or chills.  No chest pain pressure.    Therapies tried and outcome: rest and fluids      Review of Systems         Objective           Vitals:  No vitals were obtained today due to virtual visit.    Physical Exam   GENERAL: Healthy, alert and no distress  RESP: intermittent cough and sounding productive  PSYCH: Mentation appears normal, affect normal/bright, judgement and insight intact, normal speech and appearance well-groomed.                Video-Visit Details    Type of service:  Video Visit    Video End Time:n/a    Originating Location (pt. Location): Home    Distant Location (provider location):  [facility]     Platform used for Video Visit:     Telephone visit duration was 12 minutes.

## 2022-05-10 NOTE — TELEPHONE ENCOUNTER
Dr. Chaney,    S-(situation): cough    B-(background): started about 2 weeks ago.  Denies fever and SOA.   is presently in hosp with pneumonia.   tested neg for COVID.  Patient states she does have a slight wheeze occasionally.  She felt this was allergy orientated but has not tried anything for this illness.  Productive cough, no odor to mucus but is brownish yellow.  Non smoker.   legionaries history in 2020.  Per patient she is being proactive for her  coming home.  Patient speaks in complete sentences without SOA or gasping.    A-(assessment): cough    R-(recommendations): huddle with Dr. Chaney as patient scheduled for VRT.  No other appts available today for in office visit. Yumi MAURO RN

## 2022-05-24 ENCOUNTER — TELEPHONE (OUTPATIENT)
Dept: FAMILY MEDICINE | Facility: CLINIC | Age: 67
End: 2022-05-24
Payer: COMMERCIAL

## 2022-05-24 DIAGNOSIS — M17.0 PRIMARY OSTEOARTHRITIS OF BOTH KNEES: Primary | ICD-10-CM

## 2022-05-24 NOTE — TELEPHONE ENCOUNTER
Reason for Call:  Other prescription    Detailed comments: Pt is requesting the medication of Meloxicam 15 mg 1 tab daily as she got the medication previous from [facility].    Phar is [facility] [place]    Phone Number Patient can be reached at: Home number on file [phone] (home)    Best Time: any    Can we leave a detailed message on this number? YES    Call taken on 5/24/2022 at 3:24 PM by Yumi Victoria

## 2022-05-25 RX ORDER — MELOXICAM 15 MG/1
15 TABLET ORAL DAILY
Qty: 30 TABLET | Refills: 3 | Status: SHIPPED | OUTPATIENT
Start: 2022-05-25 | End: 2022-09-08

## 2022-05-25 NOTE — TELEPHONE ENCOUNTER
Pt stated she was having it filled through [facility], when she had a meniscus tear, and she is actively getting injections in the knee with them. They stated that they will not prescribe it anymore and if she wants to still get it filled, she will have to follow up with having it filled through her primary doctor. She stated you talked about her knee pain during a visit for well check on 2/1/2022. She would like to know if it is possible to fill it without being seen.     Mira Talley RN

## 2022-05-25 NOTE — TELEPHONE ENCOUNTER
Meloxicam 15 mg ordered. - use infrequently if able.   Take with food and stop if GI upset occurs.  SELAM Stein

## 2022-08-09 DIAGNOSIS — M51.369 DDD (DEGENERATIVE DISC DISEASE), LUMBAR: ICD-10-CM

## 2022-08-09 DIAGNOSIS — M17.0 PRIMARY OSTEOARTHRITIS OF BOTH KNEES: ICD-10-CM

## 2022-08-11 RX ORDER — GABAPENTIN 300 MG/1
CAPSULE ORAL
Qty: 60 CAPSULE | Refills: 3 | Status: SHIPPED | OUTPATIENT
Start: 2022-08-11 | End: 2023-02-07

## 2022-09-05 DIAGNOSIS — M17.0 PRIMARY OSTEOARTHRITIS OF BOTH KNEES: ICD-10-CM

## 2022-09-07 NOTE — TELEPHONE ENCOUNTER
"Requested Prescriptions   Pending Prescriptions Disp Refills    meloxicam (MOBIC) 15 MG tablet [Pharmacy Med Name: MELOXICAM 15MG TABLET] 30 tablet 3     Sig: Take 1 tablet (15 mg) by mouth daily        NSAID Medications Failed - 9/5/2022  1:03 AM        Failed - Normal ALT on file in past 12 months     No lab results found.            Failed - Normal AST on file in past 12 months     No lab results found.          Failed - Normal CBC on file in past 12 months     Recent Labs   Lab Test 06/26/17  1711   WBC 7.5   RBC 4.75   HGB 14.5   HCT 43.2                      Passed - Blood pressure under 140/90 in past 12 months       BP Readings from Last 3 Encounters:   02/01/22 126/80   04/20/21 128/78   10/23/19 132/71                 Passed - Recent (12 mo) or future (30 days) visit within the authorizing provider's specialty     Patient has had an office visit with the authorizing provider or a provider within the authorizing providers department within the previous 12 mos or has a future within next 30 days. See \"Patient Info\" tab in inbasket, or \"Choose Columns\" in Meds & Orders section of the refill encounter.              Passed - Patient is age 6-64 years        Passed - Medication is active on med list        Passed - No active pregnancy on record        Passed - Normal serum creatinine on file in past 12 months     Recent Labs   Lab Test 02/01/22  1147   CR 0.70       Ok to refill medication if creatinine is low          Passed - No positive pregnancy test in past 12 months              "

## 2022-09-08 RX ORDER — MELOXICAM 15 MG/1
15 TABLET ORAL DAILY
Qty: 30 TABLET | Refills: 3 | Status: SHIPPED | OUTPATIENT
Start: 2022-09-08 | End: 2023-01-26

## 2022-09-22 ENCOUNTER — TELEPHONE (OUTPATIENT)
Dept: FAMILY MEDICINE | Facility: CLINIC | Age: 67
End: 2022-09-22

## 2022-09-22 NOTE — TELEPHONE ENCOUNTER
Called pt. Unsure what pt needs done with this request. Pt was referred to [facility] from [facility] neurosurgery and per notes order was faxed by them. There is note pt was told by insurance that she could not go to [facility] or [place] and was checking to see if [facility] was covered.     Left message to return call      Iain Roy RN

## 2022-09-22 NOTE — TELEPHONE ENCOUNTER
Reason for call:    Symptom or request:     Patient called stating that she has an appt with Dr Marlon Ghotra a neurosurgeon for yearly MRA imaging regarding brain aneurysm, using cpt code [id number].    Has appt for 10/05/22- with [facility] for mra    [facility]med[facility] [phone] for notification that patient is going to [facility] (phone: [phone]-[place] location) for MRA due to service/ insurance.       Best Time:  Any, may mychart if you wish    Can we leave a detailed message on this number?  YES     Misty Chi

## 2022-09-23 NOTE — TELEPHONE ENCOUNTER
This request looks like it probably needs review by a referral coordinator.  Forwarding to their pool.     Hayley Healy RN  [facility]

## 2022-10-10 ENCOUNTER — ANESTHESIA EVENT (OUTPATIENT)
Dept: GASTROENTEROLOGY | Facility: CLINIC | Age: 67
End: 2022-10-10
Payer: COMMERCIAL

## 2022-10-10 ASSESSMENT — LIFESTYLE VARIABLES: TOBACCO_USE: 1

## 2022-10-10 NOTE — ANESTHESIA PREPROCEDURE EVALUATION
Anesthesia Pre-Procedure Evaluation    Patient: Aretha Hooks   MRN: [id number] : 1955        Procedure : Procedure(s):  COLONOSCOPY          Past Medical History:   Diagnosis Date     ASCUS of cervix with negative high risk HPV 3/2011    per [facility] repeat Pap + HPV cotesting in 3 years     Carcinoid tumor  of colon      Other vitamin B12 deficiency anemia     injection of B12 monthly     Unspecified essential hypertension       Past Surgical History:   Procedure Laterality Date     COLONOSCOPY N/A 11/10/2017    Procedure: COMBINED COLONOSCOPY, SINGLE OR MULTIPLE BIOPSY/POLYPECTOMY BY BIOPSY;  colonoscopy;  Surgeon: Emil Vaz MD;  Location: [facility]     SURGICAL HISTORY OF -       Cervical dilatation with curettage and hysteroscopy with rollerball endometrial ablation     SURGICAL HISTORY OF -   2004    Laparoscopic Cholecystectomy     SURGICAL HISTORY OF -       Cryocautery to cervix for bleeding     SURGICAL HISTORY OF -       endometrial ablation     SURGICAL HISTORY OF -   2008    carcinoid tumor rectum  [facility]     ZZC APPENDECTOMY  Age 12    Appendectomy     ZZC LIGATE FALLOPIAN TUBE  1984    Tubal Ligation     ZZ KNEE SCOPE,MED+LAT MENIS REPAIR  2009    left knee      No Known Allergies   Social History     Tobacco Use     Smoking status: Passive Smoke Exposure - Never Smoker     Smokeless tobacco: Never     Tobacco comments:     exposed to 's cigarette smoke   Substance Use Topics     Alcohol use: Yes     Comment: occ      Wt Readings from Last 1 Encounters:   19 81.6 kg (180 lb)        Anesthesia Evaluation   Pt has had prior anesthetic. Type: General and MAC.        ROS/MED HX  ENT/Pulmonary:     (+) tobacco use,     Neurologic: Comment: Brain aneurysm        Cardiovascular:     (+) Dyslipidemia hypertension-----    METS/Exercise Tolerance:     Hematologic:     (+) anemia,     Musculoskeletal: Comment: DDD  (+) arthritis,      GI/Hepatic:  - neg GI/hepatic ROS     Renal/Genitourinary:  - neg Renal ROS     Endo:     (+) Obesity,     Psychiatric/Substance Use:  - neg psychiatric ROS     Infectious Disease:  - neg infectious disease ROS     Malignancy:   (+) Malignancy, History of GI.GI CA status post Surgery.        Other:  - neg other ROS          Physical Exam    Airway        Mallampati: II   TM distance: > 3 FB   Neck ROM: full   Mouth opening: > 3 cm    Respiratory Devices and Support         Dental           Cardiovascular   cardiovascular exam normal          Pulmonary   pulmonary exam normal                OUTSIDE LABS:  CBC:   Lab Results   Component Value Date    WBC 7.5 06/26/2017    WBC 7.6 03/06/2009    HGB 14.5 06/26/2017    HGB 13.9 08/16/2011    HCT 43.2 06/26/2017    HCT 46.5 03/06/2009     06/26/2017     03/06/2009     BMP:   Lab Results   Component Value Date     02/01/2022     12/22/2020    POTASSIUM 3.6 02/01/2022    POTASSIUM 3.5 12/22/2020    CHLORIDE 106 02/01/2022    CHLORIDE 105 12/22/2020    CO2 30 02/01/2022    CO2 31 12/22/2020    BUN 13 02/01/2022    BUN 9 12/22/2020    CR 0.70 02/01/2022    CR 0.74 12/22/2020    GLC 96 02/01/2022    GLC 98 12/22/2020     COAGS:   Lab Results   Component Value Date    PTT 22 12/19/2003    INR 0.97 12/19/2003     POC: No results found for: BGM, HCG, HCGS  HEPATIC:   Lab Results   Component Value Date    ALBUMIN 2.7 (L) 01/26/2004    PROTTOTAL 6.1 01/26/2004    ALT 23 01/26/2004    AST 20 01/26/2004    ALKPHOS 63 01/26/2004    BILITOTAL 0.4 01/26/2004     OTHER:   Lab Results   Component Value Date    A1C 5.1 12/24/2012    АЛЕКСАНДР 9.5 02/01/2022    LIPASE 84 01/26/2004    AMYLASE <30 (L) 01/26/2004    TSH 1.12 02/11/2009       Anesthesia Plan    ASA Status:  3      Anesthesia Type: General.   Induction: Propofol.   Maintenance: TIVA.        Consents    Anesthesia Plan(s) and associated risks, benefits, and realistic alternatives discussed. Questions  answered and patient/representative(s) expressed understanding.     - Discussed: Risks, Benefits and Alternatives for BOTH SEDATION and the PROCEDURE were discussed     - Discussed with:  Patient         Postoperative Care    Pain management: IV analgesics, Oral pain medications, Multi-modal analgesia.   PONV prophylaxis: Ondansetron (or other 5HT-3), Dexamethasone or Solumedrol     Comments:                YARA Kwok CRNA

## 2022-10-12 ENCOUNTER — HOSPITAL ENCOUNTER (OUTPATIENT)
Facility: CLINIC | Age: 67
Discharge: HOME | End: 2022-10-12
Attending: SURGERY | Admitting: SURGERY
Payer: COMMERCIAL

## 2022-10-12 ENCOUNTER — ANESTHESIA (OUTPATIENT)
Dept: GASTROENTEROLOGY | Facility: CLINIC | Age: 67
End: 2022-10-12
Payer: COMMERCIAL

## 2022-10-12 VITALS
TEMPERATURE: 98.2 F | RESPIRATION RATE: 16 BRPM | BODY MASS INDEX: 29.99 KG/M2 | SYSTOLIC BLOOD PRESSURE: 135 MMHG | OXYGEN SATURATION: 97 % | WEIGHT: 180 LBS | HEIGHT: 65 IN | DIASTOLIC BLOOD PRESSURE: 83 MMHG | HEART RATE: 67 BPM

## 2022-10-12 LAB — COLONOSCOPY: NORMAL

## 2022-10-12 PROCEDURE — 250N000011 HC RX IP 250 OP 636: Performed by: NURSE ANESTHETIST, CERTIFIED REGISTERED

## 2022-10-12 PROCEDURE — G0105 COLORECTAL SCRN; HI RISK IND: HCPCS | Performed by: SURGERY

## 2022-10-12 PROCEDURE — 370N000017 HC ANESTHESIA TECHNICAL FEE, PER MIN: Performed by: SURGERY

## 2022-10-12 PROCEDURE — 45378 DIAGNOSTIC COLONOSCOPY: CPT | Performed by: SURGERY

## 2022-10-12 PROCEDURE — 258N000003 HC RX IP 258 OP 636: Performed by: SURGERY

## 2022-10-12 PROCEDURE — 250N000009 HC RX 250: Performed by: NURSE ANESTHETIST, CERTIFIED REGISTERED

## 2022-10-12 RX ORDER — NALOXONE HYDROCHLORIDE 0.4 MG/ML
0.2 INJECTION, SOLUTION INTRAMUSCULAR; INTRAVENOUS; SUBCUTANEOUS
Status: DISCONTINUED | OUTPATIENT
Start: 2022-10-12 | End: 2022-10-12 | Stop reason: HOSPADM

## 2022-10-12 RX ORDER — HYDROMORPHONE HCL IN WATER/PF 6 MG/30 ML
0.2 PATIENT CONTROLLED ANALGESIA SYRINGE INTRAVENOUS EVERY 5 MIN PRN
Status: DISCONTINUED | OUTPATIENT
Start: 2022-10-12 | End: 2022-10-12 | Stop reason: HOSPADM

## 2022-10-12 RX ORDER — LIDOCAINE HYDROCHLORIDE 10 MG/ML
INJECTION, SOLUTION EPIDURAL; INFILTRATION; INTRACAUDAL; PERINEURAL PRN
Status: DISCONTINUED | OUTPATIENT
Start: 2022-10-12 | End: 2022-10-12

## 2022-10-12 RX ORDER — FENTANYL CITRATE 50 UG/ML
25 INJECTION, SOLUTION INTRAMUSCULAR; INTRAVENOUS EVERY 5 MIN PRN
Status: DISCONTINUED | OUTPATIENT
Start: 2022-10-12 | End: 2022-10-12 | Stop reason: HOSPADM

## 2022-10-12 RX ORDER — SODIUM CHLORIDE, SODIUM LACTATE, POTASSIUM CHLORIDE, CALCIUM CHLORIDE 600; 310; 30; 20 MG/100ML; MG/100ML; MG/100ML; MG/100ML
INJECTION, SOLUTION INTRAVENOUS CONTINUOUS
Status: DISCONTINUED | OUTPATIENT
Start: 2022-10-12 | End: 2022-10-12 | Stop reason: HOSPADM

## 2022-10-12 RX ORDER — GLYCOPYRROLATE 0.2 MG/ML
INJECTION, SOLUTION INTRAMUSCULAR; INTRAVENOUS PRN
Status: DISCONTINUED | OUTPATIENT
Start: 2022-10-12 | End: 2022-10-12

## 2022-10-12 RX ORDER — ONDANSETRON 2 MG/ML
4 INJECTION INTRAMUSCULAR; INTRAVENOUS EVERY 30 MIN PRN
Status: DISCONTINUED | OUTPATIENT
Start: 2022-10-12 | End: 2022-10-12 | Stop reason: HOSPADM

## 2022-10-12 RX ORDER — NALOXONE HYDROCHLORIDE 0.4 MG/ML
0.4 INJECTION, SOLUTION INTRAMUSCULAR; INTRAVENOUS; SUBCUTANEOUS
Status: DISCONTINUED | OUTPATIENT
Start: 2022-10-12 | End: 2022-10-12 | Stop reason: HOSPADM

## 2022-10-12 RX ORDER — MEPERIDINE HYDROCHLORIDE 25 MG/ML
12.5 INJECTION INTRAMUSCULAR; INTRAVENOUS; SUBCUTANEOUS
Status: DISCONTINUED | OUTPATIENT
Start: 2022-10-12 | End: 2022-10-12 | Stop reason: HOSPADM

## 2022-10-12 RX ORDER — FENTANYL CITRATE 50 UG/ML
25 INJECTION, SOLUTION INTRAMUSCULAR; INTRAVENOUS
Status: DISCONTINUED | OUTPATIENT
Start: 2022-10-12 | End: 2022-10-12 | Stop reason: HOSPADM

## 2022-10-12 RX ORDER — FLUMAZENIL 0.1 MG/ML
0.2 INJECTION, SOLUTION INTRAVENOUS
Status: DISCONTINUED | OUTPATIENT
Start: 2022-10-12 | End: 2022-10-12 | Stop reason: HOSPADM

## 2022-10-12 RX ORDER — ONDANSETRON 4 MG/1
4 TABLET, ORALLY DISINTEGRATING ORAL EVERY 30 MIN PRN
Status: DISCONTINUED | OUTPATIENT
Start: 2022-10-12 | End: 2022-10-12 | Stop reason: HOSPADM

## 2022-10-12 RX ORDER — PROPOFOL 10 MG/ML
INJECTION, EMULSION INTRAVENOUS CONTINUOUS PRN
Status: DISCONTINUED | OUTPATIENT
Start: 2022-10-12 | End: 2022-10-12

## 2022-10-12 RX ORDER — PROPOFOL 10 MG/ML
INJECTION, EMULSION INTRAVENOUS PRN
Status: DISCONTINUED | OUTPATIENT
Start: 2022-10-12 | End: 2022-10-12

## 2022-10-12 RX ORDER — LIDOCAINE 40 MG/G
CREAM TOPICAL
Status: DISCONTINUED | OUTPATIENT
Start: 2022-10-12 | End: 2022-10-12 | Stop reason: HOSPADM

## 2022-10-12 RX ORDER — OXYCODONE HYDROCHLORIDE 5 MG/1
5 TABLET ORAL EVERY 4 HOURS PRN
Status: DISCONTINUED | OUTPATIENT
Start: 2022-10-12 | End: 2022-10-12 | Stop reason: HOSPADM

## 2022-10-12 RX ORDER — ONDANSETRON 2 MG/ML
4 INJECTION INTRAMUSCULAR; INTRAVENOUS
Status: DISCONTINUED | OUTPATIENT
Start: 2022-10-12 | End: 2022-10-12 | Stop reason: HOSPADM

## 2022-10-12 RX ADMIN — LIDOCAINE HYDROCHLORIDE 50 MG: 10 INJECTION, SOLUTION EPIDURAL; INFILTRATION; INTRACAUDAL; PERINEURAL at 10:20

## 2022-10-12 RX ADMIN — PROPOFOL 100 MG: 10 INJECTION, EMULSION INTRAVENOUS at 10:20

## 2022-10-12 RX ADMIN — PROPOFOL 200 MCG/KG/MIN: 10 INJECTION, EMULSION INTRAVENOUS at 10:20

## 2022-10-12 RX ADMIN — SODIUM CHLORIDE, POTASSIUM CHLORIDE, SODIUM LACTATE AND CALCIUM CHLORIDE: 600; 310; 30; 20 INJECTION, SOLUTION INTRAVENOUS at 10:09

## 2022-10-12 RX ADMIN — GLYCOPYRROLATE 0.1 MG: 0.2 INJECTION, SOLUTION INTRAMUSCULAR; INTRAVENOUS at 10:20

## 2022-10-12 ASSESSMENT — ACTIVITIES OF DAILY LIVING (ADL): ADLS_ACUITY_SCORE: 35

## 2022-10-12 NOTE — ANESTHESIA CARE TRANSFER NOTE
Patient: Aretha Hooks    Procedure: Procedure(s):  COLONOSCOPY       Diagnosis: Special screening for malignant neoplasm of colon [Z12.11]  Diagnosis Additional Information: No value filed.    Anesthesia Type:   General     Note:    Oropharynx: oropharynx clear of all foreign objects  Level of Consciousness: awake  Oxygen Supplementation: room air    Independent Airway: airway patency satisfactory and stable  Dentition: dentition unchanged  Vital Signs Stable: post-procedure vital signs reviewed and stable  Report to RN Given: handoff report given  Patient transferred to: Phase II    Handoff Report: Identifed the Patient, Identified the Reponsible Provider, Reviewed the pertinent medical history, Discussed the surgical course, Reviewed Intra-OP anesthesia mangement and issues during anesthesia, Set expectations for post-procedure period and Allowed opportunity for questions and acknowledgement of understanding      Vitals:  Vitals Value Taken Time   BP     Temp     Pulse     Resp     SpO2         Electronically Signed By: YARA Ramirez CRNA  October 12, 2022  10:37 AM

## 2022-10-12 NOTE — H&P
66 year old year old female here for colonoscopy for personal history of rectal carcinoid and polyps.  Last colonoscopy on 2017 showed single polyp.  No changes in stool.    Patient Active Problem List   Diagnosis     Essential hypertension with goal blood pressure less than 140/90     Other specified disorder of rectum and anus     Hypertriglyceridemia     Vitamin B12 deficiency (non anemic)     CARDIOVASCULAR SCREENING; LDL GOAL LESS THAN 130     Tubular adenoma of colon     Brain aneurysm     Sensory disorder     DDD (degenerative disc disease), lumbar     Primary osteoarthritis of both knees       Past Medical History:   Diagnosis Date     ASCUS of cervix with negative high risk HPV 3/2011    per [facility] repeat Pap + HPV cotesting in 3 years     Carcinoid tumor  of colon      Other vitamin B12 deficiency anemia     injection of B12 monthly     Unspecified essential hypertension 1990       Past Surgical History:   Procedure Laterality Date     COLONOSCOPY N/A 11/10/2017    Procedure: COMBINED COLONOSCOPY, SINGLE OR MULTIPLE BIOPSY/POLYPECTOMY BY BIOPSY;  colonoscopy;  Surgeon: Emil Vaz MD;  Location: [facility]     SURGICAL HISTORY OF -   2004    Cervical dilatation with curettage and hysteroscopy with rollerball endometrial ablation     SURGICAL HISTORY OF -   1/2004    Laparoscopic Cholecystectomy     SURGICAL HISTORY OF -   1988    Cryocautery to cervix for bleeding     SURGICAL HISTORY OF -   2004    endometrial ablation     SURGICAL HISTORY OF -   1/2008    carcinoid tumor rectum  [facility]     ZZC APPENDECTOMY  Age 12    Appendectomy     ZZC LIGATE FALLOPIAN TUBE  1984    [facility]ubal Ligation     [facility]    left knee       Family History   Problem Relation Age of Onset     Cancer Mother         Renal     Hypertension Father      Respiratory Father         uses cpap     Eye Disorder Father         cataracts     Hypertension Brother      Musculoskeletal  "Disorder Sister         back surg.     Skin Cancer Sister      Genitourinary Problems Sister      Cancer Sister         skin cancer     Gynecology Sister         hysterectomy     Skin Cancer Sister      C.A.D. Paternal Grandmother      Breast Cancer No family hx of      Cancer - colorectal No family hx of      Colon Cancer No family hx of        No current outpatient medications on file.       No Known Allergies    Pt reports that she is a non-smoker but has been exposed to tobacco smoke. She has never used smokeless tobacco. She reports current alcohol use. She reports that she does not use drugs.    Exam:  BP (!) 159/86   Temp 98.2  F (36.8  C)   Resp 16   Ht 1.651 m (5' 5\")   Wt 81.6 kg (180 lb)   LMP 04/01/2008   SpO2 98%   BMI 29.95 kg/m      Awake, Alert OX3  Lungs - CTA bilaterally  CV - RRR, no murmurs, distal pulses intact  Abd - soft, non-distended, non-tender, +BS  Extr - No cyanosis or edema    A/P 66 year old year old female in need of colonoscopy for  personal history of carcinoid and polyps. Risks, benefits, alternatives, and complications were discussed including the possibility of perforation, bleeding, missed lesion and the patient agreed to proceed.    Chet Riggs, DO on 10/12/2022 at 10:14 AM    "

## 2022-10-12 NOTE — ANESTHESIA POSTPROCEDURE EVALUATION
Patient: Aretha Hooks    Procedure: Procedure(s):  COLONOSCOPY       Anesthesia Type:  General    Note:  Disposition: Outpatient   Postop Pain Control: Uneventful            Sign Out: Well controlled pain   PONV: No   Neuro/Psych: Uneventful            Sign Out: Acceptable/Baseline neuro status   Airway/Respiratory: Uneventful            Sign Out: Acceptable/Baseline resp. status   CV/Hemodynamics: Uneventful            Sign Out: Acceptable CV status; No obvious hypovolemia; No obvious fluid overload   Other NRE: NONE   DID A NON-ROUTINE EVENT OCCUR? No           Last vitals:  Vitals Value Taken Time   /87 10/12/22 1036   Temp     Pulse 77 10/12/22 1036   Resp     SpO2 95 % 10/12/22 1041   Vitals shown include unvalidated device data.    Electronically Signed By: YARA Ramirez CRNA  October 12, 2022  10:41 AM

## 2022-11-29 ENCOUNTER — HOSPITAL ENCOUNTER (OUTPATIENT)
Dept: MAMMOGRAPHY | Facility: CLINIC | Age: 67
Discharge: HOME | End: 2022-11-29
Admitting: NURSE PRACTITIONER
Payer: COMMERCIAL

## 2022-11-29 DIAGNOSIS — Z12.31 VISIT FOR SCREENING MAMMOGRAM: ICD-10-CM

## 2022-11-29 PROCEDURE — 77067 SCR MAMMO BI INCL CAD: CPT

## 2022-12-09 ENCOUNTER — HOSPITAL ENCOUNTER (OUTPATIENT)
Dept: MRI IMAGING | Facility: CLINIC | Age: 67
Discharge: HOME | End: 2022-12-09
Admitting: NURSE PRACTITIONER
Payer: COMMERCIAL

## 2022-12-09 DIAGNOSIS — I67.1 BRAIN ANEURYSM: ICD-10-CM

## 2022-12-09 PROCEDURE — 70544 MR ANGIOGRAPHY HEAD W/O DYE: CPT

## 2023-01-23 DIAGNOSIS — M17.0 PRIMARY OSTEOARTHRITIS OF BOTH KNEES: ICD-10-CM

## 2023-01-26 RX ORDER — MELOXICAM 15 MG/1
TABLET ORAL
Qty: 30 TABLET | Refills: 3 | Status: SHIPPED | OUTPATIENT
Start: 2023-01-26 | End: 2024-04-25

## 2023-02-04 DIAGNOSIS — M17.0 PRIMARY OSTEOARTHRITIS OF BOTH KNEES: ICD-10-CM

## 2023-02-04 DIAGNOSIS — M51.369 DDD (DEGENERATIVE DISC DISEASE), LUMBAR: ICD-10-CM

## 2023-02-06 NOTE — TELEPHONE ENCOUNTER
Routing to ordering provider for consideration, not on refill protocol.           Lynne Layton     RN MSN

## 2023-02-07 RX ORDER — GABAPENTIN 300 MG/1
300 CAPSULE ORAL DAILY
Qty: 30 CAPSULE | Refills: 0 | Status: SHIPPED | OUTPATIENT
Start: 2023-02-07 | End: 2023-03-14

## 2023-02-07 NOTE — CONFIDENTIAL NOTE
Due for follow up - seen 1 year ago and started Gabapentin with recommendations to follow up in 1-2 months.  Given 1 month.    SELAM Stein

## 2023-02-07 NOTE — TELEPHONE ENCOUNTER
Left message to call clinic back.  Alejandrnia Shearer PSC on 2/7/2023 at 11:41 AM      Due for follow up - seen 1 year ago and started Gabapentin with recommendations to follow up in 1-2 months.  Given 1 month.     SELAM Stein         Unsigned   Note

## 2023-03-10 DIAGNOSIS — M17.0 PRIMARY OSTEOARTHRITIS OF BOTH KNEES: ICD-10-CM

## 2023-03-10 DIAGNOSIS — M51.369 DDD (DEGENERATIVE DISC DISEASE), LUMBAR: ICD-10-CM

## 2023-03-14 RX ORDER — GABAPENTIN 300 MG/1
300 CAPSULE ORAL DAILY
Qty: 30 CAPSULE | Refills: 0 | Status: SHIPPED | OUTPATIENT
Start: 2023-03-14 | End: 2023-03-31

## 2023-03-16 DIAGNOSIS — M17.0 PRIMARY OSTEOARTHRITIS OF BOTH KNEES: ICD-10-CM

## 2023-03-16 DIAGNOSIS — M51.369 DDD (DEGENERATIVE DISC DISEASE), LUMBAR: ICD-10-CM

## 2023-03-16 RX ORDER — GABAPENTIN 300 MG/1
300 CAPSULE ORAL DAILY
Qty: 30 CAPSULE | Refills: 0 | OUTPATIENT
Start: 2023-03-16

## 2023-03-16 NOTE — TELEPHONE ENCOUNTER
Medication Question or Refill        What medication are you calling about (include dose and sig)?:  Gabapentin (NEURONTIN) 300 MG TABLET    Preferred Pharmacy:  [id number] - [address] [address]  Phone: [phone] Fax: [phone]      Controlled Substance Agreement on file:   CSA -- Patient Level:    CSA: None found at the patient level.       Who prescribed the medication?: IVON     Do you need a refill? Yes    When did you use the medication last? NA    Patient offered an appointment? No    Do you have any questions or concerns?  No      Could we send this information to you in [employer] or would you prefer to receive a phone call?:   Patient would like to be contacted via [employer]

## 2023-03-28 DIAGNOSIS — I10 ESSENTIAL HYPERTENSION WITH GOAL BLOOD PRESSURE LESS THAN 140/90: ICD-10-CM

## 2023-03-28 DIAGNOSIS — E53.8 VITAMIN B12 DEFICIENCY (NON ANEMIC): ICD-10-CM

## 2023-03-28 DIAGNOSIS — E78.1 HYPERTRIGLYCERIDEMIA: Primary | ICD-10-CM

## 2023-03-28 RX ORDER — LABETALOL 100 MG/1
300 TABLET, FILM COATED ORAL 2 TIMES DAILY
Qty: 540 TABLET | Refills: 0 | Status: SHIPPED | OUTPATIENT
Start: 2023-03-28 | End: 2023-03-31

## 2023-03-28 RX ORDER — HYDROCHLOROTHIAZIDE 12.5 MG/1
12.5 CAPSULE ORAL EVERY MORNING
Qty: 90 CAPSULE | Refills: 0 | Status: SHIPPED | OUTPATIENT
Start: 2023-03-28 | End: 2023-03-31

## 2023-03-28 RX ORDER — LANOLIN ALCOHOL/MO/W.PET/CERES
1000 CREAM (GRAM) TOPICAL DAILY
Qty: 90 TABLET | Refills: 0 | Status: SHIPPED | OUTPATIENT
Start: 2023-03-28 | End: 2023-03-31

## 2023-03-28 RX ORDER — LOSARTAN POTASSIUM 50 MG/1
50 TABLET ORAL DAILY
Qty: 90 TABLET | Refills: 0 | Status: SHIPPED | OUTPATIENT
Start: 2023-03-28 | End: 2023-03-31

## 2023-03-29 NOTE — CONFIDENTIAL NOTE
Patient is informed of the refill and the need for ov and labs.  Patient will fast.  . Yuim MAURO RN

## 2023-03-31 ENCOUNTER — OFFICE VISIT (OUTPATIENT)
Dept: FAMILY MEDICINE | Facility: CLINIC | Age: 68
End: 2023-03-31
Payer: COMMERCIAL

## 2023-03-31 VITALS
TEMPERATURE: 97.3 F | OXYGEN SATURATION: 98 % | BODY MASS INDEX: 29.95 KG/M2 | RESPIRATION RATE: 18 BRPM | DIASTOLIC BLOOD PRESSURE: 72 MMHG | HEART RATE: 55 BPM | SYSTOLIC BLOOD PRESSURE: 128 MMHG | HEIGHT: 65 IN

## 2023-03-31 DIAGNOSIS — I10 ESSENTIAL HYPERTENSION WITH GOAL BLOOD PRESSURE LESS THAN 140/90: ICD-10-CM

## 2023-03-31 DIAGNOSIS — Z78.0 POST-MENOPAUSAL: ICD-10-CM

## 2023-03-31 DIAGNOSIS — E53.8 VITAMIN B12 DEFICIENCY (NON ANEMIC): ICD-10-CM

## 2023-03-31 DIAGNOSIS — E78.1 HYPERTRIGLYCERIDEMIA: ICD-10-CM

## 2023-03-31 DIAGNOSIS — M51.369 DDD (DEGENERATIVE DISC DISEASE), LUMBAR: ICD-10-CM

## 2023-03-31 DIAGNOSIS — M17.0 PRIMARY OSTEOARTHRITIS OF BOTH KNEES: ICD-10-CM

## 2023-03-31 DIAGNOSIS — Z00.00 ENCOUNTER FOR MEDICARE ANNUAL WELLNESS EXAM: Primary | ICD-10-CM

## 2023-03-31 LAB
ANION GAP SERPL CALCULATED.3IONS-SCNC: 11 MMOL/L (ref 7–15)
BUN SERPL-MCNC: 14 MG/DL (ref 8–23)
CALCIUM SERPL-MCNC: 10 MG/DL (ref 8.8–10.2)
CHLORIDE SERPL-SCNC: 105 MMOL/L (ref 98–107)
CHOLEST SERPL-MCNC: 159 MG/DL
CREAT SERPL-MCNC: 0.76 MG/DL (ref 0.51–0.95)
DEPRECATED HCO3 PLAS-SCNC: 28 MMOL/L (ref 22–29)
GFR SERPL CREATININE-BSD FRML MDRD: 86 ML/MIN/1.73M2
GLUCOSE SERPL-MCNC: 99 MG/DL (ref 70–99)
HDLC SERPL-MCNC: 63 MG/DL
LDLC SERPL CALC-MCNC: 77 MG/DL
NONHDLC SERPL-MCNC: 96 MG/DL
POTASSIUM SERPL-SCNC: 3.5 MMOL/L (ref 3.4–5.3)
SODIUM SERPL-SCNC: 144 MMOL/L (ref 136–145)
TRIGL SERPL-MCNC: 96 MG/DL

## 2023-03-31 PROCEDURE — 99214 OFFICE O/P EST MOD 30 MIN: CPT | Mod: 25 | Performed by: NURSE PRACTITIONER

## 2023-03-31 PROCEDURE — 80048 BASIC METABOLIC PNL TOTAL CA: CPT | Performed by: NURSE PRACTITIONER

## 2023-03-31 PROCEDURE — 99397 PER PM REEVAL EST PAT 65+ YR: CPT | Mod: 25 | Performed by: NURSE PRACTITIONER

## 2023-03-31 PROCEDURE — 36415 COLL VENOUS BLD VENIPUNCTURE: CPT | Performed by: NURSE PRACTITIONER

## 2023-03-31 PROCEDURE — 80061 LIPID PANEL: CPT | Performed by: NURSE PRACTITIONER

## 2023-03-31 PROCEDURE — 90677 PCV20 VACCINE IM: CPT | Performed by: NURSE PRACTITIONER

## 2023-03-31 PROCEDURE — G0009 ADMIN PNEUMOCOCCAL VACCINE: HCPCS | Performed by: NURSE PRACTITIONER

## 2023-03-31 RX ORDER — GABAPENTIN 300 MG/1
300 CAPSULE ORAL 3 TIMES DAILY
Qty: 270 CAPSULE | Refills: 3 | Status: SHIPPED | OUTPATIENT
Start: 2023-03-31 | End: 2024-04-25

## 2023-03-31 RX ORDER — LOSARTAN POTASSIUM 50 MG/1
50 TABLET ORAL DAILY
Qty: 90 TABLET | Refills: 3 | Status: SHIPPED | OUTPATIENT
Start: 2023-03-31 | End: 2024-01-16

## 2023-03-31 RX ORDER — HYDROCHLOROTHIAZIDE 12.5 MG/1
12.5 CAPSULE ORAL EVERY MORNING
Qty: 90 CAPSULE | Refills: 3 | Status: SHIPPED | OUTPATIENT
Start: 2023-03-31 | End: 2024-01-16

## 2023-03-31 RX ORDER — LANOLIN ALCOHOL/MO/W.PET/CERES
1000 CREAM (GRAM) TOPICAL DAILY
Qty: 90 TABLET | Refills: 3 | Status: SHIPPED | OUTPATIENT
Start: 2023-03-31 | End: 2024-01-16

## 2023-03-31 RX ORDER — LABETALOL 100 MG/1
300 TABLET, FILM COATED ORAL 2 TIMES DAILY
Qty: 540 TABLET | Refills: 3 | Status: SHIPPED | OUTPATIENT
Start: 2023-03-31 | End: 2024-02-12

## 2023-03-31 ASSESSMENT — ACTIVITIES OF DAILY LIVING (ADL): CURRENT_FUNCTION: NO ASSISTANCE NEEDED

## 2023-03-31 ASSESSMENT — ENCOUNTER SYMPTOMS
SHORTNESS OF BREATH: 0
PALPITATIONS: 0
HEMATURIA: 0
CHILLS: 0
COUGH: 0
MYALGIAS: 0
NAUSEA: 0
BREAST MASS: 0
JOINT SWELLING: 1
ARTHRALGIAS: 1
ABDOMINAL PAIN: 0
DYSURIA: 0
NERVOUS/ANXIOUS: 0
DIZZINESS: 0
EYE PAIN: 0
CONSTIPATION: 0
WEAKNESS: 0
HEADACHES: 0
DIARRHEA: 0
FREQUENCY: 0
PARESTHESIAS: 0
HEARTBURN: 0
SORE THROAT: 0
FEVER: 0
HEMATOCHEZIA: 0

## 2023-03-31 ASSESSMENT — PAIN SCALES - GENERAL: PAINLEVEL: NO PAIN (0)

## 2023-03-31 NOTE — PATIENT INSTRUCTIONS
Patient Education   Personalized Prevention Plan  You are due for the preventive services outlined below.  Your care team is available to assist you in scheduling these services.  If you have already completed any of these items, please share that information with your care team to update in your medical record.  Health Maintenance Due   Topic Date Due     Osteoporosis Screening  Never done     ANNUAL REVIEW OF HM ORDERS  Never done     HIV Screening  Never done     Hepatitis C Screening  Never done     Annual Wellness Visit  02/01/2023     Pneumococcal Vaccine (1 - PCV) 12/23/2022       Understanding USDA MyPlate  The USDA has guidelines to help you make healthy food choices. These are called MyPlate. MyPlate shows the food groups that make up healthy meals using the image of a place setting. Before you eat, think about the healthiest choices for what to put on your plate or in your cup or bowl. To learn more about building a healthy plate, visit www.choosemyplate.gov.     The food groups    Fruits. Any fruit or 100% fruit juice counts as part of the Fruit Group. Fruits may be fresh, canned, frozen, or dried, and may be whole, cut-up, or pureed. Make 1/2 of your plate fruits and vegetables.    Vegetables. Any vegetable or 100% vegetable juice counts as a member of the Vegetable Group. Vegetables may be fresh, frozen, canned, or dried. They can be served raw or cooked and may be whole, cut-up, or mashed. Make 1/2 of your plate fruits and vegetables.    Grains. All foods made from grains are part of the Grains Group. These include wheat, rice, oats, cornmeal, and barley. Grains are often used to make foods such as bread, pasta, oatmeal, cereal, tortillas, and grits. Grains should be no more than 1/4 of your plate. At least half of your grains should be whole grains.    Protein. This group includes meat, poultry, seafood, beans and peas, eggs, processed soy products (such as tofu), nuts (including nut butters), and  seeds. Make protein choices no more than 1/4 of your plate. Meat and poultry choices should be lean or low fat.    Dairy. The Dairy Group includes all fluid milk products and foods made from milk that contain calcium, such as yogurt and cheese. (Foods that have little calcium, such as cream, butter, and cream cheese, are not part of this group.) Most dairy choices should be low-fat or fat-free.    Oils. Oils aren't a food group, but they do contain essential nutrients. However it's important to watch your intake of oils. These are fats that are liquid at room temperature. They include canola, corn, olive, soybean, vegetable, and sunflower oil. Foods that are mainly oil include mayonnaise, certain salad dressings, and soft margarines. You likely already get your daily oil allowance from the foods you eat.  Things to limit  Eating healthy also means limiting these things in your diet:    Salt (sodium). Many processed foods have a lot of sodium. To keep sodium intake down, eat fresh vegetables, meats, poultry, and seafood when possible. Purchase low-sodium, reduced-sodium, or no-salt-added food products at the store. And don't add salt to your meals at home. Instead, season them with herbs and spices such as dill, oregano, cumin, and paprika. Or try adding flavor with lemon or lime zest and juice.    Saturated fat. Saturated fats are most often found in animal products such as beef, pork, and chicken. They are often solid at room temperature, such as butter. To reduce your saturated fat intake, choose leaner cuts of meat and poultry. And try healthier cooking methods such as grilling, broiling, roasting, or baking. For a simple lower-fat swap, use plain nonfat yogurt instead of mayonnaise when making potato salad or macaroni salad.    Added sugars. These are sugars added to foods. They are in foods such as ice cream, candy, soda, fruit drinks, sports drinks, energy drinks, cookies, pastries, jams, and syrups. Cut down  on added sugars by sharing sweet treats with a family member or friend. You can also choose fruit for dessert, and drink water or other unsweetened beverages.  [employer] last reviewed this educational content on 6/1/2020 2000-2022 [employer]. All rights reserved. This information is not intended as a substitute for professional medical care. Always follow your healthcare professional's instructions.

## 2023-03-31 NOTE — PROGRESS NOTES
"SUBJECTIVE:   Alice is a 67 year old who presents for Preventive Visit.  Additional Questions 3/31/2023   Roomed by Deepthi Lawton MA   Accompanied by Self      Patient has been advised of split billing requirements and indicates understanding: Yes  Are you in the first 12 months of your Medicare coverage?  No    Healthy Habits:     In general, how would you rate your overall health?  Excellent    Frequency of exercise:  6-7 days/week    Duration of exercise:  Greater than 60 minutes    Do you usually eat at least 4 servings of fruit and vegetables a day, include whole grains    & fiber and avoid regularly eating high fat or \"junk\" foods?  No    Taking medications regularly:  Yes    Barriers to taking medications:  None    Medication side effects:  None    Ability to successfully perform activities of daily living:  No assistance needed    Home Safety:  No safety concerns identified    Hearing Impairment:  No hearing concerns    In the past 6 months, have you been bothered by leaking of urine?  No    In general, how would you rate your overall mental or emotional health?  Excellent      PHQ-2 Total Score: 0    Additional concerns today:  No    Have you ever done Advance Care Planning? (For example, a Health Directive, POLST, or a discussion with a medical provider or your loved ones about your wishes): Yes, patient states has an Advance Care Planning document and will bring a copy to the clinic.    Fall risk  Fallen 2 or more times in the past year?: No  Any fall with injury in the past year?: No    Cognitive Screening   1) Repeat 3 items (Leader, Season, Table)    2) Clock draw: NORMAL  3) 3 item recall: Recalls 1 object   Results: NORMAL clock, 1-2 items recalled: COGNITIVE IMPAIRMENT LESS LIKELY    Mini-CogTM Copyright FAUSTO Mcintyre. Licensed by the author for use in [facility]; reprinted with permission (avis@.[facility]). All rights reserved.      Do you have sleep apnea, excessive snoring or daytime " drowsiness?: no    Reviewed and updated as needed this visit by clinical staff   Tobacco  Allergies  Meds              Reviewed and updated as needed this visit by Provider                 Social History     Tobacco Use     Smoking status: Never     Passive exposure: Yes     Smokeless tobacco: Never     Tobacco comments:     exposed to 's cigarette smoke   Substance Use Topics     Alcohol use: Yes     Comment: occ          Alcohol Use 3/31/2023   Prescreen: >3 drinks/day or >7 drinks/week? No     Do you have a current opioid prescription? No  Do you use any other controlled substances or medications that are not prescribed by a provider? None           Hyperlipidemia Follow-Up      Are you regularly taking any medication or supplement to lower your cholesterol?   No    Are you having muscle aches or other side effects that you think could be caused by your cholesterol lowering medication?  No    Hypertension Follow-up      Do you check your blood pressure regularly outside of the clinic? No     Are you following a low salt diet? Yes    Are your blood pressures ever more than 140 on the top number (systolic) OR more   than 90 on the bottom number (diastolic), for example 140/90? No      Current providers sharing in care for this patient include:   Patient Care Team:  Judy Jiménez NP as PCP - General (Nurse Practitioner - Family)  Judy Jiménez NP as Assigned PCP    The following health maintenance items are reviewed in Epic and correct as of today:  Health Maintenance   Topic Date Due     DEXA  Never done     ANNUAL REVIEW OF  ORDERS  Never done     HIV SCREENING  Never done     HEPATITIS C SCREENING  Never done     MEDICARE ANNUAL WELLNESS VISIT  02/01/2023     Pneumococcal Vaccine: 65+ Years (1 - PCV) 12/23/2022     FALL RISK ASSESSMENT  03/31/2024     MAMMO SCREENING  11/29/2024     DTAP/TDAP/TD IMMUNIZATION (3 - Td or Tdap) 12/10/2024     LIPID  02/01/2027     COLORECTAL CANCER  SCREENING  10/12/2027     ADVANCE CARE PLANNING  03/31/2028     PHQ-2 (once per calendar year)  Completed     INFLUENZA VACCINE  Completed     ZOSTER IMMUNIZATION  Completed     COVID-19 Vaccine  Completed     IPV IMMUNIZATION  Aged Out     MENINGITIS IMMUNIZATION  Aged Out     PAP  Discontinued     Lab work is in process  Labs reviewed in EPIC  BP Readings from Last 3 Encounters:   03/31/23 128/72   10/12/22 135/83   02/01/22 126/80    Wt Readings from Last 3 Encounters:   10/12/22 81.6 kg (180 lb)   09/28/19 81.6 kg (180 lb)   07/26/19 79.4 kg (175 lb)                  Patient Active Problem List   Diagnosis     Essential hypertension with goal blood pressure less than 140/90     Other specified disorder of rectum and anus     Hypertriglyceridemia     Vitamin B12 deficiency (non anemic)     CARDIOVASCULAR SCREENING; LDL GOAL LESS THAN 130     Tubular adenoma of colon     Brain aneurysm     Sensory disorder     DDD (degenerative disc disease), lumbar     Primary osteoarthritis of both knees     Past Surgical History:   Procedure Laterality Date     COLONOSCOPY N/A 11/10/2017    Procedure: COMBINED COLONOSCOPY, SINGLE OR MULTIPLE BIOPSY/POLYPECTOMY BY BIOPSY;  colonoscopy;  Surgeon: Emil Vaz MD;  Location: [facility]     COLONOSCOPY N/A 10/12/2022    Procedure: COLONOSCOPY;  Surgeon: Chet Riggs DO;  Location: [facility]     SURGICAL HISTORY OF -   01/01/2004    Cervical dilatation with curettage and hysteroscopy with rollerball endometrial ablation     SURGICAL HISTORY OF -   01/01/2004    Laparoscopic Cholecystectomy     SURGICAL HISTORY OF -   01/01/1988    Cryocautery to cervix for bleeding     SURGICAL HISTORY OF -   01/01/2004    endometrial ablation     SURGICAL HISTORY OF -   01/01/2008    carcinoid tumor rectum  [facility] Yeison Lawson     [facility] APPENDECTOMY  Age 12    Appendectomy     Z LIGATE FALLOPIAN TUBE  01/01/1984    Tubal Ligation     ZZ KNEE SCOPE,MED+LAT MENIS REPAIR   01/01/2009    left knee       Social History     Tobacco Use     Smoking status: Never     Passive exposure: Yes     Smokeless tobacco: Never     Tobacco comments:     exposed to 's cigarette smoke   Substance Use Topics     Alcohol use: Yes     Comment: occ     Family History   Problem Relation Age of Onset     Cancer Mother         Renal     Hypertension Father      Respiratory Father         uses cpap     Eye Disorder Father         cataracts     Hypertension Brother      Musculoskeletal Disorder Sister         back surg.     Skin Cancer Sister      Genitourinary Problems Sister      Cancer Sister         skin cancer     Gynecology Sister         hysterectomy     Skin Cancer Sister      C.A.D. Paternal Grandmother      Breast Cancer No family hx of      Cancer - colorectal No family hx of      Colon Cancer No family hx of          Current Outpatient Medications   Medication Sig Dispense Refill     CALTRATE 600 + D 600-200 MG-IU OR TABS 2 TABLET DAILY       cyanocobalamin (VITAMIN B-12) 1000 MCG tablet Take 1 tablet (1,000 mcg) by mouth daily 90 tablet 3     Fish Oil OIL 1 capsule twice daily - unknown dose       gabapentin (NEURONTIN) 300 MG capsule Take 1 capsule (300 mg) by mouth 3 times daily 270 capsule 3     hydrochlorothiazide (MICROZIDE) 12.5 MG capsule Take 1 capsule (12.5 mg) by mouth every morning 90 capsule 3     labetalol (NORMODYNE) 100 MG tablet Take 3 tablets (300 mg) by mouth 2 times daily 540 tablet 3     losartan (COZAAR) 50 MG tablet Take 1 tablet (50 mg) by mouth daily 90 tablet 3     meloxicam (MOBIC) 15 MG tablet TAKE 1 TABLET BY MOUTH DAILY 30 tablet 3     MULTIVITAMIN TABS   OR 1 TABLET DAILY       NEEDLES, ANY SIZE Use once a month for B12 injection (Patient not taking: Reported on 3/31/2023) 1 Box 1     Syringe, Disposable, (SYRINGE/CANNULA) 3 ML MISC 3 ml syringe (Patient not taking: Reported on 3/31/2023) 12 each 1     No Known Allergies  Recent Labs   Lab Test 02/01/22  1174  12/22/20  0713 06/08/19  0959 05/14/19  1151   LDL 56 63  --  24   HDL 61 73  --  52   TRIG 153* 115  --  129   CR 0.70 0.74 0.73 0.98   GFRESTIMATED >90 86 88 62   GFRESTBLACK  --  >90 >90 72   POTASSIUM 3.6 3.5 3.6 3.2*      Pneumonia Vaccine:For adults 65 years or older who do not have an immunocompromising condition, cerebrospinal fluid leak, or cochlear implant and want to receive PPSV23 ONLY: Administer 1 dose of PPSV23. Anyone who received any doses of PPSV23 before age 65 should receive 1 final dose of the vaccine at age 65 or older. Administer this last dose at least 5 years after the prior PPSV23 dose.  Mammogram Screening: Mammogram Screening: Recommended mammography every 1-2 years with patient discussion and risk factor consideration    Breast CA Risk Assessment (FHS-7) 2/1/2022   Do you have a family history of breast, colon, or ovarian cancer? No / Unknown         Mammogram Screening: Recommended mammography every 1-2 years with patient discussion and risk factor consideration  Pertinent mammograms are reviewed under the imaging tab.    Review of Systems   Constitutional: Negative for chills and fever.   HENT: Negative for congestion, ear pain, hearing loss and sore throat.    Eyes: Negative for pain and visual disturbance.   Respiratory: Negative for cough and shortness of breath.    Cardiovascular: Negative for chest pain, palpitations and peripheral edema.   Gastrointestinal: Negative for abdominal pain, constipation, diarrhea, heartburn, hematochezia and nausea.   Breasts:  Negative for tenderness, breast mass and discharge.   Genitourinary: Negative for dysuria, frequency, genital sores, hematuria, pelvic pain, urgency, vaginal bleeding and vaginal discharge.   Musculoskeletal: Positive for arthralgias and joint swelling. Negative for myalgias.   Skin: Negative for rash.   Neurological: Negative for dizziness, weakness, headaches and paresthesias.   Psychiatric/Behavioral: Negative for mood  "changes. The patient is not nervous/anxious.           OBJECTIVE:   /72   Pulse 55   Temp 97.3  F (36.3  C) (Tympanic)   Resp 18   Ht 1.651 m (5' 5\")   LMP 04/01/2008   SpO2 98%   BMI 29.95 kg/m   Estimated body mass index is 29.95 kg/m  as calculated from the following:    Height as of this encounter: 1.651 m (5' 5\").    Weight as of 10/12/22: 81.6 kg (180 lb).  Physical Exam  GENERAL: healthy, alert and no distress  EYES: Eyes grossly normal to inspection, PERRL and conjunctivae and sclerae normal  HENT: ear canals and TM's normal, nose and mouth without ulcers or lesions  NECK: no adenopathy, no asymmetry, masses, or scars and thyroid normal to palpation  RESP: lungs clear to auscultation - no rales, rhonchi or wheezes  CV: regular rate and rhythm, normal S1 S2, no S3 or S4, no murmur, click or rub, no peripheral edema and peripheral pulses strong  ABDOMEN: soft, nontender, no hepatosplenomegaly, no masses and bowel sounds normal  MS: no gross musculoskeletal defects noted, no edema  SKIN: no suspicious lesions or rashes  NEURO: Normal strength and tone, mentation intact and speech normal  PSYCH: mentation appears normal, affect normal/bright    Diagnostic Test Results:  Labs reviewed in Epic    ASSESSMENT / PLAN:   (Z00.00) Encounter for Medicare annual wellness exam  (primary encounter diagnosis)  Comment:    Plan: PRIMARY CARE FOLLOW-UP SCHEDULING             (I10) Essential hypertension with goal blood pressure less than 140/90  Comment:    Plan: hydrochlorothiazide (MICROZIDE) 12.5 MG         capsule, labetalol (NORMODYNE) 100 MG tablet,         losartan (COZAAR) 50 MG tablet           Controlled.  Refilled medications today. Due for labs.    (E53.8) Vitamin B12 deficiency (non anemic)  Comment:    Plan: cyanocobalamin (VITAMIN B-12) 1000 MCG tablet         Continue Vitamin B12    (M17.0) Primary osteoarthritis of both knees  Comment:    Plan: gabapentin (NEURONTIN) 300 MG capsule         " "    (M51.36) DDD (degenerative disc disease), lumbar  Comment:    Plan: gabapentin (NEURONTIN) 300 MG capsule          Increased Gabapentin to three times daily for chronic pain.    (E78.1) Hypertriglyceridemia  Comment:    Plan:   Due for Lipids - to be done today    (Z78.0) Post-menopausal  Comment:    Plan: DEXA HIP/PELVIS/SPINE - Future        Continue Vitamin D and Calcium      Patient has been advised of split billing requirements and indicates understanding: Yes      COUNSELING:  Reviewed preventive health counseling, as reflected in patient instructions       Regular exercise       Healthy diet/nutrition       Vision screening      BMI:   Estimated body mass index is 29.95 kg/m  as calculated from the following:    Height as of this encounter: 1.651 m (5' 5\").    Weight as of 10/12/22: 81.6 kg (180 lb).   Weight management plan: Discussed healthy diet and exercise guidelines      She reports that she has never smoked. She has been exposed to tobacco smoke. She has never used smokeless tobacco.      Appropriate preventive services were discussed with this patient, including applicable screening as appropriate for cardiovascular disease, diabetes, osteopenia/osteoporosis, and glaucoma.  As appropriate for age/gender, discussed screening for colorectal cancer, prostate cancer, breast cancer, and cervical cancer. Checklist reviewing preventive services available has been given to the patient.    Reviewed patients plan of care and provided an AVS. The Intermediate Care Plan ( asthma action plan, low back pain action plan, and migraine action plan) for Aretha meets the Care Plan requirement. This Care Plan has been established and reviewed with the Patient.       Judy Jiménez NP  [facility]     "

## 2023-04-27 ENCOUNTER — HOSPITAL ENCOUNTER (OUTPATIENT)
Dept: BONE DENSITY | Facility: CLINIC | Age: 68
Discharge: HOME | End: 2023-04-27
Attending: NURSE PRACTITIONER | Admitting: NURSE PRACTITIONER
Payer: COMMERCIAL

## 2023-04-27 DIAGNOSIS — Z78.0 POST-MENOPAUSAL: ICD-10-CM

## 2023-04-27 PROCEDURE — 77080 DXA BONE DENSITY AXIAL: CPT

## 2023-11-06 ENCOUNTER — PATIENT OUTREACH (OUTPATIENT)
Dept: GASTROENTEROLOGY | Facility: CLINIC | Age: 68
End: 2023-11-06
Payer: COMMERCIAL

## 2023-11-28 ENCOUNTER — IMMUNIZATION (OUTPATIENT)
Dept: FAMILY MEDICINE | Facility: CLINIC | Age: 68
End: 2023-11-28
Payer: COMMERCIAL

## 2023-11-28 PROCEDURE — 90662 IIV NO PRSV INCREASED AG IM: CPT

## 2023-11-28 PROCEDURE — G0008 ADMIN INFLUENZA VIRUS VAC: HCPCS

## 2023-12-04 ENCOUNTER — HOSPITAL ENCOUNTER (OUTPATIENT)
Dept: MAMMOGRAPHY | Facility: CLINIC | Age: 68
Discharge: HOME | End: 2023-12-04
Attending: NURSE PRACTITIONER | Admitting: NURSE PRACTITIONER
Payer: COMMERCIAL

## 2023-12-04 DIAGNOSIS — Z12.31 VISIT FOR SCREENING MAMMOGRAM: ICD-10-CM

## 2023-12-04 PROCEDURE — 77067 SCR MAMMO BI INCL CAD: CPT

## 2024-01-16 ENCOUNTER — MYC REFILL (OUTPATIENT)
Dept: FAMILY MEDICINE | Facility: CLINIC | Age: 69
End: 2024-01-16
Payer: COMMERCIAL

## 2024-01-16 DIAGNOSIS — I10 ESSENTIAL HYPERTENSION WITH GOAL BLOOD PRESSURE LESS THAN 140/90: ICD-10-CM

## 2024-01-16 DIAGNOSIS — E53.8 VITAMIN B12 DEFICIENCY (NON ANEMIC): ICD-10-CM

## 2024-01-17 RX ORDER — LOSARTAN POTASSIUM 50 MG/1
50 TABLET ORAL DAILY
Qty: 90 TABLET | Refills: 0 | Status: SHIPPED | OUTPATIENT
Start: 2024-01-17 | End: 2024-04-16

## 2024-01-17 RX ORDER — HYDROCHLOROTHIAZIDE 12.5 MG/1
12.5 CAPSULE ORAL EVERY MORNING
Qty: 90 CAPSULE | Refills: 0 | Status: SHIPPED | OUTPATIENT
Start: 2024-01-17 | End: 2024-04-16

## 2024-01-17 RX ORDER — LANOLIN ALCOHOL/MO/W.PET/CERES
1000 CREAM (GRAM) TOPICAL DAILY
Qty: 90 TABLET | Refills: 0 | Status: SHIPPED | OUTPATIENT
Start: 2024-01-17

## 2024-01-30 ENCOUNTER — TRANSFERRED RECORDS (OUTPATIENT)
Dept: HEALTH INFORMATION MANAGEMENT | Facility: CLINIC | Age: 69
End: 2024-01-30
Payer: COMMERCIAL

## 2024-02-12 ENCOUNTER — MYC REFILL (OUTPATIENT)
Dept: FAMILY MEDICINE | Facility: CLINIC | Age: 69
End: 2024-02-12
Payer: COMMERCIAL

## 2024-02-12 DIAGNOSIS — I10 ESSENTIAL HYPERTENSION WITH GOAL BLOOD PRESSURE LESS THAN 140/90: ICD-10-CM

## 2024-02-12 RX ORDER — LABETALOL 100 MG/1
300 TABLET, FILM COATED ORAL 2 TIMES DAILY
Qty: 540 TABLET | Refills: 0 | Status: SHIPPED | OUTPATIENT
Start: 2024-02-12 | End: 2024-04-25

## 2024-03-01 ENCOUNTER — PATIENT OUTREACH (OUTPATIENT)
Dept: CARE COORDINATION | Facility: CLINIC | Age: 69
End: 2024-03-01
Payer: COMMERCIAL

## 2024-03-15 ENCOUNTER — PATIENT OUTREACH (OUTPATIENT)
Dept: CARE COORDINATION | Facility: CLINIC | Age: 69
End: 2024-03-15
Payer: COMMERCIAL

## 2024-04-15 DIAGNOSIS — I10 ESSENTIAL HYPERTENSION WITH GOAL BLOOD PRESSURE LESS THAN 140/90: ICD-10-CM

## 2024-04-16 RX ORDER — HYDROCHLOROTHIAZIDE 12.5 MG/1
1 CAPSULE ORAL EVERY MORNING
Qty: 90 CAPSULE | Refills: 0 | Status: SHIPPED | OUTPATIENT
Start: 2024-04-16 | End: 2024-04-25

## 2024-04-16 RX ORDER — LOSARTAN POTASSIUM 50 MG/1
50 TABLET ORAL DAILY
Qty: 90 TABLET | Refills: 0 | Status: SHIPPED | OUTPATIENT
Start: 2024-04-16 | End: 2024-04-25

## 2024-04-22 SDOH — HEALTH STABILITY: PHYSICAL HEALTH: ON AVERAGE, HOW MANY DAYS PER WEEK DO YOU ENGAGE IN MODERATE TO STRENUOUS EXERCISE (LIKE A BRISK WALK)?: 7 DAYS

## 2024-04-22 SDOH — HEALTH STABILITY: PHYSICAL HEALTH: ON AVERAGE, HOW MANY MINUTES DO YOU ENGAGE IN EXERCISE AT THIS LEVEL?: 40 MIN

## 2024-04-22 ASSESSMENT — SOCIAL DETERMINANTS OF HEALTH (SDOH): HOW OFTEN DO YOU GET TOGETHER WITH FRIENDS OR RELATIVES?: ONCE A WEEK

## 2024-04-25 ENCOUNTER — OFFICE VISIT (OUTPATIENT)
Dept: FAMILY MEDICINE | Facility: CLINIC | Age: 69
End: 2024-04-25
Payer: COMMERCIAL

## 2024-04-25 VITALS
DIASTOLIC BLOOD PRESSURE: 80 MMHG | TEMPERATURE: 98.4 F | SYSTOLIC BLOOD PRESSURE: 132 MMHG | BODY MASS INDEX: 30.66 KG/M2 | OXYGEN SATURATION: 96 % | RESPIRATION RATE: 20 BRPM | HEIGHT: 64 IN | HEART RATE: 59 BPM

## 2024-04-25 DIAGNOSIS — G89.29 CHRONIC BILATERAL LOW BACK PAIN WITH RIGHT-SIDED SCIATICA: ICD-10-CM

## 2024-04-25 DIAGNOSIS — M54.41 CHRONIC BILATERAL LOW BACK PAIN WITH RIGHT-SIDED SCIATICA: ICD-10-CM

## 2024-04-25 DIAGNOSIS — I10 ESSENTIAL HYPERTENSION WITH GOAL BLOOD PRESSURE LESS THAN 140/90: ICD-10-CM

## 2024-04-25 DIAGNOSIS — M51.369 DDD (DEGENERATIVE DISC DISEASE), LUMBAR: ICD-10-CM

## 2024-04-25 DIAGNOSIS — Z00.00 ENCOUNTER FOR MEDICARE ANNUAL WELLNESS EXAM: Primary | ICD-10-CM

## 2024-04-25 DIAGNOSIS — E78.1 HYPERTRIGLYCERIDEMIA: ICD-10-CM

## 2024-04-25 DIAGNOSIS — M17.0 PRIMARY OSTEOARTHRITIS OF BOTH KNEES: ICD-10-CM

## 2024-04-25 DIAGNOSIS — I67.1 BRAIN ANEURYSM: ICD-10-CM

## 2024-04-25 LAB
ANION GAP SERPL CALCULATED.3IONS-SCNC: 10 MMOL/L (ref 7–15)
BUN SERPL-MCNC: 11.4 MG/DL (ref 8–23)
CALCIUM SERPL-MCNC: 10 MG/DL (ref 8.8–10.2)
CHLORIDE SERPL-SCNC: 104 MMOL/L (ref 98–107)
CHOLEST SERPL-MCNC: 147 MG/DL
CREAT SERPL-MCNC: 0.83 MG/DL (ref 0.51–0.95)
DEPRECATED HCO3 PLAS-SCNC: 27 MMOL/L (ref 22–29)
EGFRCR SERPLBLD CKD-EPI 2021: 77 ML/MIN/1.73M2
FASTING STATUS PATIENT QL REPORTED: YES
GLUCOSE SERPL-MCNC: 98 MG/DL (ref 70–99)
HDLC SERPL-MCNC: 61 MG/DL
LDLC SERPL CALC-MCNC: 69 MG/DL
NONHDLC SERPL-MCNC: 86 MG/DL
POTASSIUM SERPL-SCNC: 3.9 MMOL/L (ref 3.4–5.3)
SODIUM SERPL-SCNC: 141 MMOL/L (ref 135–145)
TRIGL SERPL-MCNC: 86 MG/DL

## 2024-04-25 PROCEDURE — 36415 COLL VENOUS BLD VENIPUNCTURE: CPT | Performed by: NURSE PRACTITIONER

## 2024-04-25 PROCEDURE — 80048 BASIC METABOLIC PNL TOTAL CA: CPT | Performed by: NURSE PRACTITIONER

## 2024-04-25 PROCEDURE — G0438 PPPS, INITIAL VISIT: HCPCS | Performed by: NURSE PRACTITIONER

## 2024-04-25 PROCEDURE — 99214 OFFICE O/P EST MOD 30 MIN: CPT | Mod: 25 | Performed by: NURSE PRACTITIONER

## 2024-04-25 PROCEDURE — 80061 LIPID PANEL: CPT | Performed by: NURSE PRACTITIONER

## 2024-04-25 RX ORDER — RESPIRATORY SYNCYTIAL VIRUS VACCINE 120MCG/0.5
0.5 KIT INTRAMUSCULAR ONCE
Qty: 1 EACH | Refills: 0 | Status: CANCELLED | OUTPATIENT
Start: 2024-04-25 | End: 2024-04-25

## 2024-04-25 RX ORDER — LOSARTAN POTASSIUM 50 MG/1
50 TABLET ORAL DAILY
Qty: 90 TABLET | Refills: 3 | Status: SHIPPED | OUTPATIENT
Start: 2024-04-25

## 2024-04-25 RX ORDER — GABAPENTIN 300 MG/1
300 CAPSULE ORAL 3 TIMES DAILY
Qty: 270 CAPSULE | Refills: 3 | Status: SHIPPED | OUTPATIENT
Start: 2024-04-25

## 2024-04-25 RX ORDER — LABETALOL 100 MG/1
300 TABLET, FILM COATED ORAL 2 TIMES DAILY
Qty: 540 TABLET | Refills: 3 | Status: SHIPPED | OUTPATIENT
Start: 2024-04-25

## 2024-04-25 RX ORDER — HYDROCHLOROTHIAZIDE 12.5 MG/1
1 CAPSULE ORAL EVERY MORNING
Qty: 90 CAPSULE | Refills: 3 | Status: SHIPPED | OUTPATIENT
Start: 2024-04-25

## 2024-04-25 ASSESSMENT — PAIN SCALES - GENERAL: PAINLEVEL: MODERATE PAIN (4)

## 2024-04-25 NOTE — PROGRESS NOTES
Preventive Care Visit  [facility]  Judy Jiménez DNP, Family Medicine  Apr 25, 2024      Assessment & Plan     (Z00.00) Encounter for Medicare annual wellness exam  (primary encounter diagnosis)  Comment: You had your wellness exam today.   Plan: Keep up the great work eating healthy and exercising! Try and get a diet full of fruits, vegetables, lean proteins, and whole grains. Drink plenty of water. You can get your RSV and Flu vaccines this Fall at the pharmacy.     (M54.41,  G89.29) Chronic bilateral low back pain with right-sided sciatica  Comment: Low back pain with sciatica worsening. Currently takes 300mg Gabapentin 3 times a day  Plan: Spine  Referral           (I10) Essential hypertension with goal blood pressure less than 140/90  Comment: BP was slightly elevated at beginning of the appointment. It was 132/80 on recheck. She reports BP being 110-120/70s when she checks at home.   Plan: hydrochlorothiazide (MICROZIDE) 12.5 MG         capsule, labetalol (NORMODYNE) 100 MG tablet,         losartan (COZAAR) 50 MG tablet, Basic metabolic        panel  (Ca, Cl, CO2, Creat, Gluc, K, Na, BUN)          (E78.1) Hypertriglyceridemia  Comment: Part of wellness check is screening for high cholesterol   Plan: Lipid panel reflex to direct LDL Non-fasting           (M17.0) Primary osteoarthritis of both knees  Comment: Continue following up with ortho. I am glad the rooster comb injections are working! Please reach out if knee pain is worsening.   Plan: gabapentin (NEURONTIN) 300 MG capsule            (M51.36) DDD (degenerative disc disease), lumbar  Comment: Worsening low back pain with sciatica.   Plan: gabapentin (NEURONTIN) 300 MG capsule, Spine          Referral           (I67.1) Brain aneurysm  Comment: History of brain aneurysm. Patient denies headache, SOB, chest pain, or dizziness.   Plan: Please call and schedule follow up appointment and MRI with the  neurosurgeon. The number is provided on the after visit summary.     Seasonal Allergies: Can try over the counter fluticasone (Flonase) daily until everything blooms outside. You could also try saline flushes 1-2 times a day. Reach out if your allergy symptoms worsen or do not get better.       Patient has been advised of split billing requirements and indicates understanding: Yes         Counseling  Appropriate preventive services were discussed with this patient, including applicable screening as appropriate for fall prevention, nutrition, physical activity, Tobacco-use cessation, weight loss and cognition.  Checklist reviewing preventive services available has been given to the patient.  Reviewed patient's diet, addressing concerns and/or questions.       See Patient Instructions    Carlos Jenkins is a 68 year old, presenting for the following:  Physical, Hypertension (Renew meds), and Arthritis (Renew meds)        4/25/2024    10:40 AM   Additional Questions   Roomed by Hay BLACK CMA   Accompanied by self         4/25/2024    10:40 AM   Patient Reported Additional Medications   Patient reports taking the following new medications no new meds     Health Care Directive  Patient does not have a Health Care Directive or Living Will: Discussed advance care planning with patient; however, patient declined at this time.    HPI    Patient had a home health nurse visit her in the home. A1c was 5.1% and BP was 128/70. Patient reports BP being in the 110-120/70s when she checks it at home. She did not get her cholesterol checked due to the machine not working.     Left Knee:   Chronic left knee pain caused by arthritis from torn meniscus and surgery in 2009. She gets rooster comb injections every 6 months and has relief for about 5 months. 1 month leading up to the next injection she takes it easy because of the pain. Movement makes the pain worse. Gabapentin helps but does not completely take the pain away.   She reports  an active lifestyle with going on the treadmill daily. She enjoys taking walks outside when it is nice outside but is not able to walk as much as she'd like: Walks about 40 minutes but has to stop due to low back pain and ongoing sciatica.     Treadmill every morning with no issues. Walking 40 minutes and get sciatica pain. Takes Gabapentin which helps some.   Left knee. Tore meniscus and had surgery in 2009. Getting injections every 6 months. Depending on activity it will last about 5 months. No issues with right knee.     Seasonal allergies:  Patient reports having congested sinuses this past week making it difficult to breath with CPAP on at night. She denies recent illnesses, sinus pain, cough, fever, or runny nose. She has not tried anything to make it better.     Hypertension Follow-up    Do you check your blood pressure regularly outside of the clinic? No   Only checks it every 6 weeks when donating blood   Are you following a low salt diet? Yes  Are your blood pressures ever more than 140 on the top number (systolic) OR more   than 90 on the bottom number (diastolic), for example 140/90? No    Home reading with RN was 130/70's.    Medication Followup of Gabapentin   Taking Medication as prescribed: yes  Side Effects:  None  Medication Helping Symptoms:  yes  History of chronic back pain - reports pain worsening over the past year.  Worse with walking, bending, overuse, shoveling.  Reports pain better with rest and sitting.    Reports pain will radiate down into right thigh/leg with associated numbness to knee.    Denies weakness.    Last MRI completed in Jan 2021:  IMPRESSION:  1. Overall, no significant change in multilevel degenerative disc  disease and facet arthropathy of the lumbar spine compared to MRI of  10/7/2019.  2. Mild multilevel lateral recess stenosis, seen on the left at L3-L4  and bilaterally at L4-L5. No significant central spinal canal  stenosis.  3. Mild to moderate bilateral neural  foraminal stenosis at L4-L5 and  mild-to-moderate left neural foraminal stenosis at L3-L4.    Has multiple steroid injections into the back that reports a successful.      History of brain aneurysm - monitoring this with serial MRI's last done in 2022 that appeared stable.  Following with Neurosurgery for this.        4/22/2024   General Health   How would you rate your overall physical health? Good   Feel stress (tense, anxious, or unable to sleep) Not at all         4/22/2024   Nutrition   Diet: Regular (no restrictions)         4/22/2024   Exercise   Days per week of moderate/strenous exercise 7 days   Average minutes spent exercising at this level 40 min         4/22/2024   Social Factors   Frequency of gathering with friends or relatives Once a week   Worry food won't last until get money to buy more No   Food not last or not have enough money for food? No   Do you have housing?  Yes   Are you worried about losing your housing? No   Lack of transportation? No   Unable to get utilities (heat,electricity)? No         4/25/2024   Fall Risk   Fallen 2 or more times in the past year? No   Trouble with walking or balance? No          4/22/2024   Activities of Daily Living- Home Safety   Needs help with the following daily activites None of the above   Safety concerns in the home None of the above         4/22/2024   Dental   Dentist two times every year? Yes         4/22/2024   Hearing Screening   Hearing concerns? None of the above         4/22/2024   Driving Risk Screening   Patient/family members have concerns about driving No         4/22/2024   General Alertness/Fatigue Screening   Have you been more tired than usual lately? No         4/22/2024   Urinary Incontinence Screening   Bothered by leaking urine in past 6 months No         4/22/2024   TB Screening   Were you born outside of the [place]? No         Today's PHQ-2 Score:       4/25/2024    10:35 AM   PHQ-2 ( 1999 Pf[employer])   Q1: Little interest or pleasure in  doing things 0   Q2: Feeling down, depressed or hopeless 0   PHQ-2 Score 0   Q1: Little interest or pleasure in doing things Not at all   Q2: Feeling down, depressed or hopeless Not at all   PHQ-2 Score 0           2024   Substance Use   Alcohol more than 3/day or more than 7/wk No   Do you have a current opioid prescription? No   How severe/bad is pain from 1 to 10? 4/10   Do you use any other substances recreationally? No     Social History     Tobacco Use    Smoking status: Never     Passive exposure: Yes    Smokeless tobacco: Never    Tobacco comments:     exposed to 's cigarette smoke   Vaping Use    Vaping status: Never Used   Substance Use Topics    Alcohol use: Yes     Comment: occ    Drug use: No           2023   LAST FHS-7 RESULTS   1st degree relative breast or ovarian cancer No   Any relative bilateral breast cancer No   Any male have breast cancer No   Any ONE woman have BOTH breast AND ovarian cancer No   Any woman with breast cancer before 50yrs No   2 or more relatives with breast AND/OR ovarian cancer No   2 or more relatives with breast AND/OR bowel cancer No     Mammogram Screening - Mammogram every 1-2 years updated in Health Maintenance based on mutual decision making    ASCVD Risk   The 10-year ASCVD risk score (Fiordaliza RIZVI, et al., 2019) is: 8.2%    Values used to calculate the score:      Age: 68 years      Sex: Female      Is Non- : No      Diabetic: No      Tobacco smoker: No      Systolic Blood Pressure: 138 mmHg      Is BP treated: Yes      HDL Cholesterol: 63 mg/dL      Total Cholesterol: 159 mg/dL    Fracture Risk Assessment Tool  Link to Frax Calculator  Use the information below to complete the Frax calculator  : 1955  Sex: female  Weight (kg): Patient weight not available.  Height (cm): 163.2 cm  Previous Fragility Fracture:  No  History of parent with fractured hip:  No  Current Smoking:  No  Patient has been on  glucocorticoids for more than 3 months (5mg/day or more): No  Rheumatoid Arthritis on Problem List:  No  Secondary Osteoporosis on Problem List:  No  Consumes 3 or more units of alcohol per day: No  Femoral Neck BMD (g/cm2)       Reviewed and updated as needed this visit by Provider                    Past Medical History:   Diagnosis Date    Arthritis 10 years    Back    ASCUS of cervix with negative high risk HPV 2011    per [facility] repeat Pap + HPV cotesting in 3 years    Carcinoid tumor  of colon     Other vitamin B12 deficiency anemia     injection of B12 monthly    Unspecified essential hypertension      Past Surgical History:   Procedure Laterality Date    CHOLECYSTECTOMY  17 years    COLONOSCOPY N/A 11/10/2017    Procedure: COMBINED COLONOSCOPY, SINGLE OR MULTIPLE BIOPSY/POLYPECTOMY BY BIOPSY;  colonoscopy;  Surgeon: Emil Vaz MD;  Location: [facility]    COLONOSCOPY N/A 10/12/2022    Procedure: COLONOSCOPY;  Surgeon: Chet Riggs DO;  Location: [facility]    SURGICAL HISTORY OF -   2004    Cervical dilatation with curettage and hysteroscopy with rollerball endometrial ablation    SURGICAL HISTORY OF -   2004    Laparoscopic Cholecystectomy    SURGICAL HISTORY OF -   1988    Cryocautery to cervix for bleeding    SURGICAL HISTORY OF -   2004    endometrial ablation    SURGICAL HISTORY OF -   2008    carcinoid tumor rectum  [facility]    ZZC APPENDECTOMY  Age 12    Appendectomy    ZZ LIGATE FALLOPIAN TUBE  1984    Tubal Ligation    ZZ KNEE SCOPE,MED+LAT MENIS REPAIR  2009    left knee     OB History    Para Term  AB Living   4 4 0 0 0 4   SAB IAB Ectopic Multiple Live Births   0 0 0 0 0      # Outcome Date GA Lbr Tyron/2nd Weight Sex Type Anes PTL Lv   4 Para            3 Para            2 Para            1 Para              Lab work is in process  Labs reviewed in EPIC  BP Readings from Last 3  Encounters:   04/25/24 132/80   03/31/23 128/72   10/12/22 135/83    Wt Readings from Last 3 Encounters:   10/12/22 81.6 kg (180 lb)   09/28/19 81.6 kg (180 lb)   07/26/19 79.4 kg (175 lb)                  Patient Active Problem List   Diagnosis    Essential hypertension with goal blood pressure less than 140/90    Other specified disorder of rectum and anus    Hypertriglyceridemia    Vitamin B12 deficiency (non anemic)    CARDIOVASCULAR SCREENING; LDL GOAL LESS THAN 130    Tubular adenoma of colon    Brain aneurysm    Sensory disorder    DDD (degenerative disc disease), lumbar    Primary osteoarthritis of both knees     Past Surgical History:   Procedure Laterality Date    CHOLECYSTECTOMY  17 years    COLONOSCOPY N/A 11/10/2017    Procedure: COMBINED COLONOSCOPY, SINGLE OR MULTIPLE BIOPSY/POLYPECTOMY BY BIOPSY;  colonoscopy;  Surgeon: Emil Vaz MD;  Location: [facility]    COLONOSCOPY N/A 10/12/2022    Procedure: COLONOSCOPY;  Surgeon: Chet Riggs DO;  Location: [facility]    SURGICAL HISTORY OF -   01/01/2004    Cervical dilatation with curettage and hysteroscopy with rollerball endometrial ablation    SURGICAL HISTORY OF -   01/01/2004    Laparoscopic Cholecystectomy    SURGICAL HISTORY OF -   01/01/1988    Cryocautery to cervix for bleeding    SURGICAL HISTORY OF -   01/01/2004    endometrial ablation    SURGICAL HISTORY OF -   01/01/2008    carcinoid tumor rectum  [facility]    Z APPENDECTOMY  Age 12    Appendectomy    Z LIGATE FALLOPIAN TUBE  01/01/1984    Tubal Ligation    [facility] KNEE SCOPE,MED+LAT MENIS REPAIR  01/01/2009    left knee       Social History     Tobacco Use    Smoking status: Never     Passive exposure: Yes    Smokeless tobacco: Never    Tobacco comments:     exposed to 's cigarette smoke   Substance Use Topics    Alcohol use: Yes     Comment: occ     Family History   Problem Relation Age of Onset    Cancer Mother         Renal    Hypertension Father      Respiratory Father         uses cpap    Eye Disorder Father         cataracts    Hypertension Brother     Musculoskeletal Disorder Sister         back surg.    Skin Cancer Sister     Genitourinary Problems Sister     Cancer Sister         skin cancer    Gynecology Sister         hysterectomy    Skin Cancer Sister     C.A.D. Paternal Grandmother     Breast Cancer No family hx of     Cancer - colorectal No family hx of     Colon Cancer No family hx of          Current Outpatient Medications   Medication Sig Dispense Refill    CALTRATE 600 + D 600-200 MG-IU OR TABS 2 TABLET DAILY      cyanocobalamin (VITAMIN B-12) 1000 MCG tablet Take 1 tablet (1,000 mcg) by mouth daily 90 tablet 0    Fish Oil OIL 1 capsule twice daily - unknown dose      gabapentin (NEURONTIN) 300 MG capsule Take 1 capsule (300 mg) by mouth 3 times daily 270 capsule 3    hydrochlorothiazide (MICROZIDE) 12.5 MG capsule Take 1 capsule (12.5 mg) by mouth every morning 90 capsule 3    labetalol (NORMODYNE) 100 MG tablet Take 3 tablets (300 mg) by mouth 2 times daily 540 tablet 3    losartan (COZAAR) 50 MG tablet Take 1 tablet (50 mg) by mouth daily 90 tablet 3    MULTIVITAMIN TABS   OR 1 TABLET DAILY       No Known Allergies  Recent Labs   Lab Test 03/31/23  1206 02/01/22  1147 12/22/20  0713 06/08/19  0959   LDL 77 56 63  --    HDL 63 61 73  --    TRIG 96 153* 115  --    CR 0.76 0.70 0.74 0.73   GFRESTIMATED 86 >90 86 88   GFRESTBLACK  --   --  >90 >90   POTASSIUM 3.5 3.6 3.5 3.6      Current providers sharing in care for this patient include:  Patient Care Team:  Judy Jiménez DNP as PCP - General (Nurse Practitioner - Family)  Judy Jiménez DNP as Assigned PCP    The following health maintenance items are reviewed in Epic and correct as of today:  Health Maintenance   Topic Date Due    HEPATITIS C SCREENING  Never done    RSV VACCINE (Pregnancy & 60+) (1 - 1-dose 60+ series) Never done    COVID-19 Vaccine (5 - 2023-24 season)  "09/01/2023    LIPID  03/31/2024    ANNUAL REVIEW OF HM ORDERS  03/31/2024    MEDICARE ANNUAL WELLNESS VISIT  03/31/2024    DTAP/TDAP/TD IMMUNIZATION (3 - Td or Tdap) 12/10/2024    FALL RISK ASSESSMENT  04/25/2025    MAMMO SCREENING  12/04/2025    GLUCOSE  03/31/2026    COLORECTAL CANCER SCREENING  10/12/2027    ADVANCE CARE PLANNING  03/31/2028    DEXA  04/27/2038    PHQ-2 (once per calendar year)  Completed    INFLUENZA VACCINE  Completed    Pneumococcal Vaccine: 65+ Years  Completed    ZOSTER IMMUNIZATION  Completed    IPV IMMUNIZATION  Aged Out    HPV IMMUNIZATION  Aged Out    MENINGITIS IMMUNIZATION  Aged Out    RSV MONOCLONAL ANTIBODY  Aged Out    PAP  Discontinued         Review of Systems  Constitutional, HEENT, cardiovascular, pulmonary, GI, , musculoskeletal, neuro, skin, endocrine and psych systems are negative, except as otherwise noted.     Objective    Exam  BP (!) 138/92 (BP Location: Right arm, Patient Position: Sitting, Cuff Size: Adult Large)   Pulse 59   Temp 98.4  F (36.9  C) (Tympanic)   Resp 20   Ht 1.632 m (5' 4.25\")   LMP 04/01/2008   SpO2 96%   BMI 30.66 kg/m     Estimated body mass index is 30.66 kg/m  as calculated from the following:    Height as of this encounter: 1.632 m (5' 4.25\").    Weight as of 10/12/22: 81.6 kg (180 lb).    Physical Exam  GENERAL: alert and no distress  EYES: Eyes grossly normal to inspection, PERRL and conjunctivae and sclerae normal  HENT: ear canals and TM's normal, nose and mouth without ulcers or lesions  NECK: no adenopathy, no asymmetry, masses, or scars  RESP: lungs clear to auscultation - no rales, rhonchi or wheezes  CV: regular rate and rhythm, normal S1 S2, no S3 or S4, no murmur, click or rub, no peripheral edema  ABDOMEN: soft, nontender, no hepatosplenomegaly, no masses and bowel sounds normal  MS: no gross musculoskeletal defects noted, no edema. Left knee slightly swollen, no erythematous.   SKIN: no suspicious lesions or rashes  NEURO: " Normal strength and tone, mentation intact and speech normal  PSYCH: mentation appears normal, affect normal/bright         4/25/2024   Mini Cog   Clock Draw Score 2 Normal   3 Item Recall 3 objects recalled   Mini Cog Total Score 5             Signed Electronically by: Judy Jiménez, DNP

## 2024-04-25 NOTE — PATIENT INSTRUCTIONS
[facility]   [address]   [address]   [place] [address] [phone]     Call to have MRI ordered and follow up with Neurosurgery.    Judy Jiménez DNP       Preventive Care Advice   This is general advice given by our system to help you stay healthy. However, your care team may have specific advice just for you. Please talk to your care team about your preventive care needs.  Nutrition  Eat 5 or more servings of fruits and vegetables each day.  Try wheat bread, brown rice and whole grain pasta (instead of white bread, rice, and pasta).  Get enough calcium and vitamin D. Check the label on foods and aim for 100% of the RDA (recommended daily allowance).  Lifestyle  Exercise at least 150 minutes each week   (30 minutes a day, 5 days a week).  Do muscle strengthening activities 2 days a week. These help control your weight and prevent disease.  No smoking.  Wear sunscreen to prevent skin cancer.  Have a dental exam and cleaning every 6 months.  Yearly exams  See your health care team every year to talk about:  Any changes in your health.  Any medicines your care team has prescribed.  Preventive care, family planning, and ways to prevent chronic diseases.  Shots (vaccines)   HPV shots (up to age 26), if you've never had them before.  Hepatitis B shots (up to age 59), if you've never had them before.  COVID-19 shot: Get this shot when it's due.  Flu shot: Get a flu shot every year.  Tetanus shot: Get a tetanus shot every 10 years.  Pneumococcal, hepatitis A, and RSV shots: Ask your care team if you need these based on your risk.  Shingles shot (for age 50 and up).  General health tests  Diabetes screening:  Starting at age 35, Get screened for diabetes at least every 3 years.  If you are younger than age 35, ask your care team if you should be screened for diabetes.  Cholesterol test: At age 39, start having a cholesterol test every 5 years, or more often if  advised.  Bone density scan (DEXA): At age 50, ask your care team if you should have this scan for osteoporosis (brittle bones).  Hepatitis C: Get tested at least once in your life.  STIs (sexually transmitted infections)  Before age 24: Ask your care team if you should be screened for STIs.  After age 24: Get screened for STIs if you're at risk. You are at risk for STIs (including HIV) if:  You are sexually active with more than one person.  You don't use condoms every time.  You or a partner was diagnosed with a sexually transmitted infection.  If you are at risk for HIV, ask about PrEP medicine to prevent HIV.  Get tested for HIV at least once in your life, whether you are at risk for HIV or not.  Cancer screening tests  Cervical cancer screening: If you have a cervix, begin getting regular cervical cancer screening tests at age 21. Most people who have regular screenings with normal results can stop after age 65. Talk about this with your provider.  Breast cancer scan (mammogram): If you've ever had breasts, begin having regular mammograms starting at age 40. This is a scan to check for breast cancer.  Colon cancer screening: It is important to start screening for colon cancer at age 45.  Have a colonoscopy test every 10 years (or more often if you're at risk) Or, ask your provider about stool tests like a FIT test every year or Cologuard test every 3 years.  To learn more about your testing options, visit: https://www.[employer]/001622.pdf.  For help making a decision, visit: https://bit.ly/tw64006.  Prostate cancer screening test: If you have a prostate and are age 55 to 69, ask your provider if you would benefit from a yearly prostate cancer screening test.  Lung cancer screening: If you are a current or former smoker age 50 to 80, ask your care team if ongoing lung cancer screenings are right for you.  For informational purposes only. Not to replace the advice of your health care provider. Copyright   2023  [facility]. All rights reserved. Clinically reviewed by the [facility] Transitions Program. [employer] 539907 - REV 01/24.

## 2024-05-11 DIAGNOSIS — I10 ESSENTIAL HYPERTENSION WITH GOAL BLOOD PRESSURE LESS THAN 140/90: ICD-10-CM

## 2024-05-13 RX ORDER — LABETALOL 100 MG/1
300 TABLET, FILM COATED ORAL 2 TIMES DAILY
Qty: 540 TABLET | Refills: 3 | OUTPATIENT
Start: 2024-05-13

## 2024-05-16 ENCOUNTER — HOSPITAL ENCOUNTER (OUTPATIENT)
Dept: MRI IMAGING | Facility: CLINIC | Age: 69
Discharge: HOME | End: 2024-05-16
Admitting: NURSE PRACTITIONER
Payer: COMMERCIAL

## 2024-05-16 ENCOUNTER — OFFICE VISIT (OUTPATIENT)
Dept: PALLIATIVE MEDICINE | Facility: CLINIC | Age: 69
End: 2024-05-16
Attending: NURSE PRACTITIONER
Payer: COMMERCIAL

## 2024-05-16 VITALS — SYSTOLIC BLOOD PRESSURE: 145 MMHG | HEART RATE: 55 BPM | DIASTOLIC BLOOD PRESSURE: 76 MMHG

## 2024-05-16 DIAGNOSIS — M54.41 CHRONIC BILATERAL LOW BACK PAIN WITH RIGHT-SIDED SCIATICA: ICD-10-CM

## 2024-05-16 DIAGNOSIS — R20.2 PARESTHESIA OF RIGHT LOWER EXTREMITY: ICD-10-CM

## 2024-05-16 DIAGNOSIS — M51.369 DDD (DEGENERATIVE DISC DISEASE), LUMBAR: ICD-10-CM

## 2024-05-16 DIAGNOSIS — I67.1 BERRY ANEURYSM: ICD-10-CM

## 2024-05-16 DIAGNOSIS — M54.16 LUMBAR RADICULOPATHY: Primary | ICD-10-CM

## 2024-05-16 DIAGNOSIS — G89.29 CHRONIC BILATERAL LOW BACK PAIN WITH RIGHT-SIDED SCIATICA: ICD-10-CM

## 2024-05-16 PROCEDURE — G2211 COMPLEX E/M VISIT ADD ON: HCPCS | Performed by: NURSE PRACTITIONER

## 2024-05-16 PROCEDURE — 70544 MR ANGIOGRAPHY HEAD W/O DYE: CPT

## 2024-05-16 PROCEDURE — 99204 OFFICE O/P NEW MOD 45 MIN: CPT | Performed by: NURSE PRACTITIONER

## 2024-05-16 ASSESSMENT — PAIN SCALES - PAIN ENJOYMENT GENERAL ACTIVITY SCALE (PEG)
INTERFERED_ENJOYMENT_LIFE: 8
AVG_PAIN_PASTWEEK: 6
AVG_PAIN_PASTWEEK: 6
INTERFERED_ENJOYMENT_LIFE: 8
PEG_TOTALSCORE: INCOMPLETE

## 2024-05-16 ASSESSMENT — PAIN SCALES - GENERAL: PAINLEVEL: SEVERE PAIN (6)

## 2024-05-16 NOTE — PATIENT INSTRUCTIONS
PATIENT INSTRUCTIONS:     I am recommending a multidisciplinary treatment plan to help this patient better manage her pain. The following recommendations were given to the patient. Diagnosis, treatment options, risks, benefits, and alternatives were discussed; all questions were answered. Self-care instructions were given. The patient expressed understanding of the plan for management.   Continue Current Treatment Plan with:   - Existing medications, as prescribed by Primary Care Provider     New Referrals:   - Physical Therapy:    Referral placed today for evaluation and treatment for strengthening and conditioning of low back pain with right sided sciatica.     Return to Clinic:   -  30-minute In-Person Appointment with Casie Weber, KEVIN, YARA, SHERRYC in 4-6 weeks, or sooner if needed    Future Considerations:  - Lumbar MRI if no improvement with physical therapy  - If you feel more pain relief from ibuprofen, we may consider using a long-acting anti inflammatory such as Meloxicam.   ----------------------------------------------------------------  Clinic Number:  [phone]   Call with any questions about your care and for scheduling assistance.   Calls are returned Monday through Friday between 8 AM and 4:30 PM. We usually get back to you within 2 business days depending on the issue/request.    If we are prescribing your medications:  For opioid medication refills, call the clinic or send a Globant message 7 days in advance.  Please include:  Name of requested medication  Name of the pharmacy.  For non-opioid medications, call your pharmacy directly to request a refill. Please allow 3-4 days to be processed.   Per [place] State Law:  All controlled substance prescriptions must be filled within 30 days of being written.    For those controlled substances allowing refills, pickup must occur within 30 days of last fill.      We believe regular attendance is key to your success in our program!    Any time you are  unable to keep your appointment we ask that you call us at least 24 hours in advance to cancel.This will allow us to offer the appointment time to another patient.   Multiple missed appointments may lead to dismissal from the clinic.

## 2024-05-16 NOTE — PROGRESS NOTES
"[facility] Medical Spine Evaluation      Date of Visit: 5/16/2024    Patient seen at the request of Judy Jiménez DNP (Family Medicine) for an opinion and evaluation of bilateral low back pain and right-sided sciatica.      Subjective    HISTORY OF PRESENT ILLNESS:  Aretha Hooks is a 68 year old female who presents with a chief complaint of lumbar radiculopathy and right sided sciatica.     Functional limitations: walking greater than 10 minutes; standing greater than 10 minutes     Oswestry (LAI) Questionnaire        5/16/2024     2:10 PM   OSWESTRY DISABILITY INDEX   Count 9   Sum 13   Oswestry Score (%) 28.89 %         Back Pain  Onset: Pain began several years ago and not associated with trauma. \"I bent over to pick something up and could not stand up again.\" Pain has been getting progressively worse. Pain limits distance in walking; unable to perform gardening and hobbies r/t increased pain.      Description:   Location of pain: low back right   Radiation: to calf   Character of pain: burning, tingling, numb    Pain free between episodes: No, but much better with sitting     Any injury? ( trauma, lifting, bending, twisting?): No   Work Injury (Work Comp?): No  Intensity: Best pain level 3/10; worst pain 8/10; average pain 6/10      History:  Able to sleep?: Yes, wakes up with pain     Able to work?: N/A  - retired   History of back problems before: recurrent self limited episodes of low back pain in the past and previous herniated disc      Pain-free between episodes: No   Any previous evaluations for back pain: Spine Specialist, MRI, and X-ray   Any previous spine MRI or X-rays: Yes   Any prior procedures: Yes - LESI several years ago ([facility])   Any related surgery: No    Any cancer history: No   Accompanying Signs & Symptoms:     Fever: No     Numbness or tingling in arms, legs, feet: Yes, right leg to knee        Weakness in arms, legs, feet: No     Dysuria: No      Blood in urine: No "     Urinary incontinence: No     Bowel incontinence: No     Weight loss: No   Precipitating and/or Alleviating factors:    Does rest help: Yes   Certain positions make it better or worse: Yes, sitting helps  ; walking/standing makes worse   Does bending over, or twisting make it worse: Yes, worst with twisting     Progression of Symptoms since onset: initially better now getting worse   Therapies tried and outcome: rest, stretching, NSAIDS, Muscle relaxants, and BioFreeze with minor relief          Current Pain Medications:  - Gabapentin 300mg TID (H)  - Acetaminophen 500mg - 2 tabs (1000mg) 3x/day (H for OA of knees)   - Ibuprofen 200mg - 2 tabs (400mg) 2x/day (H for OA of knees)     Prior/Trialed Pain Medications:   - Meloxicam (NH)   - Cyclobenzaprine (NH)       IMAGING - reviewed     MRI Lumbar Spine 1/06/21  T11-T12: Normal disc height and signal. No herniation. Normal facets.  Small perineural cysts in the bilateral neural foramina. No spinal  canal or neural foraminal stenosis. No change.     T12-L1: Normal disc height and signal. No herniation. Normal facets.  Small perineural cysts in the bilateral neural foramina. No spinal  canal or neural foraminal stenosis. No change.     L1-L2: Mild disc desiccation without significant disc height loss. No  herniation. Normal facets. No spinal canal or neural foraminal  stenosis. No change.     L2-L3: Disc desiccation with mild disc height loss. Small symmetric  disc bulge. Normal facets. No significant spinal canal or neural  foraminal stenosis. No significant change.     L3-L4: Disc desiccation with mild disc height loss. There is a small  disc bulge slightly eccentric to the left with moderate to severe left  and moderate right facet arthropathy. Small presumed synovial cyst  arising posteriorly from the right facet joint measuring 4-5 mm  without mass effect on the neural structures. Mild left lateral recess  stenosis without significant mass effect upon or  displacement of the  traversing left L4 nerve root. No central spinal canal stenosis. Mild  to moderate left neural foraminal stenosis. The right neural foramen  is patent. Overall, no significant change.     L4-L5: Moderate to severe disc height loss, predominantly involving  the right aspect of the disc space. There is a disc bulge eccentric to  the right with posterior endplate osteophytic ridging. Moderate to  severe right and moderate left facet arthropathy. Mild bilateral  lateral recess encroachment without significant mass effect upon or  displacement of the traversing L5 nerve roots. No central spinal canal  stenosis. Mild to moderate bilateral neural foraminal stenosis.  Overall, no significant change.     L5-S1: Normal disc height and signal. No herniation. Moderate to  severe bilateral facet arthropathy. Tiny synovial cyst projecting  posteriorly from the right facet joint measuring 5-6 mm, without mass  effect on the neural structures. No spinal canal stenosis. No  significant neural foraminal stenosis. Overall, no change.                                                                      IMPRESSION:  1. Overall, no significant change in multilevel degenerative disc  disease and facet arthropathy of the lumbar spine compared to MRI of  10/7/2019.  2. Mild multilevel lateral recess stenosis, seen on the left at L3-L4  and bilaterally at L4-L5. No significant central spinal canal  stenosis.  3. Mild to moderate bilateral neural foraminal stenosis at L4-L5 and  mild-to-moderate left neural foraminal stenosis at L3-L4.      REVIEW OF SYSTEMS:   I am responding to those symptoms which are directly relevant to the specific indication for my consultation. I recommend that the patient follow up with their primary or referring provider to pursue any other symptoms which may be of concern.       Medical History:  She  has a past medical history of Arthritis (10 years), ASCUS of cervix with negative high risk  HPV (03/2011), Carcinoid tumor  of colon, Other vitamin B12 deficiency anemia, and Unspecified essential hypertension (1990).     She  has a past surgical history that includes surgical history of -  (01/01/2004); surgical history of -  (01/01/2004); APPENDECTOMY (Age 12); LIGATE FALLOPIAN TUBE (01/01/1984); surgical history of -  (01/01/1988); surgical history of -  (01/01/2004); surgical history of -  (01/01/2008); KNEE SCOPE,MED+LAT MENIS REPAIR (01/01/2009); Colonoscopy (N/A, 11/10/2017); Colonoscopy (N/A, 10/12/2022); and Cholecystectomy (15 years).    Family History  Her family history includes C.A.D. in her paternal grandmother; Cancer in her mother and sister; Eye Disorder in her father; Genitourinary Problems in her sister; Gynecology in her sister; Hypertension in her brother and father; Musculoskeletal Disorder in her sister; Respiratory in her father; Skin Cancer in her sister and sister.     Social History:  Work: retired  History of any legal related pain issues: no  Current living situation: single family home with    She  reports that she has never smoked. She has been exposed to tobacco smoke. She has never used smokeless tobacco. She reports current alcohol use. She reports that she does not use drugs.        Current Medications:   She has a current medication list which includes the following prescription(s): caltrate 600 + d, cyanocobalamin, fish oil, gabapentin, hydrochlorothiazide, labetalol, losartan, and multivitamin.     Allergies:   No Known Allergies      Objective   OBJECTIVE    Vitals:    05/16/24 1421   BP: (!) 145/76   Pulse: 55         Appearance:   A&O. Patient is appropriate. Patient is in NAD.      HHENT:  Normocephalic, atraumatic. Eyes without conjunctival injection or jaundice. Neck supple.     Pulmonary:  Normal effort; no cough or audible wheeze.       Skin: No obvious rash, lesions, or petechiae of exposed skin.    Psych:  Alert, without lethargy or stupor. Speech  fluent. Appropriate affect. Mood normal. Able to follow commands without difficulty. Normal mood, judgement and behavior     Spine Musculoskeletal Exam     Gait pattern:  Patient has an antalgic gait pattern:NO  Gait favors the neither side.  Mobility and/or assistive devices? NO      Able to walk on the heels and toes with mild difficulty.    Normal bulk and tone.   Unremarkable spinal curvature.     Strength:   Knee ext: R: 5/5  L: 5/5  Knee flex: R: 5/5  L: 5/5  Dorsiflexion: R: 5/5  L: 5/5  Plantarflexion: R: 5/5  L: 5/5  Great toe ext:  R: 5/5  L: 5/5       Neurological:   Deep Tendon Reflex exam:   Patella:  R:  3/4   L: 2/4      Sensory exam:  (lower extremities)   Light touch: normal    Vibration: abnormal distal LE right > left    Pin prick: abnormal proximal RLE    Allodynia: absent    Dysethesia: present proximal RLE   Hyperalgesia: absent     Cervical spine:  Tenderness in the cervical spine at midline. No  Tenderness in the cervical paraspinal muscles. No    Thoracic spine:   Kyphosis. Yes age related   Tenderness in the thoracic spine at midline. No  Tenderness in the thoracic paraspinal muscles. No    Lumbar/Sacral spine:  Range of motion by visual estimation   Forward Flexion:  70 degrees, painful   Ext: 15 degrees, painful   Rotation/ext to right: painful   Rotation/ext to left: pain free  Lordosis. Yes  Tenderness in the lumbar spine at midline. Yes  Tenderness in the lumbar paraspinal muscles.Yes  Straight leg exam:  Right: positive    Left:  negative  Neural Slump test:  Right: positive    Left:  positive  Huey/Marino's test:     Right: positive    Left:  negative  Passive internal rotation:   Right: positive    Left: negative   Active external rotation:    Right: positive    Left: negative  Tenderness over SI joint:     Right: positive    Left:  positive  Tenderness over Trochanteric Bursa:    Right: negative    Left: negative    Facet Loading: Positive         Assessment & Plan    ASSESSMENT:     Aretha Hooks is a pleasant 68 year old female who presents with lumbar radiculopathy with right-sided sciatica.  Bilateral lower extremity strength is equal however right sided sensation is diminished by pinprick and vibration.  Pain, numbness and tingling are progressively advancing down the right leg from hip to knee over the past few years.  Recommending physical therapy for conservative measures for 4 to 6 weeks.  If no improvement, then consider lumbar MRI to assess for potential benefit of interventional pain injections.  Patient would like to be off of all medications including gabapentin.  She states gabapentin makes her excessively tired if taking a consistent 3 doses per day.  We discussed the potential benefit of rotation to pregabalin.  We also discussed the benefits of long-acting anti-inflammatories.  At this time she will continue to rotate Tylenol and ibuprofen.  Continue with gabapentin until next exam.  Return in 6 weeks to reassess symptoms after physical therapy.  Patient verbalizes understanding and agreement with plan.    PLAN:    Continue Current Treatment Plan with:   - Existing medications, as prescribed by Primary Care Provider        New Referrals:   - Physical Therapy:    Referral placed today for evaluation and treatment for strengthening and conditioning of low back pain with right sided sciatica.       Return to Clinic:   -  30-minute In-Person Appointment with Casie Weber, KEVIN, APRN, FNP-C in 4-6 weeks, or sooner if needed      Future Considerations:  - Lumbar MRI if no improvement with physical therapy  - If you feel more pain relief from ibuprofen, we may consider using a long-acting anti inflammatory such as Meloxicam.       VISIT DIAGNOSIS:   1. Lumbar radiculopathy  - Physical Therapy  Referral; Future  - Adult Pain Clinic Follow-Up Order; Future    2. Paresthesia of right lower extremity  - Adult Pain Clinic Follow-Up Order; Future    3. Chronic bilateral  low back pain with right-sided sciatica  - Spine  Referral  - Physical Therapy  Referral; Future  - Adult Pain Clinic Follow-Up Order; Future    4. DDD (degenerative disc disease), lumbar  - Spine  Referral  - Physical Therapy  Referral; Future  - Adult Pain Clinic Follow-Up Order; Future      ___________________________________________________________________    BILLING TIME DOCUMENTATION:   TOTAL TIME includes:   Time spent preparing to see the patient: 5 minutes (reviewing records and tests)  Time spend face to face with the patient: 46 minutes  Time spent ordering tests, medications, procedures and referrals: 0 minutes  Time spent Referring and communicating with other healthcare professionals: 0 minutes  Documenting clinical information in Epic: 3 minutes    The total TIME spent on this patient on the day of the appointment was 54 minutes.   ___________________________________________________________________    Review of Electronic Chart, Relevant Records/History: Today I have also reviewed available medical information in the patient's medical record at [facility] and Care Everywhere (if available), including relevant provider notes, laboratory work, and imaging. Please see the [facility] [place] health questionnaire which the patient completed and reviewed with me in detail.     Casie Weber, APRN, DNP, FNP-C   [facility]

## 2024-05-29 ENCOUNTER — THERAPY VISIT (OUTPATIENT)
Dept: PHYSICAL THERAPY | Facility: CLINIC | Age: 69
End: 2024-05-29
Attending: NURSE PRACTITIONER
Payer: COMMERCIAL

## 2024-05-29 DIAGNOSIS — G89.29 CHRONIC BILATERAL LOW BACK PAIN WITH RIGHT-SIDED SCIATICA: ICD-10-CM

## 2024-05-29 DIAGNOSIS — M54.16 LUMBAR RADICULOPATHY: ICD-10-CM

## 2024-05-29 DIAGNOSIS — M51.369 DDD (DEGENERATIVE DISC DISEASE), LUMBAR: ICD-10-CM

## 2024-05-29 DIAGNOSIS — M54.41 CHRONIC BILATERAL LOW BACK PAIN WITH RIGHT-SIDED SCIATICA: ICD-10-CM

## 2024-05-29 PROCEDURE — 97161 PT EVAL LOW COMPLEX 20 MIN: CPT | Mod: GP | Performed by: PHYSICAL THERAPIST

## 2024-05-29 PROCEDURE — 97110 THERAPEUTIC EXERCISES: CPT | Mod: GP | Performed by: PHYSICAL THERAPIST

## 2024-05-29 NOTE — PROGRESS NOTES
"PHYSICAL THERAPY EVALUATION  Type of Visit: Evaluation    See electronic medical record for Abuse and Falls Screening details.    Subjective       R lower back at waistline level, buttock to lateral hip and lateral thigh above the knee only. Burning sensation. Numbness in the lateral thigh. No left sided complaints. Prior to onset of symptoms would walk roughly 60 minutes. Limited to <20 minutes now.   Pain began several years ago and has progressively gotten worse. [place] \"I bent over to pick something up and could not stand up again.\" Came to the ER. Had an MRI and was on gabapentin and meloxicam at the time which helped but didn't resolve the issue. Doesn't like the gabapentin, too sleepy and fatigued but takes because it significantly helps. Pain limits distance in walking; unable to perform gardening and hobbies r/t increased pain. Feels that she's becoming more and more limited wich is why she's seeking care for it now. No hx of injury, fall. Denies bowel/bladder changes, unusual weakness.   LES injection - helped, reduced pain.    Lumbar MRI 1/6/21  IMPRESSION:  1. Overall, no significant change in multilevel degenerative disc  disease and facet arthropathy of the lumbar spine compared to MRI of  10/7/2019.  2. Mild multilevel lateral recess stenosis, seen on the left at L3-L4  and bilaterally at L4-L5. No significant central spinal canal  stenosis.  3. Mild to moderate bilateral neural foraminal stenosis at L4-L5 and  mild-to-moderate left neural foraminal stenosis at L3-L4.  Presenting condition or subjective complaint: Lower back pain  Date of onset: 05/16/24    Relevant medical history: Arthritis; High blood pressure; Menopause; Pain at night or rest; Sleep disorder like apnea   Dates & types of surgery:   N/A    Prior diagnostic imaging/testing results: MRI; CT scan     Prior therapy history for the same diagnosis, illness or injury: No      Employment: Not Applicable  "   Hobbies/Interests:  gardening, walking, working at the [employer]    Patient goals for therapy: Take longer walks, bending over, waking up at night with back pain    PAIN ASSESSMENT:   P1:  Location: right tailbone, lateral hip and lateral thigh   Description (ache, burn, throb, stab, shoot, etc): needles, burning, numbness. Constant.  Pain Rating (Current, Best, Worst): 3/10 - 8/10  Aggs: walking (<10 min), standing (<10 min), bending over to garden, sitting and turning  Eases: sitting, gabapentin, slowing down her walking pace    P2:  Location: left meniscus w/ hx of arthroscopic debridement  Description (ache, burn, throb, stab, shoot, etc): moments of catching, hx of arthroscopic clean out, but still positions it's tender Aggs: bending into a squat, kneeling, crossing ankle to opposite knee, pivoting    LUMBAR SPINE EVALUATION  OBSERVATION: general kyphotic posturing  GAIT:  no significant findings, no worsening sx  DERMATOMES:  absent sensation lateral thigh right  MYOTOMES: WNL  DTR S: WNL, equal bilaterally  CORD SIGNS: WNL    LUMBAR ROM:   limited extension with local LBP  limited and painful right sidebending with radiating pain to right upper buttock/lateral trunk  Flexion WNL, painful to come back to standing posture  STRENGTH:  not formally tested    NEURAL PROVOCATION:    Slump (Seated, Cervical, Standing): negative    SLR (Crossed): negative    JOINT MOBILITY/PAIN PROVOCATION (CPA/UPA):    Thoracic Spine: hypomobile throughout, no reproduction of primary complaint    Lumbar Spine: hypomobile R>L L5-S1, L4-5 with significant reproduction 7/10 of primary local back pain, no radiating or worsening distal symptoms (hip, lateral thigh). Upper lumbar hypomobile with local tenderness    SIJ: WNL    Hip: WNL    FLEXIBILITY:  hamstring limited, tighter with active hamstring mobility + ankle pump     PALPATION:  TTP lumbar paraspinals, right glute, piriformis    Hooklying lumbar rotation, SKTC, hamstring  mobility/nerve glide    Assessment & Plan       CLINICAL IMPRESSIONS  Medical Diagnosis: rimary  Chronic bilateral low back pain with right-sided sciatica    Treatment Diagnosis: R L5 Radiculopathy   Impression/Assessment: Pt is a 68 female presenting with signs and symptoms consistent with right L5 radiculopathy with associated impairments including loss of sensation in proximal L5 dermatome, hypomobile L1-S1 facets with significant reproduction of local back pain at R L4-5, L5-S1 facet, limited extension and right lumbar sidebending with radiating symptoms contributing to difficulty with extension-based activities like walking, standing, and recreational activities including gardening. Pt would benefit from manual therapy to address joint and soft tissue dysfunction, and progressive thoracic, lumbar and hip mobility program with guided progression back to walking >45 minutes to address pain and impairments. Skilled PT is indicated to address impairments, achieve patient goals and restore patient to baseline function.     Clinical Decision Making (Complexity):  Clinical Presentation: Stable/Uncomplicated  Clinical Presentation Rationale: based on medical and personal factors listed in PT evaluation  Clinical Decision Making (Complexity): Low complexity    PLAN OF CARE  Treatment Interventions:  Modalities: Cryotherapy, E-stim, Hot Pack, Mechanical Traction, Ultrasound  Interventions: Gait Training, Manual Therapy, Neuromuscular Re-education, Therapeutic Activity, Therapeutic Exercise, Self-Care/Home Management    Long Term Goals     PT Goal 1  Goal Identifier: 1  Goal Description: Pt will demonstrate full lumbar extension with <2/10 back pain  Rationale: to maximize safety and independence with performance of ADLs and functional tasks  Target Date: 06/26/24  PT Goal 2  Goal Identifier: 2  Goal Description: Pt will be able to walk >30 minutes with <3/10 pain  Rationale: to maximize safety and independence with  performance of ADLs and functional tasks  Target Date: 06/26/24  PT Goal 3  Goal Identifier: 3  Goal Description: Pt will be able to walk >45 minutes with <3/10 pain  Target Date: 07/24/24  PT Goal 4  Goal Identifier: 4  Goal Description: Pt will be able to tolerate 20 minutes of gardening activities with <3/10 pain  Rationale: to maximize safety and independence with performance of ADLs and functional tasks  Target Date: 07/24/24      Frequency of Treatment: 1-2x/week  Duration of Treatment: 8 weeks    Recommended Referrals to Other Professionals:  none  Education Assessment:   Learner/Method: Patient    Risks and benefits of evaluation/treatment have been explained.   Patient/Family/caregiver agrees with Plan of Care.     Evaluation Time:     PT Eval, Low Complexity Minutes (38048): 38     Signing Clinician: Nell Moffett PT        [facility]                                                                                   OUTPATIENT PHYSICAL THERAPY      PLAN OF TREATMENT FOR OUTPATIENT REHABILITATION   Patient's Last Name, First Name, Aretha Redding YOB: 1955   Provider's Name   [facility]   Medical Record No.  [id number]     Onset Date: 05/16/24  Start of Care Date: 05/29/24     Medical Diagnosis:  rimary  Chronic bilateral low back pain with right-sided sciatica      PT Treatment Diagnosis:  R L5 Radiculopathy Plan of Treatment  Frequency/Duration: 1-2x/week/ 8 weeks    Certification date from 05/29/24 to 07/24/24         See note for plan of treatment details and functional goals     Nell Moffett PT                         Referring Provider:  Casie Weber    Initial Assessment  See Epic Evaluation- Start of Care Date: 05/29/24

## 2024-06-05 ENCOUNTER — THERAPY VISIT (OUTPATIENT)
Dept: PHYSICAL THERAPY | Facility: CLINIC | Age: 69
End: 2024-06-05
Attending: NURSE PRACTITIONER
Payer: COMMERCIAL

## 2024-06-05 DIAGNOSIS — M54.16 LUMBAR RADICULOPATHY: Primary | ICD-10-CM

## 2024-06-05 DIAGNOSIS — M54.41 CHRONIC BILATERAL LOW BACK PAIN WITH RIGHT-SIDED SCIATICA: ICD-10-CM

## 2024-06-05 DIAGNOSIS — G89.29 CHRONIC BILATERAL LOW BACK PAIN WITH RIGHT-SIDED SCIATICA: ICD-10-CM

## 2024-06-05 PROCEDURE — 97110 THERAPEUTIC EXERCISES: CPT | Mod: GP | Performed by: PHYSICAL THERAPIST

## 2024-06-12 ENCOUNTER — THERAPY VISIT (OUTPATIENT)
Dept: PHYSICAL THERAPY | Facility: CLINIC | Age: 69
End: 2024-06-12
Attending: NURSE PRACTITIONER
Payer: COMMERCIAL

## 2024-06-12 DIAGNOSIS — M54.16 LUMBAR RADICULOPATHY: Primary | ICD-10-CM

## 2024-06-12 DIAGNOSIS — G89.29 CHRONIC BILATERAL LOW BACK PAIN WITH RIGHT-SIDED SCIATICA: ICD-10-CM

## 2024-06-12 DIAGNOSIS — M54.41 CHRONIC BILATERAL LOW BACK PAIN WITH RIGHT-SIDED SCIATICA: ICD-10-CM

## 2024-06-12 PROCEDURE — 97110 THERAPEUTIC EXERCISES: CPT | Mod: GP | Performed by: PHYSICAL THERAPIST

## 2024-06-19 ENCOUNTER — THERAPY VISIT (OUTPATIENT)
Dept: PHYSICAL THERAPY | Facility: CLINIC | Age: 69
End: 2024-06-19
Attending: NURSE PRACTITIONER
Payer: COMMERCIAL

## 2024-06-19 DIAGNOSIS — M54.16 LUMBAR RADICULOPATHY: Primary | ICD-10-CM

## 2024-06-19 DIAGNOSIS — M54.41 CHRONIC BILATERAL LOW BACK PAIN WITH RIGHT-SIDED SCIATICA: ICD-10-CM

## 2024-06-19 DIAGNOSIS — G89.29 CHRONIC BILATERAL LOW BACK PAIN WITH RIGHT-SIDED SCIATICA: ICD-10-CM

## 2024-06-19 PROCEDURE — 97110 THERAPEUTIC EXERCISES: CPT | Mod: GP | Performed by: PHYSICAL THERAPIST

## 2024-06-25 ENCOUNTER — OFFICE VISIT (OUTPATIENT)
Dept: PALLIATIVE MEDICINE | Facility: CLINIC | Age: 69
End: 2024-06-25
Payer: COMMERCIAL

## 2024-06-25 VITALS — SYSTOLIC BLOOD PRESSURE: 126 MMHG | DIASTOLIC BLOOD PRESSURE: 75 MMHG | HEART RATE: 56 BPM

## 2024-06-25 DIAGNOSIS — R20.2 PARESTHESIA OF RIGHT LOWER EXTREMITY: ICD-10-CM

## 2024-06-25 DIAGNOSIS — M54.16 LUMBAR RADICULOPATHY: Primary | ICD-10-CM

## 2024-06-25 PROCEDURE — 99214 OFFICE O/P EST MOD 30 MIN: CPT | Performed by: NURSE PRACTITIONER

## 2024-06-25 PROCEDURE — G2211 COMPLEX E/M VISIT ADD ON: HCPCS | Performed by: NURSE PRACTITIONER

## 2024-06-25 ASSESSMENT — PAIN SCALES - GENERAL: PAINLEVEL: MILD PAIN (3)

## 2024-06-25 NOTE — PATIENT INSTRUCTIONS
PATIENT INSTRUCTIONS:     I am recommending a multidisciplinary treatment plan to help this patient better manage her pain. The following recommendations were given to the patient. Diagnosis, treatment options, risks, benefits, and alternatives were discussed; all questions were answered. Self-care instructions were given. The patient expressed understanding of the plan for management.   Medication Management:   - CONTINUE taking medications as currently prescribed     Continue Current Treatment Plan with:   - Physical Therapy    New Orders:   - Diagnostic Imaging:  Lumbar MRI  at [facility].  Please call [phone] to schedule.    Return to Clinic:   -  30-minute In-Person Appointment with Casie Weber DNP, YARA, AKASH in 3-4 weeks, or sooner if needed    ----------------------------------------------------------------  Clinic Number:  [phone]   Call with any questions about your care and for scheduling assistance.   Calls are returned Monday through Friday between 8 AM and 4:30 PM. We usually get back to you within 2 business days depending on the issue/request.    If we are prescribing your medications:  For opioid medication refills, call the clinic or send a [employer] message 7 days in advance.  Please include:  Name of requested medication  Name of the pharmacy.  For non-opioid medications, call your pharmacy directly to request a refill. Please allow 3-4 days to be processed.   Per [place] State Law:  All controlled substance prescriptions must be filled within 30 days of being written.    For those controlled substances allowing refills, pickup must occur within 30 days of last fill.      We believe regular attendance is key to your success in our program!    Any time you are unable to keep your appointment we ask that you call us at least 24 hours in advance to cancel.This will allow us to offer the appointment time to another patient.   Multiple missed appointments may lead to dismissal from  the clinic.

## 2024-06-25 NOTE — PROGRESS NOTES
[facility] Spine Follow-Up Encounter        Date of Visit: 6/25/2024    CHIEF COMPLAINT:   Chief Complaint   Patient presents with    Pain       Subjective   SUBJECTIVE    INTERVAL HISTORY:   Aretha Hooks is a 68 year old female seen for INITIAL EVALUATION on 5/16/24.  They are returning today for bilateral low back pain and right-sided sciatica.         Recommendations/ Plan at the last visit included:  Aretha Hooks is a pleasant 68 year old female who presents with lumbar radiculopathy with right-sided sciatica.  Bilateral lower extremity strength is equal however right sided sensation is diminished by pinprick and vibration.  Pain, numbness and tingling are progressively advancing down the right leg from hip to knee over the past few years.  Recommending physical therapy for conservative measures for 4 to 6 weeks.  If no improvement, then consider lumbar MRI to assess for potential benefit of interventional pain injections.  Patient would like to be off of all medications including gabapentin.  She states gabapentin makes her excessively tired if taking a consistent 3 doses per day.  We discussed the potential benefit of rotation to pregabalin.  We also discussed the benefits of long-acting anti-inflammatories.  At this time she will continue to rotate Tylenol and ibuprofen.  Continue with gabapentin until next exam.  Return in 6 weeks to reassess symptoms after physical therapy.  Patient verbalizes understanding and agreement with plan.     PLAN:    Continue Current Treatment Plan with:   - Existing medications, as prescribed by Primary Care Provider     New Referrals:   - Physical Therapy:    Referral placed today for evaluation and treatment for strengthening and conditioning of low back pain with right sided sciatica.     Return to Clinic:   -  30-minute In-Person Appointment with Casie Weber, KVEIN, APRN, FNP-C in 4-6 weeks, or sooner if needed    Future Considerations:  - Lumbar  "MRI if no improvement with physical therapy  - If you feel more pain relief from ibuprofen, we may consider using a long-acting anti inflammatory such as Meloxicam.       HPI from initial visit on 5/16/24:   Onset: Pain began several years ago and not associated with trauma. \"I bent over to pick something up and could not stand up again.\" Pain has been getting progressively worse. Pain limits distance in walking; unable to perform gardening and hobbies r/t increased pain.      Description:   Location of pain: low back right   Radiation: to calf   Character of pain: burning, tingling, numb    Pain free between episodes: No, but much better with sitting     Any injury? ( trauma, lifting, bending, twisting?): No   Work Injury (Work Comp?): No  Intensity: Best pain level 3/10; worst pain 8/10; average pain 6/10      History:  Able to sleep?: Yes, wakes up with pain     Able to work?: N/A  - retired   History of back problems before: recurrent self limited episodes of low back pain in the past and previous herniated disc               Pain-free between episodes: No   Any previous evaluations for back pain: Spine Specialist, MRI, and X-ray   Any previous spine MRI or X-rays: Yes   Any prior procedures: Yes - LESI several years ago ([facility])   Any related surgery: No    Any cancer history: No   Accompanying Signs & Symptoms:               Fever: No               Numbness or tingling in arms, legs, feet: Yes, right leg to knee                  Weakness in arms, legs, feet: No               Dysuria: No                Blood in urine: No               Urinary incontinence: No               Bowel incontinence: No               Weight loss: No   Precipitating and/or Alleviating factors:    Does rest help: Yes   Certain positions make it better or worse: Yes, sitting helps  ; walking/standing makes worse   Does bending over, or twisting make it worse: Yes, worst with twisting     Progression of Symptoms since onset: initially better " "now getting worse   Therapies tried and outcome: rest, stretching, NSAIDS, Muscle relaxants, and BioFreeze with minor relief    Current Pain Medications:  - Gabapentin 300mg TID (H)  - Acetaminophen 500mg - 2 tabs (1000mg) 3x/day (H for OA of knees)   - Ibuprofen 200mg - 2 tabs (400mg) 2x/day (H for OA of knees)      Prior/Trialed Pain Medications:   - Meloxicam (NH)   - Cyclobenzaprine (NH)        Since the last visit, Aretha Hooks reports:   Pain score today: 3/10    Pain rating: intensity ranges from 2/10 to 7/10 on a 0-10 scale.   States low back pain has been improving with PT and home exercises.  Gets \"hot, prickle\" feeling in right lateral thigh to knee when standing or walking too long; sensation radiates from right lower back; improves when stopping to rest   Currently taking Gabapentin 300mg BID r/t increased somnolence in AM; does not want to change medication because she does have relief of pain during the night with current dosing.     REVIEW OF SYSTEMS:   ROS: 10 point ROS neg other than the symptoms noted above in the HPI.     The patient otherwise denies red flag symptoms, such as: thunderclap headache, bowel or bladder incontinence, parasthesias, weakness, saddle anesthesia, unintentional weight loss, or fever/chills/sweats.     Medical History: Any changes in medical history since they were last seen? No    Medications and Allergies reviewed. Yes       Objective    OBJECTIVE:    Physical Exam  Vitals:    06/25/24 0951   BP: 126/75   Pulse: 56        Appearance:   A&O. Patient is appropriate.   Patient is in NAD.      HHENT:  Normocephalic, atraumatic. Eyes without conjunctival injection or jaundice. Neck supple. No obvious neck masses.     Pulmonary:  Normal effort; no cough or audible wheeze      Skin: No obvious rash, lesions, or petechiae of exposed skin.    Neuro: Cranial nerves grossly intact.  Mentation and speech appropriate for age.     Psych:  Alert, without lethargy or stupor. Speech " fluent. Appropriate affect. Mood normal. Able to follow commands without difficulty. Normal mood, judgement and behavior.         Diagnostic Tests/ Imaging/ Labs resulted since last visit:   None       Assessment & Plan      VISIT DIAGNOSIS:   1. Lumbar radiculopathy  - MR Lumbar Spine w/o Contrast; Future  - Adult Pain Clinic Follow-Up Order; Future    2. Paresthesia of right lower extremity  - MR Lumbar Spine w/o Contrast; Future  - Adult Pain Clinic Follow-Up Order; Future      ASSESSMENT:    Aretha Hooks is a pleasant 68 year old female who is seen in follow up for right-sided radicular low back pain with paresthesia of right thigh. Low back pain is improving with physical therapy and home exercise plan. However, burning paresthesia from right lower back into right lateral thigh continues without change. We discussed moving forward with lumbar MRI to determine whether she is a candidate for an interventional injection to manage paresthesia.  Alternatively, we may consider changing from gabapentin to pregabalin to manage feeling of oversedation.  Patient would like to continue with current dose of gabapentin 300 mg twice daily.  Order placed for lumbar MRI to be completed upon insurance approval.  Patient advised to return to review MRI results in 3 to 4 weeks.  She verbalized understanding and agreement with plan.    PLAN:    Medication Management:   - CONTINUE taking medications as currently prescribed       Continue Current Treatment Plan with:   - Physical Therapy       New Orders:   - Diagnostic Imaging:  Lumbar MRI  at [facility].  Please call [phone] to schedule.      Return to Clinic:   -  30-minute In-Person Appointment with Casie Weber DNP, YARA, AKASH in 3-4 weeks, or sooner if needed      ___________________________________________________________________    BILLING TIME DOCUMENTATION:   TOTAL TIME includes:   Time spent preparing to see the patient: 3 minutes (reviewing  records and tests)  Time spend face to face with the patient: 26 minutes  Time spent ordering tests, medications, procedures and referrals: 0 minutes  Time spent Referring and communicating with other healthcare professionals: 0 minutes  Documenting clinical information in Epic: 3 minutes    The total TIME spent on this patient on the day of the appointment was 32 minutes.   ___________________________________________________________________    Review of Electronic Chart, Relevant Records/History: Today I have also reviewed available medical information in the patient's medical record at [facility] and Care Everywhere (if available), including relevant provider notes, laboratory work, and imaging. Please see the [facility] health questionnaire which the patient completed and reviewed with me in detail.     Casie Weber, YARA, DNP, FNP-C   [facility] Pain Management

## 2024-06-26 ENCOUNTER — THERAPY VISIT (OUTPATIENT)
Dept: PHYSICAL THERAPY | Facility: CLINIC | Age: 69
End: 2024-06-26
Attending: NURSE PRACTITIONER
Payer: COMMERCIAL

## 2024-06-26 DIAGNOSIS — M54.16 LUMBAR RADICULOPATHY: Primary | ICD-10-CM

## 2024-06-26 DIAGNOSIS — G89.29 CHRONIC BILATERAL LOW BACK PAIN WITH RIGHT-SIDED SCIATICA: ICD-10-CM

## 2024-06-26 DIAGNOSIS — M54.41 CHRONIC BILATERAL LOW BACK PAIN WITH RIGHT-SIDED SCIATICA: ICD-10-CM

## 2024-06-26 PROCEDURE — 97530 THERAPEUTIC ACTIVITIES: CPT | Mod: GP | Performed by: PHYSICAL THERAPIST

## 2024-06-26 PROCEDURE — 97110 THERAPEUTIC EXERCISES: CPT | Mod: GP | Performed by: PHYSICAL THERAPIST

## 2024-07-10 ENCOUNTER — THERAPY VISIT (OUTPATIENT)
Dept: PHYSICAL THERAPY | Facility: CLINIC | Age: 69
End: 2024-07-10
Attending: NURSE PRACTITIONER
Payer: COMMERCIAL

## 2024-07-10 DIAGNOSIS — M54.41 CHRONIC BILATERAL LOW BACK PAIN WITH RIGHT-SIDED SCIATICA: ICD-10-CM

## 2024-07-10 DIAGNOSIS — G89.29 CHRONIC BILATERAL LOW BACK PAIN WITH RIGHT-SIDED SCIATICA: ICD-10-CM

## 2024-07-10 DIAGNOSIS — M54.16 LUMBAR RADICULOPATHY: Primary | ICD-10-CM

## 2024-07-10 PROCEDURE — 97110 THERAPEUTIC EXERCISES: CPT | Mod: GP | Performed by: PHYSICAL THERAPIST

## 2024-07-16 ENCOUNTER — HOSPITAL ENCOUNTER (OUTPATIENT)
Dept: MRI IMAGING | Facility: CLINIC | Age: 69
Discharge: HOME | End: 2024-07-16
Attending: NURSE PRACTITIONER | Admitting: NURSE PRACTITIONER
Payer: COMMERCIAL

## 2024-07-16 DIAGNOSIS — R20.2 PARESTHESIA OF RIGHT LOWER EXTREMITY: ICD-10-CM

## 2024-07-16 DIAGNOSIS — M54.16 LUMBAR RADICULOPATHY: ICD-10-CM

## 2024-07-16 PROCEDURE — 72148 MRI LUMBAR SPINE W/O DYE: CPT

## 2024-07-21 DIAGNOSIS — I10 ESSENTIAL HYPERTENSION WITH GOAL BLOOD PRESSURE LESS THAN 140/90: ICD-10-CM

## 2024-07-22 RX ORDER — HYDROCHLOROTHIAZIDE 12.5 MG/1
1 CAPSULE ORAL EVERY MORNING
Qty: 90 CAPSULE | Refills: 3 | OUTPATIENT
Start: 2024-07-22

## 2024-07-22 RX ORDER — LOSARTAN POTASSIUM 50 MG/1
50 TABLET ORAL DAILY
Qty: 90 TABLET | Refills: 3 | OUTPATIENT
Start: 2024-07-22

## 2024-07-23 ENCOUNTER — OFFICE VISIT (OUTPATIENT)
Dept: PALLIATIVE MEDICINE | Facility: CLINIC | Age: 69
End: 2024-07-23
Payer: COMMERCIAL

## 2024-07-23 VITALS — SYSTOLIC BLOOD PRESSURE: 137 MMHG | DIASTOLIC BLOOD PRESSURE: 79 MMHG | HEART RATE: 55 BPM

## 2024-07-23 DIAGNOSIS — M54.16 LUMBAR RADICULOPATHY: Primary | ICD-10-CM

## 2024-07-23 DIAGNOSIS — M51.369 DDD (DEGENERATIVE DISC DISEASE), LUMBAR: ICD-10-CM

## 2024-07-23 DIAGNOSIS — M48.061 NEUROFORAMINAL STENOSIS OF LUMBAR SPINE: ICD-10-CM

## 2024-07-23 PROCEDURE — G2211 COMPLEX E/M VISIT ADD ON: HCPCS | Performed by: NURSE PRACTITIONER

## 2024-07-23 PROCEDURE — 99215 OFFICE O/P EST HI 40 MIN: CPT | Performed by: NURSE PRACTITIONER

## 2024-07-23 ASSESSMENT — PAIN SCALES - GENERAL: PAINLEVEL: MILD PAIN (3)

## 2024-07-23 NOTE — PATIENT INSTRUCTIONS
PATIENT INSTRUCTIONS:     I am recommending a multidisciplinary treatment plan to help this patient better manage her pain. The following recommendations were given to the patient. Diagnosis, treatment options, risks, benefits, and alternatives were discussed; all questions were answered. Self-care instructions were given. The patient expressed understanding of the plan for management.   Continue Current Treatment Plan with:   - Existing medications, as prescribed by Primary Care Provider     New Orders:   -  Right L4-L5 Lumbar Epidural Steroid Injection    Return to Clinic:   -  30-minute In-Person Appointment with Casie Weber DNP, YARA, AKASH in 6-8 weeks, or sooner if needed      --------------------------------------------------------------  Clinic Number:  [phone]   Call with any questions about your care and for scheduling assistance.   Calls are returned Monday through Friday between 8 AM and 4:30 PM. We usually get back to you within 2 business days depending on the issue/request.    If we are prescribing your medications:  For opioid medication refills, call the clinic or send a [place] [facility] message 7 days in advance.  Please include:  Name of requested medication  Name of the pharmacy.  For non-opioid medications, call your pharmacy directly to request a refill. Please allow 3-4 days to be processed.   Per [place] State Law:  All controlled substance prescriptions must be filled within 30 days of being written.    For those controlled substances allowing refills, pickup must occur within 30 days of last fill.      We believe regular attendance is key to your success in our program!    Any time you are unable to keep your appointment we ask that you call us at least 24 hours in advance to cancel.This will allow us to offer the appointment time to another patient.   Multiple missed appointments may lead to dismissal from the clinic.

## 2024-07-23 NOTE — PROGRESS NOTES
"  [facility] Medical Spine Follow-Up Encounter        Date of Visit: 7/23/2024    CHIEF COMPLAINT:   Chief Complaint   Patient presents with    Pain       Subjective   SUBJECTIVE    INTERVAL HISTORY:   Aretha Hooks is a 68 year old female seen for INITIAL EVALUATION on 5/16/24; last seen for FOLLOW UP on 6/25/24.  They are returning today for bilateral low back pain with right-sided sciatica.       Recommendations/ Plan at the last visit included:  Aretha Hooks is a pleasant 68 year old female who is seen in follow up for right-sided radicular low back pain with paresthesia of right thigh. Low back pain is improving with physical therapy and home exercise plan. However, burning paresthesia from right lower back into right lateral thigh continues without change. We discussed moving forward with lumbar MRI to determine whether she is a candidate for an interventional injection to manage paresthesia.  Alternatively, we may consider changing from gabapentin to pregabalin to manage feeling of oversedation.  Patient would like to continue with current dose of gabapentin 300 mg twice daily.  Order placed for lumbar MRI to be completed upon insurance approval.  Patient advised to return to review MRI results in 3 to 4 weeks.  She verbalized understanding and agreement with plan.     PLAN:    Medication Management:   - CONTINUE taking medications as currently prescribed     Continue Current Treatment Plan with:   - Physical Therapy    New Orders:   - Diagnostic Imaging:  Lumbar MRI  at [facility].  Please call [phone] to schedule.    Return to Clinic:   -  30-minute In-Person Appointment with Casie Weber, KEVIN, APRN, FNWOOD-C in 3-4 weeks, or sooner if needed      Interval history from last visit on 6/25/24:   Pain score today: 3/10    Pain rating: intensity ranges from 2/10 to 7/10 on a 0-10 scale.   States low back pain has been improving with PT and home exercises.  Gets \"hot, prickle\" " feeling in right lateral thigh to knee when standing or walking too long; sensation radiates from right lower back; improves when stopping to rest   Currently taking Gabapentin 300mg BID r/t increased somnolence in AM; does not want to change medication because she does have relief of pain during the night with current dosing.         Since the last visit, Aretha Hooks reports:   Pain score today: Mild Pain (3)   Pain rating: intensity ranges from 3/10 to 7/10 on a 0-10 scale.   Pain has been unchanged since last visit.   Has completed physical therapy.  Continues with burning and prickling sensation from right lower back, around hip into right leg to knee.   Denies RLE weakness or falls.   She returns today to review Lumbar MRI results.        REVIEW OF SYSTEMS:   ROS: 10 point ROS neg other than the symptoms noted above in the HPI.     The patient otherwise denies red flag symptoms, such as: thunderclap headache, bowel or bladder incontinence, parasthesias, weakness, saddle anesthesia, unintentional weight loss, or fever/chills/sweats.     Medical History: Any changes in medical history since they were last seen? No    Medications and Allergies reviewed. Yes       Objective    OBJECTIVE:    Physical Exam  Vitals:    07/23/24 0948   BP: 137/79   Pulse: 55        Appearance:   A&O. Patient is appropriate.   Patient is in NAD.      HHENT:  Normocephalic, atraumatic. Eyes without conjunctival injection or jaundice. Neck supple. No obvious neck masses.     Pulmonary:  Normal effort; no cough or audible wheeze      Skin: No obvious rash, lesions, or petechiae of exposed skin.    Neuro: Cranial nerves grossly intact.  Mentation and speech appropriate for age.     Psych:  Alert, without lethargy or stupor. Speech fluent. Appropriate affect. Mood normal. Able to follow commands without difficulty. Normal mood, judgement and behavior.           Diagnostic Tests/ Imaging/ Labs resulted since last visit:     MRI Lumbar  Spine w.o contrast 7/16/24  FINDINGS:  Alignment is significant for slight dextroconvex curvature. There is  also multilevel subtle grade 1 spondylolisthesis. Bone marrow  demonstrates mild degenerative endplate changes including prominent  fatty marrow change (Modic 2) surrounding the right L4-5 disc joint.  No high-grade marrow edema. Conus medullaris is unremarkable  terminating at the level of the L1-2 disc. Cauda equina is  unremarkable. Bilateral sacroiliac joint degenerative change. Small  nonspecific T2 hyperintense renal lesions, presumably benign cysts.     Segmental Analysis (similar compared to the prior):      L1-L2:  Disc height maintained. No herniation. Mild bilateral facet  arthropathy. No foraminal or spinal canal stenosis.       L2-L3:  Mild disc height loss. Minimal bulge. Mild bilateral facet  arthropathy. No foraminal or spinal canal stenosis.       L3-L4:  Mild disc height loss. Minimal bulge. Severe bilateral facet  arthropathy. No right foraminal stenosis. Mild left foraminal  stenosis. Mild spinal canal stenosis.       L4-L5:  Moderate disc height loss. Disc bulge. Severe right and  moderate left facet arthropathy. Mild right foraminal stenosis. No  left foraminal stenosis. Mild spinal canal stenosis.     L5-S1:  Disc height maintained. No herniation. Severe bilateral facet  arthropathy. No foraminal or spinal canal stenosis.                                                                   IMPRESSION:    1. Degenerative change as detailed, worst at L3-4, L4-5, and L5-S1.  2. No high-grade stenoses.      Assessment & Plan      VISIT DIAGNOSIS:   1. Lumbar radiculopathy  - PAIN INJECTION EVAL/TREAT/FOLLOW UP; Future  - Adult Pain Clinic Follow-Up Order; Future    2. Neuroforaminal stenosis of lumbar spine  - PAIN INJECTION EVAL/TREAT/FOLLOW UP; Future  - Adult Pain Clinic Follow-Up Order; Future    3. DDD (degenerative disc disease), lumbar  - PAIN INJECTION EVAL/TREAT/FOLLOW UP; Future  -  Adult Pain Clinic Follow-Up Order; Future      ASSESSMENT:    Aretha Hooks is a pleasant 68 year old female who is seen in follow up for low back pain with right-sided radiculopathy.  Review of lumbar MRI from 7/16/2024 indicates multilevel facet arthropathy with moderate right-sided foraminal stenosis at L4-5 and mild central canal stenosis at multilevels.  Patient's pain and paresthesias correlate with right-sided L4-5 neuroforaminal stenosis.  We discussed options for management including LESI.  Patient would like to move forward with interventional pain injection.  We reviewed risks, benefits and side effects and expectations for immediate and long-term results.  Patient advised to continue with physical therapy exercises at home.  Orders placed for right-sided L4-5 transforaminal LESI.  Patient advised to continue medications as currently prescribed by her primary care provider.  Return to clinic in 6 to 8 weeks to reassess symptoms after interventional injection.  Patient verbalized understanding and agreement with plan.    PLAN:    Continue Current Treatment Plan with:   - Existing medications, as prescribed by Primary Care Provider        New Orders:   -  Right L4-L5 Lumbar Epidural Steroid Injection      Return to Clinic:   -  30-minute In-Person Appointment with Casie Weber, KEVIN, APRN, FNWOOD-C in 6-8 weeks, or sooner if needed        __________________________________________________________________    BILLING TIME DOCUMENTATION:   TOTAL TIME includes:   Time spent preparing to see the patient: 5 minutes (reviewing records and tests)  Time spend face to face with the patient: 42 minutes  Time spent ordering tests, medications, procedures and referrals: 0 minutes  Time spent Referring and communicating with other healthcare professionals: 0 minutes  Documenting clinical information in Epic: 3 minutes    The total TIME spent on this patient on the day of the appointment was 50 minutes.    ___________________________________________________________________    Review of Electronic Chart, Relevant Records/History: Today I have also reviewed available medical information in the patient's medical record at [facility] and Care Everywhere (if available), including relevant provider notes, laboratory work, and imaging. Please see the [facility] health questionnaire which the patient completed and reviewed with me in detail.     Casie Weber, APRN, DNP, FNP-C   [facility]

## 2024-07-26 ENCOUNTER — TELEPHONE (OUTPATIENT)
Dept: PALLIATIVE MEDICINE | Facility: CLINIC | Age: 69
End: 2024-07-26
Payer: COMMERCIAL

## 2024-07-26 DIAGNOSIS — M54.16 LUMBAR RADICULOPATHY: Primary | ICD-10-CM

## 2024-07-26 NOTE — TELEPHONE ENCOUNTER
"Screening Questions for Radiology Injections:    Injection to be done at which interventional clinic site? [facility]    If choosing [facility] for location, please inform patient:  \"[facility] is a Hospital based clinic. Before your visit, you should check with your insurance about how it covers the charges for facility services in a hospital-based clinic.     Procedure ordered by O    Procedure ordered? Right L4-L5 TFLESI   Transforaminal Cervical LORI - [facility] [place] [facility] - No [facility] Site providers perform this procedure    What insurance would patient like us to bill for this procedure? [facility]  IF SCHEDULING IN [place] [facility] PLEASE SCHEDULE AT LEAST 7-10 BUSINESS DAYS OUT SO A PA CAN BE OBTAINED  Worker's comp or MVA (motor vehicle accident) -Any injection DO NOT SCHEDULE and route to Uriel Garcia.    [employer] insurance - For SI joint injections, DO NOT SCHEDULE and route to Joy Donis.     ALL BCBS, [employer] and [employer] - DO NOT SCHEDULE and route to Joy Donis  MEDICA- ALL INJECTIONS- route to Joy Donis    Is patient scheduled at [facility]? no   If YES, route every encounter to [facility] CARE NAVIGATION POOL [[phone]]    Is an  needed? No     Patient has a  home? (Review Grid) YES: ok    Any chance of pregnancy? NO   If YES, do NOT schedule and route to RN pool  - Dr. Nye route to Kathleen Villegas and PM&R Nurse  [[id number]]      Is patient actively being treated for cancer or immunocompromised? No  If YES, do NOT schedule and route to RN pool/ Dr. Nye's Team    Does the patient have a bleeding or clotting disorder? No   If YES, okay to schedule AND route to RN nurse / Dr. Nye's Team   (For any patients with platelet count <100, RN must forward to provider)    Is patient taking any Blood Thinners OR Antiplatelet medication?  No   If hold needed, do NOT schedule, route to RAY fraire/ Dr. Nye's " Team  Examples:   Blood Thinners: (Coumadin, Warfarin, Jantoven, Pradaxa, Xarelto, Eliquis, Edoxaban, Enoxaparin, Lovenox, Heparin, Arixtra, Fondaparinux or Fragmin)  Antiplatelet Medications: (Plavix, Brilinta or Effient)     Is patient taking any aspirin products (includes Excedrin and Fiorinal)? No   If yes route to RN pool/ Dr. Nye's Team - Do not schedule    Is patient taking any GLP-1 Antagonist (hold needed for sedation patients only) No   (semaglutide (Ozempic, Wegovy), dulaglutide (Trulicity), exenatide ER (Bydureon), tirzepatide (Mounjaro), Liraglutide (Saxenda, Victoza), semaglutide (Rybelsus), Terzepatide (Zepbound)  If YES, okay to schedule AND route to RN nurse / Dr. Nye's Team      Any allergies to contrast dye, iodine, shellfish, or numbing and steroid medications? No  If YES, schedule and add allergy information to appointment notes AND route to the RN pool/ Dr. Nye's Team  If LORI and Contrast Dye / Iodine Allergy? DO NOT SCHEDULE, route to RN pool/ Dr. Nye's Team  Allergies: Patient has no known allergies.     Does patient have an active infection or treated for one within the past week? No  Is patient currently taking any antibiotics or steroid medications?  No   For patients on chronic, preventative, or prophylactic antibiotics, procedures may be scheduled.   For patients on antibiotics for active or recent infection, schedule 4 days after completed.  For patients on steroid medications, schedule 4 days after completed.     Has the patient had a flu shot or any other vaccinations within the past 7 days? No  If yes, explain that for the vaccine to work best they need to:     wait 1 week before and 1 week after getting any Vaccine  wait 1 week before and 2 weeks after getting any Covid Vaccine   If patient has concerns about the timing, send to RN pool/ Dr. Nye's Team    Does patient have an MRI/CT?  YES: MRI Include Date and Check Procedure Scheduling Grid to see if required.  Was  the MRI/CT done within the last 3 years?  Yes   If no route to RN Pool/ Dr. Nye's Team  If yes, where was the MRI/CT done? [facility]   Refer to PACS Transmissions list for approved external locations and route to RN Pool High Priority/ Dr. Lopes Team  If MRI was not done at approved external location do NOT schedule and route to RN pool/ Dr. Grififths Team    If patient has an imaging disc, the injection MAY be scheduled but patient must bring disc to appt or appt will be cancelled.    Is patient able to transfer to a procedure table with minimal or no assistance? Yes   If no, do NOT schedule and route to RN Pool/ Dr. Nye's Team    Procedure Specific Instructions:  If celiac plexus block, informed patient NPO for 6 hours and that it is okay to take medications with sips of water, especially blood pressure medications Not Applicable       If this is for a cervical procedure, informed patient that aspirin needs to be held for 6 days.   Not Applicable    Sedation, If Sedation is ordered for any procedure, patient must be NPO for 6 hours prior to procedure Not Applicable    If IV needed:  Do not schedule procedures requiring IV placement in the first appointment of the day or first appointment after lunch. Do NOT schedule at 0745, 0815 or 1245. ok  Instructed patient to arrive 30 minutes early for IV start if required. (Check Procedure Scheduling Grid)  Not Applicable    Reminders:  If you are started on any steroids or antibiotics between now and your appointment, you must contact us because the procedure may need to be cancelled.  Yes    As a reminder, receiving steroids can decrease your body's ability to fight infection.   Would you still like to move forward with scheduling the injection?  Yes    IV Sedation is not provided for procedures. If oral anti-anxiety medication is needed, the patient should request this from their referring provider.    Instruct patient to arrive as directed prior to the  scheduled appointment time:  If IV needed 30 minutes before appointment time     For patients 85 or older we recommend having an adult stay w/ them for the remainder of the day.     If the patient is Diabetic, remind them to bring their glucometer.      Does the patient have any questions?  NO  Rachel Garcia  [facility]

## 2024-08-14 ENCOUNTER — TELEPHONE (OUTPATIENT)
Dept: PALLIATIVE MEDICINE | Facility: OTHER | Age: 69
End: 2024-08-14
Payer: COMMERCIAL

## 2024-08-14 NOTE — TELEPHONE ENCOUNTER
Preprocedure reminder call Transforaminal Lumbar LORI    (PCCVMEXPIRED) Left a brief message with no patient identifiers. Sent a BO.LT message as well     Arrival time 10:15 am    Location: [address]    Do you have a ?     If patient doesn't have a  they will need to call and reschedule    Has the patient had a flu shot or any other vaccinations within the past 7 days?     If yes, explain that for the vaccine to work best they need to:              wait 1 week before and 1 week after getting any Vaccine           wait 1 week before and 2 weeks after getting any Covid Vaccine    If patient has concerns about the timing, send to RN pool    Have you had any oral steroids or antibiotics in the last five days?  (Oral steroid ok per Marinelli but note the patient is taking it or not)    If yes check with the nurse or provider. The procedure will most likely need to be rescheduled.    Its ok to eat and drink as usual and take your  BP and diabetes medications if this applies to you.  (note patient has been informed yes or no)      To call and reschedule or talk to the nurse about any questions you may have please dial    [phone]

## 2024-08-15 ENCOUNTER — RADIOLOGY INJECTION OFFICE VISIT (OUTPATIENT)
Dept: PALLIATIVE MEDICINE | Facility: OTHER | Age: 69
End: 2024-08-15
Attending: NURSE PRACTITIONER
Payer: COMMERCIAL

## 2024-08-15 VITALS — HEART RATE: 56 BPM | SYSTOLIC BLOOD PRESSURE: 137 MMHG | DIASTOLIC BLOOD PRESSURE: 82 MMHG | OXYGEN SATURATION: 99 %

## 2024-08-15 DIAGNOSIS — M51.369 DDD (DEGENERATIVE DISC DISEASE), LUMBAR: ICD-10-CM

## 2024-08-15 DIAGNOSIS — M48.061 NEUROFORAMINAL STENOSIS OF LUMBAR SPINE: ICD-10-CM

## 2024-08-15 DIAGNOSIS — M54.16 LUMBAR RADICULOPATHY: Primary | ICD-10-CM

## 2024-08-15 PROCEDURE — 64483 NJX AA&/STRD TFRM EPI L/S 1: CPT | Mod: RT | Performed by: STUDENT IN AN ORGANIZED HEALTH CARE EDUCATION/TRAINING PROGRAM

## 2024-08-15 PROCEDURE — 250N000011 HC RX IP 250 OP 636: Performed by: STUDENT IN AN ORGANIZED HEALTH CARE EDUCATION/TRAINING PROGRAM

## 2024-08-15 PROCEDURE — 250N000009 HC RX 250: Performed by: STUDENT IN AN ORGANIZED HEALTH CARE EDUCATION/TRAINING PROGRAM

## 2024-08-15 PROCEDURE — 255N000002 HC RX 255 OP 636: Performed by: STUDENT IN AN ORGANIZED HEALTH CARE EDUCATION/TRAINING PROGRAM

## 2024-08-15 RX ORDER — DEXAMETHASONE SODIUM PHOSPHATE 10 MG/ML
10 INJECTION INTRAMUSCULAR; INTRAVENOUS ONCE
Status: COMPLETED | OUTPATIENT
Start: 2024-08-15 | End: 2024-08-15

## 2024-08-15 RX ORDER — LIDOCAINE HYDROCHLORIDE 10 MG/ML
1 INJECTION, SOLUTION EPIDURAL; INFILTRATION; INTRACAUDAL; PERINEURAL ONCE
Status: COMPLETED | OUTPATIENT
Start: 2024-08-15 | End: 2024-08-15

## 2024-08-15 RX ADMIN — IOHEXOL 10 ML: 180 INJECTION INTRAVENOUS at 11:00

## 2024-08-15 RX ADMIN — LIDOCAINE HYDROCHLORIDE 1 ML: 10 INJECTION, SOLUTION EPIDURAL; INFILTRATION; INTRACAUDAL; PERINEURAL at 11:24

## 2024-08-15 RX ADMIN — DEXAMETHASONE SODIUM PHOSPHATE 10 MG: 10 INJECTION, SOLUTION INTRAMUSCULAR; INTRAVENOUS at 11:24

## 2024-08-15 ASSESSMENT — PAIN SCALES - GENERAL
PAINLEVEL: MILD PAIN (2)
PAINLEVEL: MODERATE PAIN (4)

## 2024-08-15 NOTE — PATIENT INSTRUCTIONS
[facility]   Procedure Discharge Instructions    Today you saw:    Dr. Marinelli    You had a Right L4-5 Epidural Steroid Injection    Medications used:  Lidocaine Dexamethasone Omnipaque           If you were holding your blood thinning medication, please restart taking it: N/A  Be cautious when walking. Numbness and/or weakness in the lower extremities may occur for up to 6-8 hours after the procedure due to effect of the local anesthetic  Do not drive for 6 hours. The effect of the local anesthetic could slow your reflexes.   You may resume your regular activities after 24 hours  Avoid strenuous activity for the first 24 hours  You may shower, however avoid swimming, tub baths or hot tubs for 24 hours following your procedure  You may have a mild to moderate increase in pain for several days following the injection.  It may take up to 14 days for the steroid medication to start working although you may feel the effect as early as a few days after the procedure.     You may use ice packs for 10-15 minutes, 3 to 4 times a day at the injection site for comfort  Do not use heat to painful areas for 6 to 8 hours. This will give the local anesthetic time to wear off and prevent you from accidentally burning your skin.   Unless you have been directed to avoid the use of anti-inflammatory medications (NSAIDS), you may use medications such as ibuprofen, Aleve or Tylenol for pain control if needed.   If you were fasting, you may resume your normal diet and medications after the procedure  If you have diabetes, check your blood sugar more frequently than usual as your blood sugar may be higher than normal for 10-14 days following a steroid injection. Contact your doctor who manages your diabetes if your blood sugar is higher than usual  Possible side effects of steroids that you may experience include flushing, elevated blood pressure, increased appetite, mild headaches and  restlessness.  All of these symptoms will get better with time.  If you experience any of the following, call the Pain Clinic at [phone]:  -Fever over 100 degree F  -Swelling, bleeding, redness, drainage, warmth at the injection site  -Progressive weakness or numbness in your legs or arms  -Loss of bowel or bladder function  -Unusual headache that is not relieved by Tylenol or other pain reliever  -Unusual new onset of pain that is not improving

## 2024-08-15 NOTE — NURSING NOTE
Pre-procedure Intake  If YES to any questions or NO to having a   Please complete laminated checklist and leave on the computer keyboard for Provider, verbally inform provider if able.    For SCS Trial, RFA's or any sedation procedure:  Have you been fasting? NA  If yes, for how long?     Are you taking any any blood thinners such as Coumadin, Warfarin, Jantoven, Pradaxa Xarelto, Eliquis, Edoxaban, Enoxaparin, Lovenox, Heparin, Arixtra, Fondaparinux, or Fragmin? OR Antiplatelet medication such as Plavix, Brilinta, or Effient?   No   If yes, when did you take your last dose?     Do you take aspirin?  No  If cervical procedure, have you held aspirin for 6 days?   NA    Is the Pt taking any GLP-1 Antagonist (hold needed for sedation patients only)  (semaglutide (Ozempic, Wegovy), dulaglutide (Trulicity), exenatide ER (Bydureon), tirzepatide (Mounjaro), Liraglutide (Saxenda, Victoza), semaglutide (Rybelsus)     NA  If yes, when did you take your last dose?     Do you have any allergies to contrast dye, iodine, steroid and/or numbing medications?  NO    Are you currently taking antibiotics or have an active infection?  NO    Have you had a fever/elevated temperature within the past week? NO    Are you currently taking oral steroids? NO    Do you have a ? Yes    Are you pregnant or breastfeeding?  NO    Have you received any vaccinations in the last week? NO    Notify provider and RNs if systolic BP >170, diastolic BP >100, P >100 or O2 sats < 90%     Kathleen Posey MA  [facility] [place]

## 2024-08-15 NOTE — PROGRESS NOTES
Pre procedure Diagnosis: lumbar radiculopathy   Post procedure Diagnosis: Same  Procedure performed: right L4 transforaminal epidural steroid injection, CPT code 63959  Anesthesia: none  Complications: none immediately  Operators: Demario Marinelli MD    Indications:   Aretha Hooks is a 68 year old female was sent for a lumbar transforaminal epidural steroid injection      Options/alternatives, benefits and risks were discussed with the patient including bleeding, infection, tissue trauma, numbness, weakness, paralysis, spinal cord injury, radiation exposure, headache and reaction to medications. Questions were answered to her satisfaction and she agrees to proceed. Voluntary informed consent was obtained and signed.     Vitals were reviewed: Yes  Allergies were reviewed:  Yes   Medications were reviewed:  Yes   Pre-procedure pain score: 4/10    Procedure:  After getting informed consent, patient was brought into the procedure suite and was placed in a prone position on the procedure table.   A Pause for the Cause was performed.  Patient was prepped and draped in sterile fashion.     After identifying the right L4-5 neuroforamen, the C-arm was rotated to a right lateral oblique angle.  A total of 3ml of Lidocaine 1% was used to anesthetize the skin and the needle track at a skin entry site coaxial with the fluoroscopy beam, and overriding the superior aspect of the neuroforamen.  A 22 gauge 5 inch spinal needle was advanced under intermittent fluoroscopy until it entered the foramen superiorly.    The position was then inspected from anteroposterior and lateral views, and the needle adjusted appropriately.  A total of 3ml of Omnipaque-180 was injected, confirming appropriate position, with spread into the nerve root sheath and the epidural space, with no intravascular uptake. Visualization of active injection under live fluoroscopy or digital subtraction angiography further confirmed the above appropriate  contrast spread.    1ml of preservative free 1% lidocaine with 10mg of dexamethasone was injected.  The needle was flushed with lidocaine and removed.    During the procedure, the patient DID experience paresthesias corresponding to the nerve root near final needle placement.  Hemostasis was achieved, the area was cleaned, and bandaids were placed when appropriate.  The patient tolerated the procedure well, and was taken to the recovery room.    Images were saved to PACS.    Post-procedure pain score: 2/10  Follow-up includes:   -f/u with referring provider    Demario Marinelli MD  Interventional Pain Medicine  [facility]

## 2024-08-28 ENCOUNTER — TRANSFERRED RECORDS (OUTPATIENT)
Dept: HEALTH INFORMATION MANAGEMENT | Facility: CLINIC | Age: 69
End: 2024-08-28
Payer: COMMERCIAL

## 2024-09-18 ASSESSMENT — PAIN SCALES - PAIN ENJOYMENT GENERAL ACTIVITY SCALE (PEG)
AVG_PAIN_PASTWEEK: 1
INTERFERED_ENJOYMENT_LIFE: 1
INTERFERED_GENERAL_ACTIVITY: 1
PEG_TOTALSCORE: 1

## 2024-09-19 ENCOUNTER — OFFICE VISIT (OUTPATIENT)
Dept: PALLIATIVE MEDICINE | Facility: CLINIC | Age: 69
End: 2024-09-19
Attending: NURSE PRACTITIONER
Payer: COMMERCIAL

## 2024-09-19 VITALS — SYSTOLIC BLOOD PRESSURE: 143 MMHG | HEART RATE: 59 BPM | DIASTOLIC BLOOD PRESSURE: 83 MMHG

## 2024-09-19 DIAGNOSIS — M51.369 DDD (DEGENERATIVE DISC DISEASE), LUMBAR: ICD-10-CM

## 2024-09-19 DIAGNOSIS — M48.061 NEUROFORAMINAL STENOSIS OF LUMBAR SPINE: ICD-10-CM

## 2024-09-19 DIAGNOSIS — M54.16 LUMBAR RADICULOPATHY: Primary | ICD-10-CM

## 2024-09-19 PROCEDURE — 99213 OFFICE O/P EST LOW 20 MIN: CPT | Performed by: NURSE PRACTITIONER

## 2024-09-19 ASSESSMENT — PAIN SCALES - GENERAL: PAINLEVEL: NO PAIN (0)

## 2024-09-19 NOTE — PATIENT INSTRUCTIONS
PATIENT INSTRUCTIONS:     I am recommending a multidisciplinary treatment plan to help this patient better manage her pain. The following recommendations were given to the patient. Diagnosis, treatment options, risks, benefits, and alternatives were discussed; all questions were answered. Self-care instructions were given. The patient expressed understanding of the plan for management.     Medication Management:   If you want to try tapering off Gabapentin 300mg, I would recommend decreasing by one dose and then wait 3-4 weeks before discontinuing completely. In other words, take Gabapentin 300mg at bedtime for 3-4 weeks, then stop. Please reach out to your primary care provider to let her know you desire to taper off.   Continue Current Treatment Plan with:   Continue with home exercise program     Return to Clinic:   As needed, if pain returns in 4-6 months or more, please follow up in clinic.   Future Considerations:   If you feel the need to repeat the lumbar steroid injection over the next few weeks, send a [employer] message and I can place the order.      ----------------------------------------------------------------  Clinic Number:  [phone]   Call with any questions about your care and for scheduling assistance.   Calls are returned Monday through Friday between 8 AM and 4:30 PM. We usually get back to you within 2 business days depending on the issue/request.    If we are prescribing your medications:  For opioid medication refills, call the clinic or send a [employer] message 7 days in advance.  Please include:  Name of requested medication  Name of the pharmacy.  For non-opioid medications, call your pharmacy directly to request a refill. Please allow 3-4 days to be processed.   Per [place] State Law:  All controlled substance prescriptions must be filled within 30 days of being written.    For those controlled substances allowing refills, pickup must occur within 30 days of last fill.      We believe regular  attendance is key to your success in our program!    Any time you are unable to keep your appointment we ask that you call us at least 24 hours in advance to cancel.This will allow us to offer the appointment time to another patient.   Multiple missed appointments may lead to dismissal from the clinic.

## 2024-09-19 NOTE — PROGRESS NOTES
[facility] Spine Follow-Up Encounter        Date of Visit: 9/19/2024    CHIEF COMPLAINT:   Chief Complaint   Patient presents with    Pain       Subjective   SUBJECTIVE    INTERVAL HISTORY:   Aretha Hooks is a 68 year old female seen for INITIAL EVALUATION on 5/16/24; last seen for FOLLOW UP on 7/23/24.  They are returning today for bilateral low back pain with right-sided sciatica.       Recommendations/ Plan at the last visit included:  Aretha Hooks is a pleasant 68 year old female who is seen in follow up for low back pain with right-sided radiculopathy.  Review of lumbar MRI from 7/16/2024 indicates multilevel facet arthropathy with moderate right-sided foraminal stenosis at L4-5 and mild central canal stenosis at multilevels.  Patient's pain and paresthesias correlate with right-sided L4-5 neuroforaminal stenosis.  We discussed options for management including LESI.  Patient would like to move forward with interventional pain injection.  We reviewed risks, benefits and side effects and expectations for immediate and long-term results.  Patient advised to continue with physical therapy exercises at home.  Orders placed for right-sided L4-5 transforaminal LESI.  Patient advised to continue medications as currently prescribed by her primary care provider.  Return to clinic in 6 to 8 weeks to reassess symptoms after interventional injection.  Patient verbalized understanding and agreement with plan.     PLAN:    Continue Current Treatment Plan with:   - Existing medications, as prescribed by Primary Care Provider     New Orders:   -  Right L4-L5 Lumbar Epidural Steroid Injection    Return to Clinic:   -  30-minute In-Person Appointment with Casie Weber, KEVIN, APRN, FNP-C in 6-8 weeks, or sooner if needed      Interval history from last visit on 7/23/24:   Pain score today: Mild Pain (3)   Pain rating: intensity ranges from 3/10 to 7/10 on a 0-10 scale.   Pain has been unchanged since  last visit.   Has completed physical therapy.  Continues with burning and prickling sensation from right lower back, around hip into right leg to knee.   Denies RLE weakness or falls.   She returns today to review Lumbar MRI results.           Since the last visit, Aretha Hooks reports:   Pain score today: No Pain (0)   Pain rating: intensity ranges from 0/10 to 1/10, and averages 0/10 on a 0-10 scale.   Aretha underwent right L4 TFLESI on 8/15/24 and experienced >80% improvement in pain and function in the days afterward.    Today, she reports ability to walk more consistently, longer distances. Has increased activities and chores around the house. Working to balance the intensity of getting work done around the house vs take breaks for rest.  She would like to wean off Gabapentin and has dropped dose from 300mg TID to 300mg BID.     REVIEW OF SYSTEMS:   ROS: 10 point ROS neg other than the symptoms noted above in the HPI.     The patient otherwise denies red flag symptoms, such as: thunderclap headache, bowel or bladder incontinence, parasthesias, weakness, saddle anesthesia, unintentional weight loss, or fever/chills/sweats.     Medical History: Any changes in medical history since they were last seen? No    Medications and Allergies reviewed. Yes       Objective    OBJECTIVE:    Physical Exam  Vitals:    09/19/24 0957   BP: (!) 143/83   Pulse: 59        Appearance:   A&O. Patient is appropriate.   Patient is in NAD.      HHENT:  Normocephalic, atraumatic. Eyes without conjunctival injection or jaundice. Neck supple. No obvious neck masses.     Pulmonary:  Normal effort; no cough or audible wheeze      Skin: No obvious rash, lesions, or petechiae of exposed skin.    Neuro: Cranial nerves grossly intact.  Mentation and speech appropriate for age.     Psych:  Alert, without lethargy or stupor. Speech fluent. Appropriate affect. Mood normal. Able to follow commands without difficulty. Normal mood, judgement and  "behavior.         Gait pattern:   Patient has an antalgic gait pattern:NO  Gait favors the neither side.  Mobility and/or assistive devices? NO          Diagnostic Tests/ Imaging/ Labs resulted since last visit:   None       Assessment & Plan      VISIT DIAGNOSIS:   1. Lumbar radiculopathy  - Adult Pain Clinic Follow-Up Order  - Adult Pain Clinic Follow-Up Order; Future    2. Neuroforaminal stenosis of lumbar spine  - Adult Pain Clinic Follow-Up Order  - Adult Pain Clinic Follow-Up Order; Future    3. DDD (degenerative disc disease), lumbar  - Adult Pain Clinic Follow-Up Order  - Adult Pain Clinic Follow-Up Order; Future      ASSESSMENT:    Aretha Hooks is a pleasant 68 year old female who is seen in follow up for chronic bilateral low back pain with lumbar radiculopathy.  She has experienced significant improvement in pain and function since right sided transforaminal LESI approximately 4 weeks ago.  At this time she reports she is essentially pain-free.  She continues to participate in her daily home exercise program and has increased frequency and distance and walking.  She does have exacerbations of pain when \"overdoing it\"; but is attempting to balance her drive to complete tasks around the house with the need to use stop and rest to prevent exacerbation of pain.  At this time, she does not need to return to clinic unless symptoms return or worsen.  If she does have exacerbation or return of pain in the next few weeks then she is advised to send [employer] message with update and I will order repeat injection.  If longer than 4 to 6 months then she will need to return for inpatient evaluation if she desires to repeat injection.  Patient verbalizes understanding and agreement with plan.    PLAN:    Medication Management:   If you want to try tapering off Gabapentin 300mg, I would recommend decreasing by one dose and then wait 3-4 weeks before discontinuing completely. In other words, take Gabapentin 300mg at " bedtime for 3-4 weeks, then stop. Please reach out to your primary care provider to let her know you desire to taper off.     Continue Current Treatment Plan with:   Continue with home exercise program        Return to Clinic:   As needed, if pain returns in 4-6 months or more, please follow up in clinic.     Future Considerations:   If you feel the need to repeat the lumbar steroid injection over the next few weeks, send a [employer] message and I can place the order.      __________________________________________________________________    BILLING TIME DOCUMENTATION:   TOTAL TIME includes:   Time spent preparing to see the patient: 3 minutes (reviewing records and tests)  Time spend face to face with the patient: 16 minutes  Time spent ordering tests, medications, procedures and referrals: 0 minutes  Time spent Referring and communicating with other healthcare professionals: 0 minutes  Documenting clinical information in Epic: 4 minutes    The total TIME spent on this patient on the day of the appointment was 23 minutes.   ___________________________________________________________________    Review of Electronic Chart, Relevant Records/History: Today I have also reviewed available medical information in the patient's medical record at [facility] ([facility]) and Care Everywhere (if available), including relevant provider notes, laboratory work, and imaging. Please see the [facility] health questionnaire which the patient completed and reviewed with me in detail.     Casie Weber, APRN, DNP, FNP-C   [facility]

## 2024-10-25 ENCOUNTER — TELEPHONE (OUTPATIENT)
Dept: FAMILY MEDICINE | Facility: CLINIC | Age: 69
End: 2024-10-25
Payer: COMMERCIAL

## 2024-10-25 DIAGNOSIS — I10 ESSENTIAL HYPERTENSION WITH GOAL BLOOD PRESSURE LESS THAN 140/90: ICD-10-CM

## 2024-10-25 RX ORDER — LOSARTAN POTASSIUM 50 MG/1
50 TABLET ORAL DAILY
Qty: 100 TABLET | Refills: 1 | Status: SHIPPED | OUTPATIENT
Start: 2024-10-25

## 2024-10-25 NOTE — TELEPHONE ENCOUNTER
Patient has [facility] coverage and with this insurance plan, the patient is eligible to receive certain prescriptions as a 100-day supply at the 90-day supply cost.      Prescriptions updated to 100-day supply per protocol: Ethan SawantD, [facility]  Population Health Pharmacist  [phone]

## 2024-12-12 ENCOUNTER — TRANSFERRED RECORDS (OUTPATIENT)
Dept: HEALTH INFORMATION MANAGEMENT | Facility: CLINIC | Age: 69
End: 2024-12-12
Payer: COMMERCIAL

## 2024-12-18 ENCOUNTER — HOSPITAL ENCOUNTER (OUTPATIENT)
Dept: MAMMOGRAPHY | Facility: CLINIC | Age: 69
Discharge: HOME | End: 2024-12-18
Attending: NURSE PRACTITIONER
Payer: COMMERCIAL

## 2024-12-18 DIAGNOSIS — Z12.31 VISIT FOR SCREENING MAMMOGRAM: ICD-10-CM

## 2024-12-18 PROCEDURE — 77067 SCR MAMMO BI INCL CAD: CPT

## 2024-12-18 PROCEDURE — 77063 BREAST TOMOSYNTHESIS BI: CPT

## 2025-01-02 ENCOUNTER — OFFICE VISIT (OUTPATIENT)
Dept: FAMILY MEDICINE | Facility: CLINIC | Age: 70
End: 2025-01-02
Payer: COMMERCIAL

## 2025-01-02 VITALS
WEIGHT: 210.6 LBS | SYSTOLIC BLOOD PRESSURE: 136 MMHG | RESPIRATION RATE: 18 BRPM | BODY MASS INDEX: 35.96 KG/M2 | HEIGHT: 64 IN | TEMPERATURE: 98 F | DIASTOLIC BLOOD PRESSURE: 78 MMHG | OXYGEN SATURATION: 99 % | HEART RATE: 66 BPM

## 2025-01-02 DIAGNOSIS — Z01.818 PREOP GENERAL PHYSICAL EXAM: Primary | ICD-10-CM

## 2025-01-02 DIAGNOSIS — M17.12 PRIMARY OSTEOARTHRITIS OF LEFT KNEE: ICD-10-CM

## 2025-01-02 DIAGNOSIS — I10 ESSENTIAL HYPERTENSION WITH GOAL BLOOD PRESSURE LESS THAN 140/90: ICD-10-CM

## 2025-01-02 PROBLEM — R20.9 SENSORY DISORDER: Status: RESOLVED | Noted: 2018-04-11 | Resolved: 2025-01-02

## 2025-01-02 LAB
ANION GAP SERPL CALCULATED.3IONS-SCNC: 12 MMOL/L (ref 7–15)
BUN SERPL-MCNC: 9.7 MG/DL (ref 8–23)
CALCIUM SERPL-MCNC: 9.9 MG/DL (ref 8.8–10.4)
CHLORIDE SERPL-SCNC: 105 MMOL/L (ref 98–107)
CREAT SERPL-MCNC: 0.75 MG/DL (ref 0.51–0.95)
EGFRCR SERPLBLD CKD-EPI 2021: 87 ML/MIN/1.73M2
ERYTHROCYTE [DISTWIDTH] IN BLOOD BY AUTOMATED COUNT: 14.3 % (ref 10–15)
GLUCOSE SERPL-MCNC: 93 MG/DL (ref 70–99)
HCO3 SERPL-SCNC: 26 MMOL/L (ref 22–29)
HCT VFR BLD AUTO: 33.5 % (ref 35–47)
HGB BLD-MCNC: 11.4 G/DL (ref 11.7–15.7)
MCH RBC QN AUTO: 27.9 PG (ref 26.5–33)
MCHC RBC AUTO-ENTMCNC: 34 G/DL (ref 31.5–36.5)
MCV RBC AUTO: 82 FL (ref 78–100)
PLATELET # BLD AUTO: 261 10E3/UL (ref 150–450)
POTASSIUM SERPL-SCNC: 3.3 MMOL/L (ref 3.4–5.3)
RBC # BLD AUTO: 4.08 10E6/UL (ref 3.8–5.2)
SODIUM SERPL-SCNC: 143 MMOL/L (ref 135–145)
WBC # BLD AUTO: 7 10E3/UL (ref 4–11)

## 2025-01-02 ASSESSMENT — PAIN SCALES - GENERAL: PAINLEVEL_OUTOF10: EXTREME PAIN (8)

## 2025-01-02 NOTE — PATIENT INSTRUCTIONS
How to Take Your Medication Before Surgery  Preoperative Medication Instructions   Antiplatelet or Anticoagulation Medication Instructions   - Patient is on no antiplatelet or anticoagulation medications.    Additional Medication Instructions  Take all scheduled medications on the day of surgery EXCEPT for modifications listed below:   - Herbal medications and vitamins: DO NOT TAKE 14 days prior to surgery.   - ACE/ARB/ARNI (lisinopril, enalapril, losartan, valsartan, olmesartan, sacubritril/valsartan) : DO NOT TAKE on day of surgery (minimum 11 hours for general anesthesia).   - Diuretics (furosemide, hydrochlorothiazide, chlorothalidone): DO NOT TAKE on the day of surgery.   - ibuprofen (Advil, Motrin): DO NOT TAKE 1 day before surgery.    - naproxen (Aleve, Naprosyn): DO NOT TAKE 4 days before surgery.        Patient Education   Preparing for Your Surgery  For Adults  Getting started  In most cases, a nurse will call to review your health history and instructions. They will give you an arrival time based on your scheduled surgery time. Please be ready to share:  Your doctor's clinic name and phone number  Your medical, surgical, and anesthesia history  A list of allergies and sensitivities  A list of medicines, including herbal treatments and over-the-counter drugs  Whether the patient has a legal guardian (ask how to send us the papers in advance)  Note: You may not receive a call if you were seen at our PAC (Preoperative Assessment Center).  Please tell us if you're pregnant--or if there's any chance you might be pregnant. Some surgeries may injure a fetus (unborn baby), so they require a pregnancy test. Surgeries that are safe for a fetus don't always need a test, and you can choose whether to have one.   Preparing for surgery  Within 10 to 30 days of surgery: Have a pre-op exam (sometimes called an H&P, or History and Physical). This can be done at a clinic or pre-operative center.  If you're having a  , you may not need this exam. Talk to your care team.  At your pre-op exam, talk to your care team about all medicines you take. (This includes CBD oil and any drugs, such as THC, marijuana, and other forms of cannabis.) If you need to stop any medicine before surgery, ask when to start taking it again.  This is for your safety. Many medicines and drugs can make you bleed too much during surgery. Some change how well surgery (anesthesia) drugs work.  Call your insurance company to let them know you're having surgery. (If you don't have insurance, call [phone].)  Call your clinic if there's any change in your health. This includes a scrape or scratch near the surgery site, or any signs of a cold (sore throat, runny nose, cough, rash, fever).  Eating and drinking guidelines  For your safety: Unless your surgeon tells you otherwise, follow the guidelines below.  Eat and drink as normal until 8 hours before you arrive for surgery. After that, no food or milk. You can spit out gum when you arrive.  Drink clear liquids until 2 hours before you arrive. These are liquids you can see through, like water, Gatorade, and Propel Water. They also include plain black coffee and tea (no cream or milk).  No alcohol for 24 hours before you arrive. The night before surgery, stop any drinks that contain THC.  If your care team tells you to take medicine on the morning of surgery, it's okay to take it with a sip of water. No other medicines or drugs are allowed (including CBD oil)--follow your care team's instructions.  If you have questions the day of surgery, call your hospital or surgery center.   Preventing infection  Shower or bathe the night before and the morning of surgery. Follow the instructions your clinic gave you. (If no instructions, use regular soap.)  Don't shave or clip hair near your surgery site. We'll remove the hair if needed.  Don't smoke or vape the morning of surgery. No chewing tobacco for 6 hours  before you arrive. A nicotine patch is okay. You may spit out nicotine gum when you arrive.  For some surgeries, the surgeon will tell you to fully quit smoking and nicotine.  We will make every effort to keep you safe from infection. We will:  Clean our hands often with soap and water (or an alcohol-based hand rub).  Clean the skin at your surgery site with a special soap that kills germs.  Give you a special gown to keep you warm. (Cold raises the risk of infection.)  Wear hair covers, masks, gowns, and gloves during surgery.  Give antibiotic medicine, if prescribed. Not all surgeries need this medicine.  What to bring on the day of surgery  Photo ID and insurance card  Copy of your health care directive, if you have one  Glasses and hearing aids (bring cases)  You can't wear contacts during surgery  Inhaler and eye drops, if you use them (tell us about these when you arrive)  CPAP machine or breathing device, if you use them  A few personal items, if spending the night  If you have . . .  A pacemaker, ICD (cardiac defibrillator), or other implant: Bring the ID card.  An implanted stimulator: Bring the remote control.  A legal guardian: Bring a copy of the certified (court-stamped) guardianship papers.  Please remove any jewelry, including body piercings. Leave jewelry and other valuables at home.  If you're going home the day of surgery  You must have a responsible adult drive you home. They should stay with you overnight as well.  If you don't have someone to stay with you, and you aren't safe to go home alone, we may keep you overnight. Insurance often won't pay for this.  After surgery  If it's hard to control your pain or you need more pain medicine, please call your surgeon's office.  Questions?   If you have any questions for your care team, list them here:    ____________________________________________________________________________________________________________________________________________________________________________________________________________________________________________________________  For informational purposes only. Not to replace the advice of your health care provider. Copyright   2003, 2019 [facility]. All rights reserved. Clinically reviewed by Chaparro Minaya MD. [employer] 991004 - REV 08/24.

## 2025-01-02 NOTE — PROGRESS NOTES
Preoperative Evaluation  [facility]  [address] [address]  Phone: [phone]  Primary Provider: Judy Jiménez DNP  Pre-op Performing Provider: Judy Jiménez DNP  Jan 2, 2025 1/1/2025   Surgical Information   What procedure is being done? Total left knee replacement   Facility or Hospital where procedure/surgery will be performed: [facility]   Who is doing the procedure / surgery? Dr Crhistian Tim   Date of surgery / procedure: 1/23/2025   Time of surgery / procedure: Not  given yet   Where do you plan to recover after surgery? at home with family     Fax number for surgical facility: Fax to [facility] to [phone]    Assessment & Plan     The proposed surgical procedure is considered INTERMEDIATE risk.    Preop general physical exam     - CBC with platelets  - Basic metabolic panel  (Ca, Cl, CO2, Creat, Gluc, K, Na, BUN)  - EKG 12-lead complete w/read - Clinics    Essential hypertension with goal blood pressure less than 140/90  Controlled today.     Primary osteoarthritis of left knee  Tylenol only discussed prn for pain    EKG reviewed showed Sinus Bradycardia - patient on beta blocker - no t wave changes.    Possible Sleep Apnea:           No data to display                    - No identified additional risk factors other than previously addressed    Antiplatelet or Anticoagulation Medication Instructions   - Patient is on no antiplatelet or anticoagulation medications.    Additional Medication Instructions  Take all scheduled medications on the day of surgery EXCEPT for modifications listed below:   - Herbal medications and vitamins: DO NOT TAKE 14 days prior to surgery.   - ACE/ARB/ARNI (lisinopril, enalapril, losartan, valsartan, olmesartan, sacubritril/valsartan) : DO NOT TAKE on day of surgery (minimum 11 hours for general anesthesia).   - Diuretics (furosemide, hydrochlorothiazide, chlorothalidone): DO  NOT TAKE on the day of surgery.   - ibuprofen (Advil, Motrin): DO NOT TAKE 1 day before surgery.    - naproxen (Aleve, Naprosyn): DO NOT TAKE 4 days before surgery.     Recommendation  Approval given to proceed with proposed procedure, without further diagnostic evaluation.    Carlos Jenkins is a 69 year old, presenting for the following:  Pre-Op Exam          1/2/2025     2:11 PM   Additional Questions   Roomed by Ciera MORGAN CMA   Accompanied by Self         1/2/2025   Forms   Any forms needing to be completed Yes         1/2/2025     2:11 PM   Patient Reported Additional Medications   Patient reports taking the following new medications None     HPI related to upcoming procedure: history of left knee osteoarthritis, ongoing chronic knee pain.         1/1/2025   Pre-Op Questionnaire   Have you ever had a heart attack or stroke? No   Have you ever had surgery on your heart or blood vessels, such as a stent placement, a coronary artery bypass, or surgery on an artery in your head, neck, heart, or legs? No   Do you have chest pain with activity? No   Do you have a history of heart failure? No   Do you currently have a cold, bronchitis or symptoms of other infection? No   Do you have a cough, shortness of breath, or wheezing? No   Do you or anyone in your family have previous history of blood clots? No   Do you or does anyone in your family have a serious bleeding problem such as prolonged bleeding following surgeries or cuts? No   Have you ever had problems with anemia or been told to take iron pills? No   Have you had any abnormal blood loss such as black, tarry or bloody stools, or abnormal vaginal bleeding? No   Have you ever had a blood transfusion? No   Are you willing to have a blood transfusion if it is medically needed before, during, or after your surgery? Yes   Have you or any of your relatives ever had problems with anesthesia? No   Do you have sleep apnea, excessive snoring or daytime drowsiness? (!) YES -  has CPAP at home.   Do you have a CPAP machine? Yes   Do you have any artifical heart valves or other implanted medical devices like a pacemaker, defibrillator, or continuous glucose monitor? No   Do you have artificial joints? No   Are you allergic to latex? No     Health Care Directive  Patient does not have a Health Care Directive: Discussed advance care planning with patient; however, patient declined at this time.    Preoperative Review of    reviewed - no record of controlled substances prescribed.       Status of Chronic Conditions:  See problem list for active medical problems.  Problems all longstanding and stable, except as noted/documented.  See ROS for pertinent symptoms related to these conditions.    Patient Active Problem List    Diagnosis Date Noted    Lumbar radiculopathy 05/29/2024     Priority: Medium    Chronic bilateral low back pain with right-sided sciatica 05/29/2024     Priority: Medium    DDD (degenerative disc disease), lumbar 02/01/2022     Priority: Medium    Primary osteoarthritis of both knees 02/01/2022     Priority: Medium    Brain aneurysm 04/11/2018     Priority: Medium    Sensory disorder 04/11/2018     Priority: Medium    Tubular adenoma of colon 11/16/2017     Priority: Medium     Collected: 11/10/2017   Received: 11/10/2017   Reported: 11/15/2017 23:36   Ordering Phy(s): SARAH BETH RODRIGUEZ     For improved result formatting, select 'View Enhanced Report Format'   under Linked Documents section.     SPECIMEN(S):   Colon polyp, right     FINAL DIAGNOSIS:   Colon polyp, right:   - Tubular adenoma.   - Negative for high grade dysplasia or malignancy.       Vitamin B12 deficiency (non anemic) 11/18/2015     Priority: Medium    CARDIOVASCULAR SCREENING; LDL GOAL LESS THAN 130 11/18/2015     Priority: Medium    Hypertriglyceridemia 05/31/2012     Priority: Medium    Other specified disorder of rectum and anus 12/07/2007     Priority: Medium      11/07  - A 11 mm polyp at 6  cm. Resected and retrieved.                      - The examination was otherwise normal pathology  Carcinoid tumor:  follow up with Dr White Gastrienterologist      Essential hypertension with goal blood pressure less than 140/90 11/03/2005     Priority: Medium     Medications:  Diovan 160 mg, labetalol    , chlorthalidone 25 mg bid  BP Readings from Last 3 Encounters:   11/18/15 136/86   12/10/14 123/82   04/07/14 122/89               Past Medical History:   Diagnosis Date    Arthritis 10 years    Back    ASCUS of cervix with negative high risk HPV 03/2011    per [facility] repeat Pap + HPV cotesting in 3 years    Carcinoid tumor  of colon     Other vitamin B12 deficiency anemia     injection of B12 monthly    Unspecified essential hypertension 1990     Past Surgical History:   Procedure Laterality Date    CHOLECYSTECTOMY  15 years    COLONOSCOPY N/A 11/10/2017    Procedure: COMBINED COLONOSCOPY, SINGLE OR MULTIPLE BIOPSY/POLYPECTOMY BY BIOPSY;  colonoscopy;  Surgeon: Emil Vaz MD;  Location: [facility]    COLONOSCOPY N/A 10/12/2022    Procedure: COLONOSCOPY;  Surgeon: Chet Riggs DO;  Location: [facility]    SURGICAL HISTORY OF -   01/01/2004    Cervical dilatation with curettage and hysteroscopy with rollerball endometrial ablation    SURGICAL HISTORY OF -   01/01/2004    Laparoscopic Cholecystectomy    SURGICAL HISTORY OF -   01/01/1988    Cryocautery to cervix for bleeding    SURGICAL HISTORY OF -   01/01/2004    endometrial ablation    SURGICAL HISTORY OF -   01/01/2008    carcinoid tumor rectum  [facility] Yeison Lawson    [facility] APPENDECTOMY  Age 12    Appendectomy    Z LIGATE FALLOPIAN TUBE  01/01/1984    Tubal Ligation    [facility] KNEE SCOPE,MED+LAT MENIS REPAIR  01/01/2009    left knee     Current Outpatient Medications   Medication Sig Dispense Refill    CALTRATE 600 + D 600-200 MG-IU OR TABS 2 TABLET DAILY      cyanocobalamin (VITAMIN B-12) 1000 MCG tablet Take 1 tablet (1,000 mcg) by mouth  "daily 90 tablet 0    Fish Oil OIL 1 capsule twice daily - unknown dose      hydrochlorothiazide (MICROZIDE) 12.5 MG capsule Take 1 capsule (12.5 mg) by mouth every morning 90 capsule 3    labetalol (NORMODYNE) 100 MG tablet Take 3 tablets (300 mg) by mouth 2 times daily 540 tablet 3    losartan (COZAAR) 50 MG tablet Take 1 tablet (50 mg) by mouth daily. 100 tablet 1    MULTIVITAMIN TABS   OR 1 TABLET DAILY      gabapentin (NEURONTIN) 300 MG capsule Take 1 capsule (300 mg) by mouth 3 times daily (Patient not taking: Reported on 1/2/2025) 270 capsule 3       No Known Allergies     Social History     Tobacco Use    Smoking status: Never     Passive exposure: Past    Smokeless tobacco: Never   Substance Use Topics    Alcohol use: Yes     Comment: occ     Family History   Problem Relation Age of Onset    Cancer Mother         Renal    Hypertension Father     Respiratory Father         uses cpap    Eye Disorder Father         cataracts    Hypertension Brother     Musculoskeletal Disorder Sister         back surg.    Skin Cancer Sister     Genitourinary Problems Sister     Cancer Sister         skin cancer    Gynecology Sister         hysterectomy    Skin Cancer Sister     C.A.D. Paternal Grandmother     Breast Cancer No family hx of     Cancer - colorectal No family hx of     Colon Cancer No family hx of      History   Drug Use No             Review of Systems  Constitutional, HEENT, cardiovascular, pulmonary, GI, , musculoskeletal, neuro, skin, endocrine and psych systems are negative, except as otherwise noted.    Objective    BP (!) 140/82   Pulse 66   Temp 98  F (36.7  C) (Tympanic)   Resp 18   Ht 5' 4.25\" (1.632 m)   Wt 210 lb 9.6 oz (95.5 kg)   LMP 04/01/2008   SpO2 99%   BMI 35.87 kg/m     Estimated body mass index is 35.87 kg/m  as calculated from the following:    Height as of this encounter: 5' 4.25\" (1.632 m).    Weight as of this encounter: 210 lb 9.6 oz (95.5 kg).  Physical Exam  GENERAL: alert and " no distress  HENT: ear canals and TM's normal, nose and mouth without ulcers or lesions  NECK: no adenopathy, no asymmetry, masses, or scars  RESP: lungs clear to auscultation - no rales, rhonchi or wheezes  CV: regular rate and rhythm, normal S1 S2, no S3 or S4, no murmur, click or rub, no peripheral edema  ABDOMEN: soft, nontender, no hepatosplenomegaly, no masses and bowel sounds normal  MS: no gross musculoskeletal defects noted, no edema  SKIN: no suspicious lesions or rashes  NEURO: Normal strength and tone, mentation intact and speech normal  PSYCH: mentation appears normal, affect normal/bright    Recent Labs   Lab Test 04/25/24  1142      POTASSIUM 3.9   CR 0.83        Diagnostics  Labs pending at this time.  Results will be reviewed when available.   EKG required for required by surgery center for > 65 and not completed in the last 90 days.     Revised Cardiac Risk Index (RCRI)  The patient has the following serious cardiovascular risks for perioperative complications:   - No serious cardiac risks = 0 points     RCRI Interpretation: 0 points: Class I (very low risk - 0.4% complication rate)         Signed Electronically by: Judy Jiménez DNP  A copy of this evaluation report is provided to the requesting physician.

## 2025-01-20 ENCOUNTER — LAB (OUTPATIENT)
Dept: LAB | Facility: CLINIC | Age: 70
End: 2025-01-20
Payer: COMMERCIAL

## 2025-01-20 DIAGNOSIS — D64.9 LOW HEMOGLOBIN: ICD-10-CM

## 2025-01-20 DIAGNOSIS — E87.6 HYPOKALEMIA: ICD-10-CM

## 2025-01-20 LAB
HGB BLD-MCNC: 11.9 G/DL (ref 11.7–15.7)
POTASSIUM SERPL-SCNC: 3.9 MMOL/L (ref 3.4–5.3)

## 2025-01-20 PROCEDURE — 85018 HEMOGLOBIN: CPT

## 2025-01-20 PROCEDURE — 36415 COLL VENOUS BLD VENIPUNCTURE: CPT

## 2025-01-20 PROCEDURE — 84132 ASSAY OF SERUM POTASSIUM: CPT

## 2025-01-23 ENCOUNTER — TRANSFERRED RECORDS (OUTPATIENT)
Dept: HEALTH INFORMATION MANAGEMENT | Facility: CLINIC | Age: 70
End: 2025-01-23
Payer: COMMERCIAL

## 2025-02-06 ENCOUNTER — TRANSFERRED RECORDS (OUTPATIENT)
Dept: HEALTH INFORMATION MANAGEMENT | Facility: CLINIC | Age: 70
End: 2025-02-06
Payer: COMMERCIAL

## 2025-03-05 ENCOUNTER — MYC REFILL (OUTPATIENT)
Dept: FAMILY MEDICINE | Facility: CLINIC | Age: 70
End: 2025-03-05
Payer: COMMERCIAL

## 2025-03-05 DIAGNOSIS — I10 ESSENTIAL HYPERTENSION WITH GOAL BLOOD PRESSURE LESS THAN 140/90: ICD-10-CM

## 2025-03-06 RX ORDER — LOSARTAN POTASSIUM 50 MG/1
50 TABLET ORAL DAILY
Qty: 100 TABLET | Refills: 1 | OUTPATIENT
Start: 2025-03-06

## 2025-03-06 RX ORDER — LABETALOL 100 MG/1
300 TABLET, FILM COATED ORAL 2 TIMES DAILY
Qty: 540 TABLET | Refills: 3 | OUTPATIENT
Start: 2025-03-06

## 2025-04-28 SDOH — HEALTH STABILITY: PHYSICAL HEALTH: ON AVERAGE, HOW MANY DAYS PER WEEK DO YOU ENGAGE IN MODERATE TO STRENUOUS EXERCISE (LIKE A BRISK WALK)?: 7 DAYS

## 2025-04-28 SDOH — HEALTH STABILITY: PHYSICAL HEALTH: ON AVERAGE, HOW MANY MINUTES DO YOU ENGAGE IN EXERCISE AT THIS LEVEL?: 60 MIN

## 2025-04-28 ASSESSMENT — SOCIAL DETERMINANTS OF HEALTH (SDOH): HOW OFTEN DO YOU GET TOGETHER WITH FRIENDS OR RELATIVES?: ONCE A WEEK

## 2025-04-29 ENCOUNTER — OFFICE VISIT (OUTPATIENT)
Dept: FAMILY MEDICINE | Facility: CLINIC | Age: 70
End: 2025-04-29
Payer: COMMERCIAL

## 2025-04-29 VITALS
DIASTOLIC BLOOD PRESSURE: 80 MMHG | BODY MASS INDEX: 35.59 KG/M2 | SYSTOLIC BLOOD PRESSURE: 126 MMHG | RESPIRATION RATE: 16 BRPM | TEMPERATURE: 97.7 F | HEIGHT: 65 IN | HEART RATE: 63 BPM | OXYGEN SATURATION: 98 %

## 2025-04-29 DIAGNOSIS — D64.9 LOW HEMOGLOBIN: ICD-10-CM

## 2025-04-29 DIAGNOSIS — E66.01 CLASS 2 SEVERE OBESITY WITH BODY MASS INDEX (BMI) OF 35 TO 39.9 WITH SERIOUS COMORBIDITY (H): ICD-10-CM

## 2025-04-29 DIAGNOSIS — Z13.6 CARDIOVASCULAR SCREENING; LDL GOAL LESS THAN 130: ICD-10-CM

## 2025-04-29 DIAGNOSIS — E66.812 CLASS 2 SEVERE OBESITY WITH BODY MASS INDEX (BMI) OF 35 TO 39.9 WITH SERIOUS COMORBIDITY (H): ICD-10-CM

## 2025-04-29 DIAGNOSIS — I10 ESSENTIAL HYPERTENSION WITH GOAL BLOOD PRESSURE LESS THAN 140/90: ICD-10-CM

## 2025-04-29 DIAGNOSIS — Z00.00 ENCOUNTER FOR MEDICARE ANNUAL WELLNESS EXAM: Primary | ICD-10-CM

## 2025-04-29 DIAGNOSIS — E53.8 VITAMIN B12 DEFICIENCY (NON ANEMIC): ICD-10-CM

## 2025-04-29 LAB
ANION GAP SERPL CALCULATED.3IONS-SCNC: 12 MMOL/L (ref 7–15)
BUN SERPL-MCNC: 8.2 MG/DL (ref 8–23)
CALCIUM SERPL-MCNC: 10.3 MG/DL (ref 8.8–10.4)
CHLORIDE SERPL-SCNC: 107 MMOL/L (ref 98–107)
CHOLEST SERPL-MCNC: 138 MG/DL
CREAT SERPL-MCNC: 0.76 MG/DL (ref 0.51–0.95)
EGFRCR SERPLBLD CKD-EPI 2021: 85 ML/MIN/1.73M2
ERYTHROCYTE [DISTWIDTH] IN BLOOD BY AUTOMATED COUNT: 13.3 % (ref 10–15)
FASTING STATUS PATIENT QL REPORTED: YES
FASTING STATUS PATIENT QL REPORTED: YES
GLUCOSE SERPL-MCNC: 108 MG/DL (ref 70–99)
HCO3 SERPL-SCNC: 26 MMOL/L (ref 22–29)
HCT VFR BLD AUTO: 39.3 % (ref 35–47)
HDLC SERPL-MCNC: 58 MG/DL
HGB BLD-MCNC: 13.3 G/DL (ref 11.7–15.7)
LDLC SERPL CALC-MCNC: 60 MG/DL
MCH RBC QN AUTO: 29.5 PG (ref 26.5–33)
MCHC RBC AUTO-ENTMCNC: 33.8 G/DL (ref 31.5–36.5)
MCV RBC AUTO: 87 FL (ref 78–100)
NONHDLC SERPL-MCNC: 80 MG/DL
PLATELET # BLD AUTO: 285 10E3/UL (ref 150–450)
POTASSIUM SERPL-SCNC: 4.3 MMOL/L (ref 3.4–5.3)
RBC # BLD AUTO: 4.51 10E6/UL (ref 3.8–5.2)
SODIUM SERPL-SCNC: 145 MMOL/L (ref 135–145)
TRIGL SERPL-MCNC: 101 MG/DL
WBC # BLD AUTO: 6 10E3/UL (ref 4–11)

## 2025-04-29 PROCEDURE — 3079F DIAST BP 80-89 MM HG: CPT | Performed by: NURSE PRACTITIONER

## 2025-04-29 PROCEDURE — 1126F AMNT PAIN NOTED NONE PRSNT: CPT | Performed by: NURSE PRACTITIONER

## 2025-04-29 PROCEDURE — 85027 COMPLETE CBC AUTOMATED: CPT | Performed by: NURSE PRACTITIONER

## 2025-04-29 PROCEDURE — 99213 OFFICE O/P EST LOW 20 MIN: CPT | Mod: 25 | Performed by: NURSE PRACTITIONER

## 2025-04-29 PROCEDURE — 3074F SYST BP LT 130 MM HG: CPT | Performed by: NURSE PRACTITIONER

## 2025-04-29 PROCEDURE — 80048 BASIC METABOLIC PNL TOTAL CA: CPT | Performed by: NURSE PRACTITIONER

## 2025-04-29 PROCEDURE — 36415 COLL VENOUS BLD VENIPUNCTURE: CPT | Performed by: NURSE PRACTITIONER

## 2025-04-29 PROCEDURE — G0439 PPPS, SUBSEQ VISIT: HCPCS | Performed by: NURSE PRACTITIONER

## 2025-04-29 PROCEDURE — 80061 LIPID PANEL: CPT | Performed by: NURSE PRACTITIONER

## 2025-04-29 RX ORDER — HYDROCHLOROTHIAZIDE 12.5 MG/1
1 CAPSULE ORAL EVERY MORNING
Qty: 90 CAPSULE | Refills: 3 | Status: SHIPPED | OUTPATIENT
Start: 2025-04-29

## 2025-04-29 RX ORDER — LOSARTAN POTASSIUM 50 MG/1
50 TABLET ORAL DAILY
Qty: 90 TABLET | Refills: 3 | Status: SHIPPED | OUTPATIENT
Start: 2025-04-29

## 2025-04-29 RX ORDER — LABETALOL 100 MG/1
300 TABLET, FILM COATED ORAL 2 TIMES DAILY
Qty: 540 TABLET | Refills: 3 | Status: SHIPPED | OUTPATIENT
Start: 2025-04-29

## 2025-04-29 ASSESSMENT — PAIN SCALES - GENERAL: PAINLEVEL_OUTOF10: NO PAIN (0)

## 2025-04-29 NOTE — PATIENT INSTRUCTIONS
Patient Education   Preventive Care Advice   This is general advice given by our system to help you stay healthy. However, your care team may have specific advice just for you. Please talk to your care team about your preventive care needs.  Nutrition  Eat 5 or more servings of fruits and vegetables each day.  Try wheat bread, brown rice and whole grain pasta (instead of white bread, rice, and pasta).  Get enough calcium and vitamin D. Check the label on foods and aim for 100% of the RDA (recommended daily allowance).  Lifestyle  Exercise at least 150 minutes each week  (30 minutes a day, 5 days a week).  Do muscle strengthening activities 2 days a week. These help control your weight and prevent disease.  No smoking.  Wear sunscreen to prevent skin cancer.  Have a dental exam and cleaning every 6 months.  Yearly exams  See your health care team every year to talk about:  Any changes in your health.  Any medicines your care team has prescribed.  Preventive care, family planning, and ways to prevent chronic diseases.  Shots (vaccines)   HPV shots (up to age 26), if you've never had them before.  Hepatitis B shots (up to age 59), if you've never had them before.  COVID-19 shot: Get this shot when it's due.  Flu shot: Get a flu shot every year.  Tetanus shot: Get a tetanus shot every 10 years.  Pneumococcal, hepatitis A, and RSV shots: Ask your care team if you need these based on your risk.  Shingles shot (for age 50 and up)  General health tests  Diabetes screening:  Starting at age 35, Get screened for diabetes at least every 3 years.  If you are younger than age 35, ask your care team if you should be screened for diabetes.  Cholesterol test: At age 39, start having a cholesterol test every 5 years, or more often if advised.  Bone density scan (DEXA): At age 50, ask your care team if you should have this scan for osteoporosis (brittle bones).  Hepatitis C: Get tested at least once in your life.  STIs (sexually  transmitted infections)  Before age 24: Ask your care team if you should be screened for STIs.  After age 24: Get screened for STIs if you're at risk. You are at risk for STIs (including HIV) if:  You are sexually active with more than one person.  You don't use condoms every time.  You or a partner was diagnosed with a sexually transmitted infection.  If you are at risk for HIV, ask about PrEP medicine to prevent HIV.  Get tested for HIV at least once in your life, whether you are at risk for HIV or not.  Cancer screening tests  Cervical cancer screening: If you have a cervix, begin getting regular cervical cancer screening tests starting at age 21.  Breast cancer scan (mammogram): If you've ever had breasts, begin having regular mammograms starting at age 40. This is a scan to check for breast cancer.  Colon cancer screening: It is important to start screening for colon cancer at age 45.  Have a colonoscopy test every 10 years (or more often if you're at risk) Or, ask your provider about stool tests like a FIT test every year or Cologuard test every 3 years.  To learn more about your testing options, visit:   .  For help making a decision, visit:   https://bit.ly/yd97054.  Prostate cancer screening test: If you have a prostate, ask your care team if a prostate cancer screening test (PSA) at age 55 is right for you.  Lung cancer screening: If you are a current or former smoker ages 50 to 80, ask your care team if ongoing lung cancer screenings are right for you.  For informational purposes only. Not to replace the advice of your health care provider. Copyright   2023 [employer]. All rights reserved. Clinically reviewed by the [facility] Transitions Program. [address] [id number] - REV 01/24.

## 2025-04-29 NOTE — PROGRESS NOTES
"Preventive Care Visit  [facility]  Judy iJménez DNP, Family Medicine  Apr 29, 2025      Assessment & Plan     Encounter for Medicare annual wellness exam    Class 3 severe obesity  Walking daily and staying active.       Essential hypertension with goal blood pressure less than 140/90  Well controlled. Had some mild hypokalemia with last labs.  If still low given blood pressure well controlled may consider stopping hydrochlorothiazide and monitor blood pressure at home.    - hydrochlorothiazide (MICROZIDE) 12.5 MG capsule  Dispense: 90 capsule; Refill: 3  - labetalol (NORMODYNE) 100 MG tablet  Dispense: 540 tablet; Refill: 3  - losartan (COZAAR) 50 MG tablet  Dispense: 90 tablet; Refill: 3  - Basic metabolic panel  (Ca, Cl, CO2, Creat, Gluc, K, Na, BUN)    CARDIOVASCULAR SCREENING; LDL GOAL LESS THAN 130     - Lipid panel reflex to direct LDL Fasting    Vitamin B12 deficiency (non anemic)  Taking daily supplement.    Low hemoglobin  On Iron currently - recheck prior to surgery showed improved.  Taking Fe+ a few times per week.  Increasing Iron in diet.    - CBC with platelets      Patient has been advised of split billing requirements and indicates understanding: Yes        BMI  Estimated body mass index is 35.59 kg/m  as calculated from the following:    Height as of this encounter: 1.638 m (5' 4.5\").    Weight as of 1/2/25: 95.5 kg (210 lb 9.6 oz).   Weight management plan: Discussed healthy diet and exercise guidelines    Counseling  Appropriate preventive services were addressed with this patient via screening, questionnaire, or discussion as appropriate for fall prevention, nutrition, physical activity, Tobacco-use cessation, social engagement, weight loss and cognition.  Checklist reviewing preventive services available has been given to the patient.  Reviewed patient's diet, addressing concerns and/or questions.       Follow-up  No follow-ups on file.    Subjective   Alice " is a 69 year old, presenting for the following:  Wellness Visit and Hypertension        4/29/2025     9:09 AM   Additional Questions   Roomed by Hay CEJA CMA   Accompanied by self         4/29/2025   Declines Weight   Did patient decline having their weight taken? Yes         4/29/2025     9:09 AM   Patient Reported Additional Medications   Patient reports taking the following new medications Generic Allergy Tab Daily - unknown name, Iron daily ( unknown what type or dose)     HPI       Hypertension Follow-up    Do you check your blood pressure regularly outside of the clinic? No   Are you following a low salt diet? Yes  Are your blood pressures ever more than 140 on the top number (systolic) OR more   than 90 on the bottom number (diastolic), for example 140/90? No    BP Readings from Last 2 Encounters:   04/29/25 126/80   01/02/25 136/78     Reports lower Hgb prior to surgery - started on Iron every other day.  No fatigue or other symptoms.  Colonoscopy UTD - due in 2027.  No blood in urine or stools.    Advance Care Planning    Discussed advance care planning with patient; however, patient declined at this time.        4/28/2025   General Health   How would you rate your overall physical health? Excellent   Feel stress (tense, anxious, or unable to sleep) Not at all         4/28/2025   Nutrition   Diet: Regular (no restrictions)         4/28/2025   Exercise   Days per week of moderate/strenous exercise 7 days   Average minutes spent exercising at this level 60 min         4/28/2025   Social Factors   Frequency of gathering with friends or relatives Once a week   Worry food won't last until get money to buy more No   Food not last or not have enough money for food? No   Do you have housing? (Housing is defined as stable permanent housing and does not include staying outside in a car, in a tent, in an abandoned building, in an overnight shelter, or couch-surfing.) Yes   Are you worried about losing your housing? No    Lack of transportation? No   Unable to get utilities (heat,electricity)? No         4/28/2025   Fall Risk   Fallen 2 or more times in the past year? No   Trouble with walking or balance? No          4/28/2025   Activities of Daily Living- Home Safety   Needs help with the following daily activites None of the above   Safety concerns in the home None of the above         4/28/2025   Dental   Dentist two times every year? Yes         4/28/2025   Hearing Screening   Hearing concerns? None of the above         4/28/2025   Driving Risk Screening   Patient/family members have concerns about driving No         4/28/2025   General Alertness/Fatigue Screening   Have you been more tired than usual lately? No         4/28/2025   Urinary Incontinence Screening   Bothered by leaking urine in past 6 months No         Today's PHQ-2 Score:       4/28/2025    10:41 AM   PHQ-2 ( 1999 Pf[employer])   Q1: Little interest or pleasure in doing things 0   Q2: Feeling down, depressed or hopeless 0   PHQ-2 Score 0    Q1: Little interest or pleasure in doing things Not at all   Q2: Feeling down, depressed or hopeless Not at all   PHQ-2 Score 0       Patient-reported           4/28/2025   Substance Use   Alcohol more than 3/day or more than 7/wk No   Do you have a current opioid prescription? No   How severe/bad is pain from 1 to 10? 1/10   Do you use any other substances recreationally? No     Social History     Tobacco Use    Smoking status: Never     Passive exposure: Past    Smokeless tobacco: Never   Vaping Use    Vaping status: Never Used   Substance Use Topics    Alcohol use: Yes     Comment: occ    Drug use: No          12/18/2024   LAST FHS-7 RESULTS   1st degree relative breast or ovarian cancer No   Any relative bilateral breast cancer No   Any male have breast cancer No   Any ONE woman have BOTH breast AND ovarian cancer No   Any woman with breast cancer before 50yrs No   2 or more relatives with breast AND/OR ovarian cancer No   2 or  more relatives with breast AND/OR bowel cancer No      Mammogram Screening - Mammogram every 1-2 years updated in Health Maintenance based on mutual decision making      History of abnormal Pap smear: No - age 65 or older with adequate negative prior screening test results (3 consecutive negative cytology results, 2 consecutive negative cotesting results, or 2 consecutive negative HrHPV test results within 10 years, with the most recent test occurring within the recommended screening interval for the test used)        Latest Ref Rng & Units 2019     3:21 PM 2016     6:15 PM 2016    12:00 AM   PAP / HPV   PAP (Historical)  NIL   NIL    HPV 16 DNA NEG^Negative Negative  Negative     HPV 18 DNA NEG^Negative Negative  Negative     Other HR HPV NEG^Negative Negative  Negative       ASCVD Risk   The 10-year ASCVD risk score (Fiordaliza RIZVI, et al., 2019) is: 7.6%    Values used to calculate the score:      Age: 69 years      Sex: Female      Is Non- : No      Diabetic: No      Tobacco smoker: No      Systolic Blood Pressure: 126 mmHg      Is BP treated: Yes      HDL Cholesterol: 61 mg/dL      Total Cholesterol: 147 mg/dL    Fracture Risk Assessment Tool  Link to Frax Calculator  Use the information below to complete the Frax calculator  : 1955  Sex: female  Weight (kg): 95.5 kg (actual weight)  Height (cm): 163.8 cm  Previous Fragility Fracture:  No  History of parent with fractured hip:  No  Current Smoking:  No  Patient has been on glucocorticoids for more than 3 months (5mg/day or more): No  Rheumatoid Arthritis on Problem List:  No  Secondary Osteoporosis on Problem List:  No  Consumes 3 or more units of alcohol per day: No  Femoral Neck BMD (g/cm2)     Reviewed and updated as needed this visit by Provider                    Past Medical History:   Diagnosis Date    Arthritis 10 years    Back    ASCUS of cervix with negative high risk HPV 2011    per [facility] repeat  Pap + HPV cotesting in 3 years    Carcinoid tumor  of colon     Other vitamin B12 deficiency anemia     injection of B12 monthly    Unspecified essential hypertension      Past Surgical History:   Procedure Laterality Date    CHOLECYSTECTOMY  15 years    COLONOSCOPY N/A 11/10/2017    Procedure: COMBINED COLONOSCOPY, SINGLE OR MULTIPLE BIOPSY/POLYPECTOMY BY BIOPSY;  colonoscopy;  Surgeon: Emil Vaz MD;  Location: [facility]    COLONOSCOPY N/A 10/12/2022    Procedure: COLONOSCOPY;  Surgeon: Chet Riggs DO;  Location: [facility]    SURGICAL HISTORY OF -   2004    Cervical dilatation with curettage and hysteroscopy with rollerball endometrial ablation    SURGICAL HISTORY OF -   2004    Laparoscopic Cholecystectomy    SURGICAL HISTORY OF -   1988    Cryocautery to cervix for bleeding    SURGICAL HISTORY OF -   2004    endometrial ablation    SURGICAL HISTORY OF -   2008    carcinoid tumor rectum  [facility]    ZZC APPENDECTOMY  Age 12    Appendectomy    ZZC LIGATE FALLOPIAN TUBE  1984    Tubal Ligation    ZZ KNEE SCOPE,MED+LAT MENIS REPAIR  2009    left knee     OB History    Para Term  AB Living   4 4 0 0 0 4   SAB IAB Ectopic Multiple Live Births   0 0 0 0 0      # Outcome Date GA Lbr Tyron/2nd Weight Sex Type Anes PTL Lv   4 Para            3 Para            2 Para            1 Para              Lab work is in process  Labs reviewed in EPIC  BP Readings from Last 3 Encounters:   25 126/80   25 136/78   24 (!) 143/83    Wt Readings from Last 3 Encounters:   25 95.5 kg (210 lb 9.6 oz)   10/12/22 81.6 kg (180 lb)   19 81.6 kg (180 lb)                  Patient Active Problem List   Diagnosis    Essential hypertension with goal blood pressure less than 140/90    Other specified disorder of rectum and anus    Vitamin B12 deficiency (non anemic)    CARDIOVASCULAR SCREENING; LDL GOAL LESS  THAN 130    Tubular adenoma of colon    Brain aneurysm    DDD (degenerative disc disease), lumbar    Primary osteoarthritis of both knees    Lumbar radiculopathy    Chronic bilateral low back pain with right-sided sciatica     Past Surgical History:   Procedure Laterality Date    CHOLECYSTECTOMY  15 years    COLONOSCOPY N/A 11/10/2017    Procedure: COMBINED COLONOSCOPY, SINGLE OR MULTIPLE BIOPSY/POLYPECTOMY BY BIOPSY;  colonoscopy;  Surgeon: Emil Vaz MD;  Location: [facility]    COLONOSCOPY N/A 10/12/2022    Procedure: COLONOSCOPY;  Surgeon: Chet Riggs DO;  Location: [facility]    SURGICAL HISTORY OF -   01/01/2004    Cervical dilatation with curettage and hysteroscopy with rollerball endometrial ablation    SURGICAL HISTORY OF -   01/01/2004    Laparoscopic Cholecystectomy    SURGICAL HISTORY OF -   01/01/1988    Cryocautery to cervix for bleeding    SURGICAL HISTORY OF - 01/01/2004    endometrial ablation    SURGICAL HISTORY OF -   01/01/2008    carcinoid tumor rectum  [facility]    ZZC APPENDECTOMY  Age 12    Appendectomy    ZZC LIGATE FALLOPIAN TUBE  01/01/1984    Tubal Ligation    ZZ KNEE SCOPE,MED+LAT MENIS REPAIR  01/01/2009    left knee       Social History     Tobacco Use    Smoking status: Never     Passive exposure: Past    Smokeless tobacco: Never   Substance Use Topics    Alcohol use: Yes     Comment: occ     Family History   Problem Relation Age of Onset    Cancer Mother         Renal    Hypertension Father     Respiratory Father         uses cpap    Eye Disorder Father         cataracts    Hypertension Brother     Musculoskeletal Disorder Sister         back surg.    Skin Cancer Sister     Genitourinary Problems Sister     Cancer Sister         skin cancer    Gynecology Sister         hysterectomy    Skin Cancer Sister     C.A.D. Paternal Grandmother     Breast Cancer No family hx of     Cancer - colorectal No family hx of     Colon Cancer No family hx of           Current Outpatient Medications   Medication Sig Dispense Refill    CALTRATE 600 + D 600-200 MG-IU OR TABS 2 TABLET DAILY      cyanocobalamin (VITAMIN B-12) 1000 MCG tablet Take 1 tablet (1,000 mcg) by mouth daily 90 tablet 0    Fish Oil OIL 1 capsule twice daily - unknown dose      hydrochlorothiazide (MICROZIDE) 12.5 MG capsule Take 1 capsule (12.5 mg) by mouth every morning 90 capsule 3    labetalol (NORMODYNE) 100 MG tablet Take 3 tablets (300 mg) by mouth 2 times daily 540 tablet 3    losartan (COZAAR) 50 MG tablet Take 1 tablet (50 mg) by mouth daily. 100 tablet 1    MULTIVITAMIN TABS   OR 1 TABLET DAILY       No Known Allergies  Recent Labs   Lab Test 01/20/25  0943 01/02/25  1523 04/25/24  1142 03/31/23  1206 02/01/22  1147 02/01/22  1147 12/22/20  0713 06/08/19  0959   LDL  --   --  69 77  --  56 63  --    HDL  --   --  61 63  --  61 73  --    TRIG  --   --  86 96  --  153* 115  --    CR  --  0.75 0.83 0.76   < > 0.70 0.74 0.73   GFRESTIMATED  --  87 77 86   < > >90 86 88   GFRESTBLACK  --   --   --   --   --   --  >90 >90   POTASSIUM 3.9 3.3* 3.9 3.5   < > 3.6 3.5 3.6    < > = values in this interval not displayed.           Current providers sharing in care for this patient include:  Patient Care Team:  Judy Jiménez DNP as PCP - General (Nurse Practitioner - Family)  Judy Jiménez DNP as Assigned PCP    The following health maintenance items are reviewed in Epic and correct as of today:  Health Maintenance   Topic Date Due    HEPATITIS C SCREENING  Never done    COVID-19 Vaccine (5 - 2024-25 season) 09/01/2024    DTAP/TDAP/TD IMMUNIZATION (3 - Td or Tdap) 12/10/2024    ANNUAL REVIEW OF HM ORDERS  04/25/2025    MEDICARE ANNUAL WELLNESS VISIT  04/25/2025    BMP  01/02/2026    FALL RISK ASSESSMENT  04/29/2026    MAMMO SCREENING  12/18/2026    COLORECTAL CANCER SCREENING  10/12/2027    DIABETES SCREENING  01/02/2028    LIPID  04/25/2029    ADVANCE CARE PLANNING  01/02/2030    RSV VACCINE  "(1 - 1-dose 75+ series) 12/23/2032    DEXA  04/27/2038    PHQ-2 (once per calendar year)  Completed    INFLUENZA VACCINE  Completed    Pneumococcal Vaccine: 50+ Years  Completed    ZOSTER IMMUNIZATION  Completed    HPV IMMUNIZATION  Aged Out    MENINGITIS IMMUNIZATION  Aged Out    PAP  Discontinued         Review of Systems  Constitutional, HEENT, cardiovascular, pulmonary, GI, , musculoskeletal, neuro, skin, endocrine and psych systems are negative, except as otherwise noted.     Objective    Exam  /80 (BP Location: Right arm, Patient Position: Sitting, Cuff Size: Adult Large)   Pulse 63   Temp 97.7  F (36.5  C) (Tympanic)   Resp 16   Ht 1.638 m (5' 4.5\")   LMP 04/01/2008   SpO2 98%   BMI 35.59 kg/m     Estimated body mass index is 35.59 kg/m  as calculated from the following:    Height as of this encounter: 1.638 m (5' 4.5\").    Weight as of 1/2/25: 95.5 kg (210 lb 9.6 oz).    Wt Readings from Last 4 Encounters:   01/02/25 95.5 kg (210 lb 9.6 oz)   10/12/22 81.6 kg (180 lb)   09/28/19 81.6 kg (180 lb)   07/26/19 79.4 kg (175 lb)       Physical Exam  GENERAL: alert and no distress  EYES: Eyes grossly normal to inspection, PERRL and conjunctivae and sclerae normal  HENT: ear canals and TM's normal, nose and mouth without ulcers or lesions  NECK: no adenopathy, no asymmetry, masses, or scars  RESP: lungs clear to auscultation - no rales, rhonchi or wheezes  CV: regular rate and rhythm, normal S1 S2, no S3 or S4, no murmur, click or rub, no peripheral edema  ABDOMEN: soft, nontender, no hepatosplenomegaly, no masses and bowel sounds normal  MS: no gross musculoskeletal defects noted, no edema  SKIN: no suspicious lesions or rashes  NEURO: Normal strength and tone, mentation intact and speech normal  PSYCH: mentation appears normal, affect normal/bright        4/29/2025   Mini Cog   Clock Draw Score 2 Normal   3 Item Recall 3 objects recalled   Mini Cog Total Score 5              Signed Electronically " by: Judy Jiménez, DNP

## 2025-05-11 DIAGNOSIS — I10 ESSENTIAL HYPERTENSION WITH GOAL BLOOD PRESSURE LESS THAN 140/90: ICD-10-CM

## 2025-05-12 NOTE — TELEPHONE ENCOUNTER
Please confirm if patient request this from [facility]. We recently sent medication to [facility] for patient.   Katarina Chatterjee RN on 5/12/2025 at 3:41 PM

## 2025-05-13 RX ORDER — LABETALOL 100 MG/1
300 TABLET, FILM COATED ORAL 2 TIMES DAILY
Qty: 540 TABLET | Refills: 3 | OUTPATIENT
Start: 2025-05-13

## 2025-06-23 ENCOUNTER — HOSPITAL ENCOUNTER (OUTPATIENT)
Dept: MRI IMAGING | Facility: CLINIC | Age: 70
Discharge: HOME | End: 2025-06-23
Admitting: NURSE PRACTITIONER
Payer: COMMERCIAL

## 2025-06-23 DIAGNOSIS — I67.1 BERRY ANEURYSM: ICD-10-CM

## 2025-06-23 PROCEDURE — 70544 MR ANGIOGRAPHY HEAD W/O DYE: CPT

## 2025-08-25 ASSESSMENT — PAIN SCALES - PAIN ENJOYMENT GENERAL ACTIVITY SCALE (PEG)
PEG_TOTALSCORE: 5.67
INTERFERED_ENJOYMENT_LIFE: 5
AVG_PAIN_PASTWEEK: 7
INTERFERED_GENERAL_ACTIVITY: 5

## 2025-08-28 ENCOUNTER — OFFICE VISIT (OUTPATIENT)
Dept: PALLIATIVE MEDICINE | Facility: CLINIC | Age: 70
End: 2025-08-28
Payer: COMMERCIAL

## 2025-08-28 VITALS — DIASTOLIC BLOOD PRESSURE: 74 MMHG | SYSTOLIC BLOOD PRESSURE: 135 MMHG | HEART RATE: 63 BPM

## 2025-08-28 DIAGNOSIS — M54.16 LUMBAR RADICULOPATHY: Primary | ICD-10-CM

## 2025-08-28 DIAGNOSIS — R20.2 PARESTHESIA OF RIGHT LOWER EXTREMITY: ICD-10-CM

## 2025-08-28 ASSESSMENT — PAIN SCALES - GENERAL: PAINLEVEL_OUTOF10: SEVERE PAIN (8)

## (undated) RX ORDER — LIDOCAINE HYDROCHLORIDE 10 MG/ML
INJECTION, SOLUTION EPIDURAL; INFILTRATION; INTRACAUDAL; PERINEURAL
Status: DISPENSED
Start: 2022-10-12

## (undated) RX ORDER — LIDOCAINE HYDROCHLORIDE 10 MG/ML
INJECTION, SOLUTION EPIDURAL; INFILTRATION; INTRACAUDAL; PERINEURAL
Status: DISPENSED
Start: 2017-11-10

## (undated) RX ORDER — BUPIVACAINE HYDROCHLORIDE 5 MG/ML
INJECTION, SOLUTION EPIDURAL; INTRACAUDAL
Status: DISPENSED
Start: 2019-10-23

## (undated) RX ORDER — METHYLPREDNISOLONE ACETATE 40 MG/ML
INJECTION, SUSPENSION INTRA-ARTICULAR; INTRALESIONAL; INTRAMUSCULAR; SOFT TISSUE
Status: DISPENSED
Start: 2019-10-23

## (undated) RX ORDER — GLYCOPYRROLATE 0.2 MG/ML
INJECTION, SOLUTION INTRAMUSCULAR; INTRAVENOUS
Status: DISPENSED
Start: 2017-11-10

## (undated) RX ORDER — PROPOFOL 10 MG/ML
INJECTION, EMULSION INTRAVENOUS
Status: DISPENSED
Start: 2022-10-12

## (undated) RX ORDER — GLYCOPYRROLATE 0.2 MG/ML
INJECTION, SOLUTION INTRAMUSCULAR; INTRAVENOUS
Status: DISPENSED
Start: 2022-10-12